# Patient Record
Sex: FEMALE | Race: WHITE | NOT HISPANIC OR LATINO | Employment: PART TIME | URBAN - METROPOLITAN AREA
[De-identification: names, ages, dates, MRNs, and addresses within clinical notes are randomized per-mention and may not be internally consistent; named-entity substitution may affect disease eponyms.]

---

## 2018-01-10 NOTE — RESULT NOTES
Verified Results  * XR SPINE LUMBAR MINIMUM 4 VIEWS NON INJURY [id number] 05:05PM Gia Navarro Order Number: [id number]    Order Number: [id number]     Test Name Result Flag Reference   XR SPINE LUMBAR MINIMUM 4 VIEWS (Report)     LUMBAR SPINE     INDICATION: Lower back pain  COMPARISON: None     VIEWS: AP, lateral, bilateral oblique and coned down projections; 5 images     FINDINGS:     Alignment is unremarkable  There is no radiographic evidence of acute fracture or destructive osseous lesion  Mild degenerative changes are noted the posterior elements of the mid and lower lumbar spine  Visualized soft tissues appear unremarkable  IMPRESSION:     Mild degenerative changes, mid and lower lumbar spine  Workstation performed: [id number]     Signed by:   Tracey Yan MD   9/9/16     * XR HIP/PELV 2-3 VWS LEFT W PELVIS IF PERFORMED [id number] 05:05PM Gia Navarro Order Number: [id number]    Order Number: [id number]    Order Number: [id number]     Test Name Result Flag Reference   * XR HIP/PELV 2-3 VWS LEFT (Report)     LEFT HIP     INDICATION: Left hip pain  COMPARISON: None     VIEWS: AP pelvis and 2 coned down views; 3 images     FINDINGS:     Posttraumatic or postsurgical changes are noted along the superolateral left iliac bone  Mild narrowing of the left hip joint space  No lytic or blastic lesions are seen  Tubal ligation appliances are seen in the true pelvis  IMPRESSION:     Mild degenerative changes, left hip  Workstation performed: [id number]     Signed by:   Tracey Yan MD   9/9/16

## 2018-01-15 NOTE — RESULT NOTES
Verified Results  (1) COMPREHENSIVE METABOLIC PANEL [id number] 06:68MA Patric Richardson     Test Name Result Flag Reference   GLUCOSE 106 mg/dL H 65-99   Fasting reference interval   UREA NITROGEN (BUN) 18 mg/dL  7-25   CREATININE 0 92 mg/dL  0 50-1 05   For patients >52years of age, the reference limit  for Creatinine is approximately 13% higher for people  identified as -American  eGFR NON-AFR   AMERICAN 70 mL/min/1 73m2  > OR = 60   eGFR AFRICAN AMERICAN 81 mL/min/1 73m2  > OR = 60   BUN/CREATININE RATIO   5-33   NOT APPLICABLE (calc)   SODIUM 137 mmol/L  135-146   POTASSIUM 4 5 mmol/L  3 5-5 3   CHLORIDE 105 mmol/L     CARBON DIOXIDE 25 mmol/L  20-31   CALCIUM 9 6 mg/dL  8 6-10 4   PROTEIN, TOTAL 6 8 g/dL  6 1-8 1   ALBUMIN 4 5 g/dL  3 6-5 1   GLOBULIN 2 3 g/dL (calc)  1 9-3 7   ALBUMIN/GLOBULIN RATIO 2 0 (calc)  1 0-2 5   BILIRUBIN, TOTAL 0 5 mg/dL  0 2-1 2   ALKALINE PHOSPHATASE 85 U/L     AST 14 U/L  10-35   ALT 13 U/L  6-29

## 2018-01-16 NOTE — RESULT NOTES
Verified Results  (1) ANTITHYROGLOBULIN ANTIBODY [id number] 11:06AM Araceli Morley Order Number: [id number]   Order Number: [id number]     Test Name Result Flag Reference   THYROGLOB AB <1 0 IU/mL  0 0 - 0 9   Thyroglobulin Antibody measured by [facility] Methodology    Performed at:  [address]  [id number]  : Erik Soto MD, Phone:  [phone] [address] [facility] THYROID [id number] 10:59AM Araceli Morley Order Number: [id number]    - Patient Instructions: To schedule this appointment, please contact Central Scheduling at [phone]   Order Number: [id number]    - Patient Instructions: To schedule this appointment, please contact Central Scheduling at [phone]   Order Number: [id number]    - Patient Instructions: To schedule this appointment, please contact Central Scheduling at [phone]  Test Name Result Flag Reference   US THYROID (Report)     THYROID ULTRASOUND     INDICATION: Palpable nodule     COMPARISON: None  TECHNIQUE:  Ultrasound of the thyroid was performed with a high frequency linear transducer in transverse and sagittal planes including volumetric imaging sweeps as well as traditional still imaging technique  FINDINGS:   Normal homogeneous smooth echotexture  Right gland: 5 0 x 1 5 x 1 8 cm  No dominant nodules  2 very small hypoechoic nodules, too small to characterize, 4 x 2 mm in the mid upper anterior gland 3 x 3 mm in the mid posterior gland, of doubtful clinical significance  Do not warrant biopsy at this time  No finding to account for clinical history  Left gland: 4 3 x 1 3 x 1 6 cm  No dominant nodules  Isthmus: 0 3 cm in AP dimension  No dominant nodules  IMPRESSION:      No finding to account for history  No nodules which meet criteria for biopsy         Workstation performed: [id number]     Signed by:   Tiana García MD   11/4/16

## 2018-01-31 ENCOUNTER — TELEPHONE (OUTPATIENT)
Dept: FAMILY MEDICINE CLINIC | Facility: CLINIC | Age: 58
End: 2018-01-31

## 2018-01-31 ENCOUNTER — OFFICE VISIT (OUTPATIENT)
Dept: FAMILY MEDICINE CLINIC | Facility: CLINIC | Age: 58
End: 2018-01-31
Payer: COMMERCIAL

## 2018-01-31 VITALS
SYSTOLIC BLOOD PRESSURE: 110 MMHG | DIASTOLIC BLOOD PRESSURE: 76 MMHG | HEIGHT: 68 IN | HEART RATE: 68 BPM | TEMPERATURE: 96.9 F | BODY MASS INDEX: 25.28 KG/M2 | WEIGHT: 166.8 LBS

## 2018-01-31 DIAGNOSIS — J01.01 ACUTE RECURRENT MAXILLARY SINUSITIS: Primary | ICD-10-CM

## 2018-01-31 PROCEDURE — 3008F BODY MASS INDEX DOCD: CPT | Performed by: NURSE PRACTITIONER

## 2018-01-31 PROCEDURE — 99213 OFFICE O/P EST LOW 20 MIN: CPT | Performed by: NURSE PRACTITIONER

## 2018-01-31 RX ORDER — DEXTROMETHORPHAN HYDROBROMIDE AND PROMETHAZINE HYDROCHLORIDE 15; 6.25 MG/5ML; MG/5ML
5 SYRUP ORAL EVERY 6 HOURS PRN
Qty: 118 ML | Refills: 0 | Status: SHIPPED | OUTPATIENT
Start: 2018-01-31 | End: 2019-05-13 | Stop reason: ALTCHOICE

## 2018-01-31 RX ORDER — CEPHALEXIN 500 MG/1
500 CAPSULE ORAL 3 TIMES DAILY
Qty: 30 CAPSULE | Refills: 0 | Status: SHIPPED | OUTPATIENT
Start: 2018-01-31 | End: 2018-02-10

## 2018-01-31 NOTE — PROGRESS NOTES
Patient ID: Domi De Los Santos is a 65 y o  female  HPI: 65 y  o female presenting with sinus for past 1 5 weeks   She reports feeling tired and achy with sinus pressure, andnasal congestion  Patient reports that her upper teeth hurt and a lot pressure across bridge of her nose  She as been using Afrin nasal spray to sleep at night and OTC Theraflu to treat decongest with only slight relief of symptoms  Patient denies fever or chills being associated with above mentioned symptoms  SUBJECTIVE    No family history on file  Social History     Social History    Marital status: /Civil Union     Spouse name: N/A    Number of children: N/A    Years of education: N/A     Occupational History    Not on file  Social History Main Topics    Smoking status: Not on file    Smokeless tobacco: Not on file    Alcohol use Not on file    Drug use: Unknown    Sexual activity: Not on file     Other Topics Concern    Not on file     Social History Narrative    No narrative on file     No past medical history on file  No past surgical history on file  Allergies no known allergies  No current outpatient prescriptions on file      Review of Systems  Constitutional:     Denies fever, chills ,weakness ,weight loss, weight gain   Positive fatigue  ENT: Denies earache ,loss of hearing ,nosebleed, sore throat ,hoarseness   Positive nasal discharge and nasal congestion ,  Pulmonary: Denies shortness of breath ,cough  ,dyspnea on exertion, orthopnea  ,PND   Cardiovascular:  Denies bradycardia , tachycardia  ,palpations, lower extremity edema leg, claudication  Abdomen:  Denies abdominal pain , anorexia , indigestion, nausea, vomiting, constipation, diarrhea  Musculoskeletal: Denies myalgias, arthralgias, joint swelling, joint stiffness , limb pain, limb swelling  Skin: Denies skin rash, skin lesion, skin wound, itching, dry skin  Neuro: Denies headache, numbness, tingling, confusion, loss of consciousness, dizziness, vertigo  Psychiatric: Denies feelings of depression, anxiety, sleep disturbances    OBJECTIVE    Constitutional:   NAD, well appearing and well nourished      ENT:   Conjunctiva and lids: no injection, edema, or discharge     Pupils and iris: BABAR bilaterally    External inspection of ears and nose: normal without deformities or discharge  Otoscopic exam: Right canal patent without erythema  Left canal erythematous with TM dull  Nasal mucosa, septum and turbinates: Positive bilateral turbinate erythema with thick nasal mucous  Oropharynx:  Moist mucosa, Positive posterior pharynx erythema with visible post nasal drip  Pulmonary:Respiratory effort normal rate and rhythm, no increased work of breathing   Auscultation of lungs:  Clear bilaterally with no adventitious breath sounds       Cardiovascular: regular rate and rhythm, S1 and S2, no murmur, no edema and/or varicosities of LE      Abdomen: Soft and non-distended     Lymphatic:  No anterior or posterior cervical lymphadenopathy    Musculoskeletal:  Gait and station: Normal gait      Digits and nails normal without clubbing or cyanosis       Skin: Normal skin turgor and no rashes      Neuro:    Normal reflexes    Psych:   alert and oriented to person, place and time     normal mood and affect       Assessment/Plan:  Maxillary sinusitis, recurrent  Reviewed with patient plan to treat with 10 day course of Cephalexin and Promethazine DM every 6 hours as needed  Patient instructed to call in 72 hours if not feeling better or if symptoms worsen

## 2018-07-17 ENCOUNTER — TELEPHONE (OUTPATIENT)
Dept: BEHAVIORAL/MENTAL HEALTH CLINIC | Facility: CLINIC | Age: 58
End: 2018-07-17

## 2018-07-17 NOTE — TELEPHONE ENCOUNTER
----- Message from Mi Naida sent at 7/17/2018 12:30 PM EDT -----  Contact: [phone]  Has shared custody of 15year old grandson who lost his father  Tiffany Padilla has anger and behavioral issues  Grandson's mother problematic  Luciana looking for direction on dealing with grandson and may need to bring her  in as well   Yomaira Centeno

## 2019-02-10 ENCOUNTER — HOSPITAL ENCOUNTER (EMERGENCY)
Facility: HOSPITAL | Age: 59
Discharge: HOME | End: 2019-02-10
Attending: EMERGENCY MEDICINE | Admitting: EMERGENCY MEDICINE
Payer: COMMERCIAL

## 2019-02-10 VITALS
OXYGEN SATURATION: 97 % | HEART RATE: 76 BPM | TEMPERATURE: 97.9 F | WEIGHT: 158.73 LBS | BODY MASS INDEX: 24.31 KG/M2 | RESPIRATION RATE: 18 BRPM | DIASTOLIC BLOOD PRESSURE: 62 MMHG | SYSTOLIC BLOOD PRESSURE: 108 MMHG

## 2019-02-10 DIAGNOSIS — J40 BRONCHITIS: ICD-10-CM

## 2019-02-10 DIAGNOSIS — J45.909 BRONCHIAL ASTHMA: Primary | ICD-10-CM

## 2019-02-10 PROCEDURE — 99283 EMERGENCY DEPT VISIT LOW MDM: CPT

## 2019-02-10 PROCEDURE — 94640 AIRWAY INHALATION TREATMENT: CPT

## 2019-02-10 RX ORDER — ALBUTEROL SULFATE 2.5 MG/3ML
5 SOLUTION RESPIRATORY (INHALATION) ONCE
Status: COMPLETED | OUTPATIENT
Start: 2019-02-10 | End: 2019-02-10

## 2019-02-10 RX ORDER — ALBUTEROL SULFATE 90 UG/1
1-2 AEROSOL, METERED RESPIRATORY (INHALATION) EVERY 6 HOURS PRN
Qty: 1 INHALER | Refills: 0 | Status: SHIPPED | OUTPATIENT
Start: 2019-02-10 | End: 2019-05-13 | Stop reason: ALTCHOICE

## 2019-02-10 RX ADMIN — ALBUTEROL SULFATE 5 MG: 2.5 SOLUTION RESPIRATORY (INHALATION) at 12:03

## 2019-02-10 NOTE — ED NOTES
Pt appeared to be in no acute distress upon discharge  Verbal understanding obtained from pt on d/c instructions as well as Rx and follow up care  Pt able to ambulate well without assistance upon exiting       Corinne Obey, RN  02/10/19 4103

## 2019-02-10 NOTE — ED PROVIDER NOTES
History  Chief Complaint   Patient presents with    Asthma     pt informed went to urgent care yesterday dx with bronchitis and asthma  given PO abx and inhaler with cough medication and steroid all started yesterday at 1pm      Patient presents to the emergency department for evaluation of breathing difficulty  She was seen at a local urgent care and diagnosed with asthmatic bronchitis yesterday and placed on antibiotics and steroids  This morning she was having breathing difficulty that has improved since  She denies chest pain fever chills  She denies back or belly pain  She denies jaw arm or neck pain  She denies nausea vomiting diarrhea  She does not have a history of asthma  She has been eating and drinking normally  Prior to Admission Medications   Prescriptions Last Dose Informant Patient Reported? Taking?   promethazine-dextromethorphan (PHENERGAN-DM) 6 25-15 mg/5 mL oral syrup   No Yes   Sig: Take 5 mL by mouth every 6 (six) hours as needed for cough      Facility-Administered Medications: None       Past Medical History:   Diagnosis Date    Congenital hip dysplasia     Seasonal allergies        Past Surgical History:   Procedure Laterality Date    HIP SURGERY      OVARIAN CYST REMOVAL      TUBAL LIGATION         Family History   Problem Relation Age of Onset    Cancer Father         Urinary Bladder     I have reviewed and agree with the history as documented  Social History     Tobacco Use    Smoking status: Former Smoker    Smokeless tobacco: Never Used   Substance Use Topics    Alcohol use: Yes     Comment: rare    Drug use: Never        Review of Systems   Constitutional: Negative  Negative for activity change, appetite change, chills, diaphoresis, fatigue and fever  HENT: Negative  Negative for congestion, drooling, ear discharge, ear pain, rhinorrhea, sinus pressure, sinus pain, sore throat and trouble swallowing  Eyes: Negative    Negative for photophobia and visual disturbance  Respiratory: Positive for chest tightness, shortness of breath and wheezing  Negative for cough, choking and stridor  Cardiovascular: Negative  Negative for chest pain, palpitations and leg swelling  Gastrointestinal: Negative for abdominal pain, diarrhea, nausea and vomiting  Endocrine: Negative  Genitourinary: Negative  Negative for dysuria, frequency, hematuria and urgency  Musculoskeletal: Negative  Negative for back pain, neck pain and neck stiffness  Skin: Negative  Negative for rash  Allergic/Immunologic: Negative  Neurological: Negative  Negative for dizziness, tremors, seizures, syncope, speech difficulty, weakness, numbness and headaches  Hematological: Negative  Does not bruise/bleed easily  Psychiatric/Behavioral: Negative  Negative for confusion  Physical Exam  Physical Exam   Constitutional: She is oriented to person, place, and time  She appears well-developed and well-nourished  No distress  Nontoxic appearance  No obvious respiratory distress  Patient can speak in full sentences  HENT:   Head: Normocephalic and atraumatic  Eyes: Pupils are equal, round, and reactive to light  Conjunctivae and EOM are normal    Neck: Normal range of motion  Neck supple  Cardiovascular: Normal rate, regular rhythm, normal heart sounds and intact distal pulses  Pulmonary/Chest: Effort normal and breath sounds normal  No stridor  No respiratory distress  She has no wheezes  She has no rales  She exhibits no tenderness  Abdominal: Soft  Bowel sounds are normal  She exhibits no distension and no mass  There is no tenderness  There is no rebound and no guarding  No hernia  Musculoskeletal: Normal range of motion  She exhibits no edema, tenderness or deformity  Neurological: She is alert and oriented to person, place, and time  She has normal reflexes  She displays normal reflexes  No cranial nerve deficit or sensory deficit   She exhibits normal muscle tone  Coordination normal    Skin: Skin is warm and dry  No rash noted  She is not diaphoretic  No erythema  No pallor  Psychiatric: She has a normal mood and affect  Her behavior is normal  Judgment and thought content normal    Nursing note and vitals reviewed  Vital Signs  ED Triage Vitals [02/10/19 1119]   Temperature Pulse Respirations Blood Pressure SpO2   97 9 °F (36 6 °C) 76 18 108/62 97 %      Temp Source Heart Rate Source Patient Position - Orthostatic VS BP Location FiO2 (%)   Oral Monitor Sitting Right arm --      Pain Score       No Pain           Vitals:    02/10/19 1119   BP: 108/62   Pulse: 76   Patient Position - Orthostatic VS: Sitting       Visual Acuity      ED Medications  Medications   albuterol inhalation solution 5 mg (5 mg Nebulization Given 2/10/19 1203)       Diagnostic Studies  Results Reviewed     None                 No orders to display              Procedures  Procedures       Phone Contacts  ED Phone Contact    ED Course  ED Course as of Feb 10 1228   Sun Feb 10, 2019   1225 Patient is stable for discharge  Feels significantly improved after an albuterol treatment  She will continue all of her medications and I will discharge her with under inhaler  I discussed signs and symptoms that would require return to the emergency department  MDM    Disposition  Final diagnoses:   Bronchial asthma   Bronchitis     Time reflects when diagnosis was documented in both MDM as applicable and the Disposition within this note     Time User Action Codes Description Comment    2/10/2019 12:26 PM Sary Silence Add [V69 531] Bronchial asthma     2/10/2019 12:26 PM Sary Silence Add [J40] Bronchitis       ED Disposition     ED Disposition Condition Date/Time Comment    Discharge Good Sun Feb 10, 2019 12:27 PM Aaron Marcelino discharge to home/self care              Follow-up Information     Follow up With Specialties Details Why Contact Info Ina Goldstein, DO Family Medicine  As needed [address]  [address]  [phone]            Patient's Medications   Discharge Prescriptions    ALBUTEROL (PROVENTIL HFA,VENTOLIN HFA) 90 MCG/ACT INHALER    Inhale 1-2 puffs every 6 (six) hours as needed for wheezing       Start Date: 2/10/2019 End Date: --       Order Dose: 1-2 puffs       Quantity: 1 Inhaler    Refills: 0     No discharge procedures on file      ED Provider  Electronically Signed by           Adam Goodwin MD  02/10/19 9582

## 2019-03-21 ENCOUNTER — TELEPHONE (OUTPATIENT)
Dept: FAMILY MEDICINE CLINIC | Facility: CLINIC | Age: 59
End: 2019-03-21

## 2019-05-13 ENCOUNTER — TELEPHONE (OUTPATIENT)
Dept: FAMILY MEDICINE CLINIC | Facility: CLINIC | Age: 59
End: 2019-05-13

## 2019-05-13 ENCOUNTER — OFFICE VISIT (OUTPATIENT)
Dept: FAMILY MEDICINE CLINIC | Facility: CLINIC | Age: 59
End: 2019-05-13
Payer: COMMERCIAL

## 2019-05-13 VITALS
DIASTOLIC BLOOD PRESSURE: 62 MMHG | TEMPERATURE: 98.7 F | WEIGHT: 161.8 LBS | SYSTOLIC BLOOD PRESSURE: 108 MMHG | HEART RATE: 76 BPM | BODY MASS INDEX: 25.39 KG/M2 | HEIGHT: 67 IN

## 2019-05-13 DIAGNOSIS — M54.50 LUMBAR BACK PAIN: Primary | ICD-10-CM

## 2019-05-13 PROCEDURE — 3008F BODY MASS INDEX DOCD: CPT | Performed by: FAMILY MEDICINE

## 2019-05-13 PROCEDURE — 1036F TOBACCO NON-USER: CPT | Performed by: FAMILY MEDICINE

## 2019-05-13 PROCEDURE — 99213 OFFICE O/P EST LOW 20 MIN: CPT | Performed by: FAMILY MEDICINE

## 2019-05-13 RX ORDER — HYDROCODONE BITARTRATE AND ACETAMINOPHEN 5; 325 MG/1; MG/1
1 TABLET ORAL 3 TIMES DAILY PRN
Qty: 30 TABLET | Refills: 0 | Status: SHIPPED | OUTPATIENT
Start: 2019-05-13 | End: 2019-05-23

## 2019-05-13 RX ORDER — METHOCARBAMOL 500 MG/1
500 TABLET, FILM COATED ORAL 3 TIMES DAILY
Qty: 30 TABLET | Refills: 0 | Status: SHIPPED | OUTPATIENT
Start: 2019-05-13 | End: 2022-02-21 | Stop reason: ALTCHOICE

## 2019-05-13 RX ORDER — PREDNISONE 10 MG/1
TABLET ORAL
Qty: 26 TABLET | Refills: 0 | Status: SHIPPED | OUTPATIENT
Start: 2019-05-13 | End: 2022-02-21 | Stop reason: ALTCHOICE

## 2019-08-02 ENCOUNTER — TELEPHONE (OUTPATIENT)
Dept: FAMILY MEDICINE CLINIC | Facility: CLINIC | Age: 59
End: 2019-08-02

## 2019-08-02 DIAGNOSIS — Z12.39 SCREENING FOR BREAST CANCER: Primary | ICD-10-CM

## 2019-08-02 DIAGNOSIS — E78.00 HYPERCHOLESTEROLEMIA: ICD-10-CM

## 2019-08-02 DIAGNOSIS — Z12.11 COLON CANCER SCREENING: Primary | ICD-10-CM

## 2019-08-02 DIAGNOSIS — R53.83 OTHER FATIGUE: ICD-10-CM

## 2019-08-02 NOTE — TELEPHONE ENCOUNTER
Patient called asking if a routine blood work slip could be written? Also she is asking if a cologuard slip could be written?

## 2020-11-24 ENCOUNTER — TELEPHONE (OUTPATIENT)
Dept: FAMILY MEDICINE CLINIC | Facility: CLINIC | Age: 60
End: 2020-11-24

## 2020-11-24 ENCOUNTER — CLINICAL SUPPORT (OUTPATIENT)
Dept: FAMILY MEDICINE CLINIC | Facility: CLINIC | Age: 60
End: 2020-11-24

## 2020-11-24 DIAGNOSIS — Z20.822 SUSPECTED COVID-19 VIRUS INFECTION: Primary | ICD-10-CM

## 2020-11-24 PROCEDURE — RECHECK: Performed by: FAMILY MEDICINE

## 2020-11-24 PROCEDURE — U0003 INFECTIOUS AGENT DETECTION BY NUCLEIC ACID (DNA OR RNA); SEVERE ACUTE RESPIRATORY SYNDROME CORONAVIRUS 2 (SARS-COV-2) (CORONAVIRUS DISEASE [COVID-19]), AMPLIFIED PROBE TECHNIQUE, MAKING USE OF HIGH THROUGHPUT TECHNOLOGIES AS DESCRIBED BY CMS-2020-01-R: HCPCS | Performed by: FAMILY MEDICINE

## 2020-11-25 LAB — SARS-COV-2 RNA SPEC QL NAA+PROBE: NOT DETECTED

## 2020-12-08 ENCOUNTER — TELEMEDICINE (OUTPATIENT)
Dept: FAMILY MEDICINE CLINIC | Facility: CLINIC | Age: 60
End: 2020-12-08
Payer: COMMERCIAL

## 2020-12-08 ENCOUNTER — TELEPHONE (OUTPATIENT)
Dept: FAMILY MEDICINE CLINIC | Facility: CLINIC | Age: 60
End: 2020-12-08

## 2020-12-08 DIAGNOSIS — J01.00 ACUTE NON-RECURRENT MAXILLARY SINUSITIS: Primary | ICD-10-CM

## 2020-12-08 PROCEDURE — 99213 OFFICE O/P EST LOW 20 MIN: CPT | Performed by: FAMILY MEDICINE

## 2020-12-08 RX ORDER — AMOXICILLIN AND CLAVULANATE POTASSIUM 875; 125 MG/1; MG/1
1 TABLET, FILM COATED ORAL EVERY 12 HOURS SCHEDULED
Qty: 14 TABLET | Refills: 0 | Status: SHIPPED | OUTPATIENT
Start: 2020-12-08 | End: 2020-12-15

## 2021-01-26 ENCOUNTER — VBI (OUTPATIENT)
Dept: ADMINISTRATIVE | Facility: OTHER | Age: 61
End: 2021-01-26

## 2021-02-09 ENCOUNTER — VBI (OUTPATIENT)
Dept: ADMINISTRATIVE | Facility: OTHER | Age: 61
End: 2021-02-09

## 2021-03-18 ENCOUNTER — APPOINTMENT (OUTPATIENT)
Dept: LAB | Facility: CLINIC | Age: 61
End: 2021-03-18
Payer: COMMERCIAL

## 2021-03-18 ENCOUNTER — OFFICE VISIT (OUTPATIENT)
Dept: FAMILY MEDICINE CLINIC | Facility: CLINIC | Age: 61
End: 2021-03-18
Payer: COMMERCIAL

## 2021-03-18 ENCOUNTER — TRANSCRIBE ORDERS (OUTPATIENT)
Dept: LAB | Facility: CLINIC | Age: 61
End: 2021-03-18

## 2021-03-18 VITALS
OXYGEN SATURATION: 98 % | DIASTOLIC BLOOD PRESSURE: 82 MMHG | BODY MASS INDEX: 26.83 KG/M2 | HEART RATE: 77 BPM | HEIGHT: 68 IN | SYSTOLIC BLOOD PRESSURE: 122 MMHG | WEIGHT: 177 LBS | TEMPERATURE: 98.1 F

## 2021-03-18 DIAGNOSIS — R53.83 OTHER FATIGUE: ICD-10-CM

## 2021-03-18 DIAGNOSIS — Z00.00 ANNUAL PHYSICAL EXAM: Primary | ICD-10-CM

## 2021-03-18 DIAGNOSIS — J01.90 ACUTE SINUSITIS, RECURRENCE NOT SPECIFIED, UNSPECIFIED LOCATION: ICD-10-CM

## 2021-03-18 DIAGNOSIS — E78.00 HYPERCHOLESTEROLEMIA: ICD-10-CM

## 2021-03-18 DIAGNOSIS — J30.9 ALLERGIC RHINITIS, UNSPECIFIED SEASONALITY, UNSPECIFIED TRIGGER: ICD-10-CM

## 2021-03-18 DIAGNOSIS — E04.9 GOITER: ICD-10-CM

## 2021-03-18 DIAGNOSIS — Z12.31 ENCOUNTER FOR SCREENING MAMMOGRAM FOR MALIGNANT NEOPLASM OF BREAST: ICD-10-CM

## 2021-03-18 LAB
ALBUMIN SERPL BCP-MCNC: 4 G/DL (ref 3.5–5)
ALP SERPL-CCNC: 111 U/L (ref 46–116)
ALT SERPL W P-5'-P-CCNC: 30 U/L (ref 12–78)
ANION GAP SERPL CALCULATED.3IONS-SCNC: 2 MMOL/L (ref 4–13)
AST SERPL W P-5'-P-CCNC: 19 U/L (ref 5–45)
BILIRUB SERPL-MCNC: 0.9 MG/DL (ref 0.2–1)
BUN SERPL-MCNC: 17 MG/DL (ref 5–25)
CALCIUM SERPL-MCNC: 9.6 MG/DL (ref 8.3–10.1)
CHLORIDE SERPL-SCNC: 106 MMOL/L (ref 100–108)
CHOLEST SERPL-MCNC: 288 MG/DL (ref 50–200)
CO2 SERPL-SCNC: 29 MMOL/L (ref 21–32)
CREAT SERPL-MCNC: 1 MG/DL (ref 0.6–1.3)
GFR SERPL CREATININE-BSD FRML MDRD: 62 ML/MIN/1.73SQ M
GLUCOSE P FAST SERPL-MCNC: 97 MG/DL (ref 65–99)
HDLC SERPL-MCNC: 44 MG/DL
LDLC SERPL CALC-MCNC: 190 MG/DL (ref 0–100)
POTASSIUM SERPL-SCNC: 4.3 MMOL/L (ref 3.5–5.3)
PROT SERPL-MCNC: 7.4 G/DL (ref 6.4–8.2)
SODIUM SERPL-SCNC: 137 MMOL/L (ref 136–145)
TRIGL SERPL-MCNC: 272 MG/DL
TSH SERPL DL<=0.05 MIU/L-ACNC: 1.76 UIU/ML (ref 0.36–3.74)

## 2021-03-18 PROCEDURE — 1036F TOBACCO NON-USER: CPT | Performed by: FAMILY MEDICINE

## 2021-03-18 PROCEDURE — 3725F SCREEN DEPRESSION PERFORMED: CPT | Performed by: FAMILY MEDICINE

## 2021-03-18 PROCEDURE — 80053 COMPREHEN METABOLIC PANEL: CPT

## 2021-03-18 PROCEDURE — 80061 LIPID PANEL: CPT

## 2021-03-18 PROCEDURE — 99214 OFFICE O/P EST MOD 30 MIN: CPT | Performed by: FAMILY MEDICINE

## 2021-03-18 PROCEDURE — 36415 COLL VENOUS BLD VENIPUNCTURE: CPT

## 2021-03-18 PROCEDURE — 84443 ASSAY THYROID STIM HORMONE: CPT

## 2021-03-18 PROCEDURE — 99396 PREV VISIT EST AGE 40-64: CPT | Performed by: FAMILY MEDICINE

## 2021-03-18 RX ORDER — MONTELUKAST SODIUM 10 MG/1
10 TABLET ORAL
Qty: 30 TABLET | Refills: 5 | Status: SHIPPED | OUTPATIENT
Start: 2021-03-18 | End: 2021-03-25 | Stop reason: SDUPTHER

## 2021-03-18 RX ORDER — AZELASTINE 1 MG/ML
2 SPRAY, METERED NASAL 2 TIMES DAILY
Qty: 1 BOTTLE | Refills: 1 | Status: SHIPPED | OUTPATIENT
Start: 2021-03-18 | End: 2021-04-09

## 2021-03-18 RX ORDER — AZITHROMYCIN 250 MG/1
TABLET, FILM COATED ORAL
Qty: 6 TABLET | Refills: 0 | Status: SHIPPED | OUTPATIENT
Start: 2021-03-18 | End: 2021-03-22

## 2021-03-18 NOTE — PROGRESS NOTES
ADULT ANNUAL [address]    NAME: Huong Hidalgo  AGE: 62 y o  SEX: female  : 1960     DATE: 3/18/2021     Assessment and Plan:     Problem List Items Addressed This Visit     None      Visit Diagnoses     Encounter for screening mammogram for malignant neoplasm of breast    -  Primary    Relevant Orders    Mammo screening bilateral w 3d & cad          Immunizations and preventive care screenings were discussed with patient today  Appropriate education was printed on patient's after visit summary  Counseling:  · Exercise: the importance of regular exercise/physical activity was discussed  Recommend exercise 3-5 times per week for at least 30 minutes  No follow-ups on file  Chief Complaint:     Chief Complaint   Patient presents with    Annual Exam      History of Present Illness:     Adult Annual Physical   Patient here for a comprehensive physical exam  The patient reports no problems  Diet and Physical Activity  · Diet/Nutrition: well balanced diet  · Exercise: no formal exercise  Depression Screening  PHQ-9 Depression Screening    PHQ-9:   Frequency of the following problems over the past two weeks:      Little interest or pleasure in doing things: 0 - not at all  Feeling down, depressed, or hopeless: 0 - not at all  PHQ-2 Score: 0       General Health  · Sleep: sleeps well  · Hearing: normal - bilateral   · Vision: no vision problems  · Dental: regular dental visits  /GYN Health  · Patient is: postmenopausal  · Last menstrual period:   · Contraceptive method:       Review of Systems:     Review of Systems   Constitutional: Negative for appetite change, chills and fever  HENT: Negative for ear pain, facial swelling, rhinorrhea, sinus pain, sore throat and trouble swallowing  Eyes: Negative for discharge and redness     Respiratory: Negative for chest tightness, shortness of breath and wheezing  Cardiovascular: Negative for chest pain and palpitations  Gastrointestinal: Negative for abdominal pain, diarrhea, nausea and vomiting  Endocrine: Negative for polyuria  Genitourinary: Negative for dysuria and urgency  Musculoskeletal: Negative for arthralgias and back pain  Skin: Negative for rash  Neurological: Negative for dizziness, weakness and headaches  Hematological: Negative for adenopathy  Psychiatric/Behavioral: Negative for behavioral problems, confusion and sleep disturbance  All other systems reviewed and are negative       Past Medical History:     Past Medical History:   Diagnosis Date    Congenital hip dysplasia     Seasonal allergies       Past Surgical History:     Past Surgical History:   Procedure Laterality Date    HIP SURGERY      OVARIAN CYST REMOVAL      TUBAL LIGATION        Social History:        Social History     Socioeconomic History    Marital status: /Civil Union     Spouse name: None    Number of children: None    Years of education: None    Highest education level: None   Occupational History    None   Social Needs    Financial resource strain: None    Food insecurity     Worry: None     Inability: None    Transportation needs     Medical: None     Non-medical: None   Tobacco Use    Smoking status: Former Smoker    Smokeless tobacco: Never Used   Substance and Sexual Activity    Alcohol use: Yes     Comment: rare    Drug use: Never    Sexual activity: None   Lifestyle    Physical activity     Days per week: None     Minutes per session: None    Stress: None   Relationships    Social connections     Talks on phone: None     Gets together: None     Attends Zoroastrian service: None     Active member of club or organization: None     Attends meetings of clubs or organizations: None     Relationship status: None    Intimate partner violence     Fear of current or ex partner: None     Emotionally abused: None     Physically abused: None     Forced sexual activity: None   Other Topics Concern    None   Social History Narrative    Coffee      Family History:     Family History   Problem Relation Age of Onset    Cancer Father         Urinary Bladder      Current Medications:     Current Outpatient Medications   Medication Sig Dispense Refill    methocarbamol (ROBAXIN) 500 mg tablet Take 1 tablet (500 mg total) by mouth 3 (three) times a day 30 tablet 0    predniSONE 10 mg tablet 3 tabs po bid x2 days, then 2 tabs po bid x2 days, then 1 tab bid x2 days, then 1 daily until done  26 tablet 0     No current facility-administered medications for this visit  Allergies:     No Known Allergies   Physical Exam:     /82   Pulse 77   Temp 98 1 °F (36 7 °C)   Ht 5' 7 5" (1 715 m)   Wt 80 3 kg (177 lb)   SpO2 98%   BMI 27 31 kg/m²     Physical Exam  Vitals signs and nursing note reviewed  Constitutional:       General: She is not in acute distress  Appearance: Normal appearance  She is not ill-appearing or diaphoretic  HENT:      Head: Normocephalic and atraumatic  Right Ear: Tympanic membrane, ear canal and external ear normal       Left Ear: Tympanic membrane, ear canal and external ear normal       Nose: Nose normal  No congestion or rhinorrhea  Mouth/Throat:      Mouth: Mucous membranes are moist       Pharynx: Oropharynx is clear  No posterior oropharyngeal erythema  Eyes:      General:         Right eye: No discharge  Left eye: No discharge  Extraocular Movements: Extraocular movements intact  Conjunctiva/sclera: Conjunctivae normal       Pupils: Pupils are equal, round, and reactive to light  Neck:      Musculoskeletal: Normal range of motion and neck supple  No neck rigidity  Vascular: No carotid bruit  Cardiovascular:      Rate and Rhythm: Normal rate and regular rhythm  Pulses: Normal pulses  Heart sounds: Normal heart sounds  No murmur     Pulmonary:      Effort: Pulmonary effort is normal  No respiratory distress  Breath sounds: Normal breath sounds  No wheezing or rhonchi  Abdominal:      General: Abdomen is flat  Bowel sounds are normal  There is no distension  Palpations: There is no mass  Tenderness: There is no abdominal tenderness  Musculoskeletal: Normal range of motion  General: No swelling or deformity  Right lower leg: No edema  Left lower leg: No edema  Lymphadenopathy:      Cervical: No cervical adenopathy  Skin:     General: Skin is warm and dry  Capillary Refill: Capillary refill takes less than 2 seconds  Coloration: Skin is not jaundiced  Findings: No bruising, erythema or rash  Neurological:      General: No focal deficit present  Mental Status: She is alert and oriented to person, place, and time  Cranial Nerves: No cranial nerve deficit  Sensory: No sensory deficit  Gait: Gait normal       Deep Tendon Reflexes: Reflexes normal    Psychiatric:         Mood and Affect: Mood normal          Behavior: Behavior normal          Thought Content: Thought content normal          Judgment: Judgment normal           BMI Counseling: Body mass index is 27 31 kg/m²  The BMI is above normal  Nutrition recommendations include reducing portion sizes       Allie Kiran, [address]

## 2021-03-18 NOTE — PATIENT INSTRUCTIONS

## 2021-03-19 DIAGNOSIS — E78.00 HYPERCHOLESTEROLEMIA: Primary | ICD-10-CM

## 2021-03-25 RX ORDER — MONTELUKAST SODIUM 10 MG/1
10 TABLET ORAL
Qty: 30 TABLET | Refills: 5
Start: 2021-03-25 | End: 2022-02-21 | Stop reason: ALTCHOICE

## 2021-03-25 NOTE — PROGRESS NOTES
Patient ID: Gary Gonzalez is a 62 y o  female  HPI: 62 y  o female presenting with symptoms of sinus pain,pressure, nasal congestion, pnd dry cough , ear and throat pain  She is also is looking for a med to help with her allergies  She takes an otc claritin daily but still has daily symtpoms of pnd, crackling in her ears, fatigue  and pnd  In addition,she is due for a cholesterol as well as thryoid level check      SUBJECTIVE    Family History   Problem Relation Age of Onset    Cancer Father         Urinary Bladder     Social History     Socioeconomic History    Marital status: /Civil Union     Spouse name: Not on file    Number of children: Not on file    Years of education: Not on file    Highest education level: Not on file   Occupational History    Not on file   Social Needs    Financial resource strain: Not on file    Food insecurity     Worry: Not on file     Inability: Not on file   [employer] needs     Medical: Not on file     Non-medical: Not on file   Tobacco Use    Smoking status: Former Smoker    Smokeless tobacco: Never Used   Substance and Sexual Activity    Alcohol use: Yes     Comment: rare    Drug use: Never    Sexual activity: Not on file   Lifestyle    Physical activity     Days per week: Not on file     Minutes per session: Not on file    Stress: Not on file   Relationships    Social connections     Talks on phone: Not on file     Gets together: Not on file     Attends Denominational service: Not on file     Active member of club or organization: Not on file     Attends meetings of clubs or organizations: Not on file     Relationship status: Not on file    Intimate partner violence     Fear of current or ex partner: Not on file     Emotionally abused: Not on file     Physically abused: Not on file     Forced sexual activity: Not on file   Other Topics Concern    Not on file   Social History Narrative    Coffee     Past Medical History:   Diagnosis Date   Fabian Bones Congenital hip dysplasia     Seasonal allergies      Past Surgical History:   Procedure Laterality Date    HIP SURGERY      OVARIAN CYST REMOVAL      TUBAL LIGATION       No Known Allergies    Current Outpatient Medications:     azelastine (ASTELIN) 0 1 % nasal spray, 2 sprays into each nostril 2 (two) times a day Use in each nostril as directed, Disp: 1 Bottle, Rfl: 1    methocarbamol (ROBAXIN) 500 mg tablet, Take 1 tablet (500 mg total) by mouth 3 (three) times a day, Disp: 30 tablet, Rfl: 0    montelukast (SINGULAIR) 10 mg tablet, Take 1 tablet (10 mg total) by mouth daily at bedtime, Disp: 30 tablet, Rfl: 5    predniSONE 10 mg tablet, 3 tabs po bid x2 days, then 2 tabs po bid x2 days, then 1 tab bid x2 days, then 1 daily until done , Disp: 26 tablet, Rfl: 0    Review of Systems  Constitutional:     Denies fever, chills, weakness ,weight loss, weight gain    +_fatigue  ENT: Denies earache, loss of hearing, nosebleed, nasal discharge,but complains of nasal congestion, sore throat,hoarseness and sinus pain and pressure+ chronic crackling of ears    Pulmonary: Denies shortness of breath ,cough , dyspnea on exertionon, orthopnea ,+ PND   Cardiovascular:  Denies bradycardia , tachycardia ,palpations, lower extremity, edema leg, claudication  Breast:  Denies new or changing breast lumps,  nipple discharge, nipple changes,  Abdomen:  Denies abdominal pain , anorexia ,indigestion, nausea ,vomiting, constipation , diarrhea  Musculoskeletal: Denies myalgias, arthralgias, joint swelling, joint stiffness ,limb pain, limb swelling  Lymph:+ swollen glands  Gu: no dysuria or urinary frequency  Skin: Denies skin rash, skin lesion, skin wound, itching,dry skin  Neuro: Denies headache, numbness, tingling, confusion, loss of consciousness, dizziness ,vertigo  Psychiatric: Denies feelings of depression, suicidal ideation, anxiety, sleep disturbances    OBJECTIVE  /82   Pulse 77   Temp 98 1 °F (36 7 °C)   Ht 5' 7 5" (1 715 m)   Wt 80 3 kg (177 lb)   SpO2 98%   BMI 27 31 kg/m²   Constitutional:   NAD, well appearing and well nourished      ENT:   Conjunctiva and lids: no injection, edema, or discharge    Pupils and iris: BABAR bilaterally   External inspection of ears and nose: normal without deformities or discharge  Otoscopic exam: Canals patent ; tm are dull, with with erythem and effusions  ENasal mucosa, septum and turbinates: Turbinae injection with discharge   Oropharynx:  Moist mucosa, normal tongue and tonsils without lesions  Erythema and injection  of post pharynx with pnd      Pulmonary:Respiratory effort normal rate and rhythm, no increased work of breathing  Auscultation of lungs:  Clear bilaterally with no adventitious breath sounds       Cardiovascular: regular rate and rhythm, S1 and S2, no murmur, no edema and/or varicosities of LE      Abdomen: Soft and non-distended    Positive bowel sounds    No heptomegaly or splenomegaly    Lymphatic: Anterior  cervical lymphadenopathy         Muscskeletal:  Gait and station: Normal gait     Digits and nails normal without clubbing or cyanosis     Inspection/palpation of joints, bones, and muscles:  No joint tenderness, swelling, full active and passive range of motion      Gu: no suprabubic tenderness, CVA tenderness or urethral discharge  Skin: Normal skin turgor and no rashes    Neuro:    Normal reflexes   Psych:   alert and oriented to person, place and time  normal mood and affec      Assessment/Plan:Diagnoses and all orders for this visit:    Annual physical exam  -     Discontinue: montelukast (SINGULAIR) 10 mg tablet; Take 1 tablet (10 mg total) by mouth daily at bedtime    Acute sinusitis, recurrence not specified, unspecified location  -     azithromycin (ZITHROMAX) 250 mg tablet;  Take 2 tablets today then 1 tablet daily x 4 days    Allergic rhinitis, unspecified seasonality, unspecified trigger  -     azelastine (ASTELIN) 0 1 % nasal spray; 2 sprays into each nostril 2 (two) times a day Use in each nostril as directed  -     montelukast (SINGULAIR) 10 mg tablet; Take 1 tablet (10 mg total) by mouth daily at bedtime    Hypercholesterolemia  -     Lipid Panel with Direct LDL reflex; Future    Goiter  -     TSH, 3rd generation; Future    Other fatigue  -     Comprehensive metabolic panel; Future    Encounter for screening mammogram for malignant neoplasm of breast  -     Mammo screening bilateral w 3d & cad; Future        Reviewed with patient plan to treat with above plan      Patient instructed to call in 72 hours if not feeling better or if symptoms worsen

## 2021-04-09 DIAGNOSIS — Z23 ENCOUNTER FOR IMMUNIZATION: ICD-10-CM

## 2021-04-09 DIAGNOSIS — J30.9 ALLERGIC RHINITIS, UNSPECIFIED SEASONALITY, UNSPECIFIED TRIGGER: ICD-10-CM

## 2021-04-09 RX ORDER — AZELASTINE 1 MG/ML
SPRAY, METERED NASAL
Qty: 1 BOTTLE | Refills: 1 | Status: SHIPPED | OUTPATIENT
Start: 2021-04-09 | End: 2021-05-06

## 2021-05-06 DIAGNOSIS — J30.9 ALLERGIC RHINITIS, UNSPECIFIED SEASONALITY, UNSPECIFIED TRIGGER: ICD-10-CM

## 2021-05-06 RX ORDER — AZELASTINE 1 MG/ML
SPRAY, METERED NASAL
Qty: 1 BOTTLE | Refills: 1 | Status: SHIPPED | OUTPATIENT
Start: 2021-05-06 | End: 2021-05-20

## 2021-05-20 DIAGNOSIS — J30.9 ALLERGIC RHINITIS, UNSPECIFIED SEASONALITY, UNSPECIFIED TRIGGER: ICD-10-CM

## 2021-05-20 RX ORDER — AZELASTINE 1 MG/ML
SPRAY, METERED NASAL
Qty: 90 ML | Refills: 1 | Status: SHIPPED | OUTPATIENT
Start: 2021-05-20 | End: 2022-02-21 | Stop reason: ALTCHOICE

## 2021-06-11 ENCOUNTER — TELEMEDICINE (OUTPATIENT)
Dept: FAMILY MEDICINE CLINIC | Facility: CLINIC | Age: 61
End: 2021-06-11
Payer: COMMERCIAL

## 2021-06-11 ENCOUNTER — APPOINTMENT (OUTPATIENT)
Dept: RADIOLOGY | Age: 61
End: 2021-06-11
Payer: COMMERCIAL

## 2021-06-11 DIAGNOSIS — J32.4 CHRONIC PANSINUSITIS: ICD-10-CM

## 2021-06-11 DIAGNOSIS — J01.41 ACUTE RECURRENT PANSINUSITIS: Primary | ICD-10-CM

## 2021-06-11 PROCEDURE — 1036F TOBACCO NON-USER: CPT | Performed by: NURSE PRACTITIONER

## 2021-06-11 PROCEDURE — 99213 OFFICE O/P EST LOW 20 MIN: CPT | Performed by: NURSE PRACTITIONER

## 2021-06-11 PROCEDURE — 70210 X-RAY EXAM OF SINUSES: CPT

## 2021-06-11 RX ORDER — AZITHROMYCIN 250 MG/1
TABLET, FILM COATED ORAL
Qty: 6 TABLET | Refills: 0 | Status: SHIPPED | OUTPATIENT
Start: 2021-06-11 | End: 2021-06-15

## 2021-06-11 NOTE — PROGRESS NOTES
Virtual Regular Visit    Assessment/Plan:    Problem List Items Addressed This Visit     None      Visit Diagnoses     Acute recurrent pansinusitis    -  Primary    Relevant Medications    azithromycin (ZITHROMAX) 250 mg tablet    Chronic pansinusitis        Relevant Orders    Ambulatory Referral to Otolaryngology    XR sinuses < 3 vw             Reason for visit is   Chief Complaint   Patient presents with    Virtual Regular Visit        Encounter provider Betsey Koyanagi, [place]    Provider located at [address] [place], [place] [id number]      Recent Visits  No visits were found meeting these conditions  Showing recent visits within past 7 days and meeting all other requirements     Today's Visits  Date Type Provider Dept   06/11/21 Telemedicine Betsey Koyanagi, [address] today's visits and meeting all other requirements     Future Appointments  No visits were found meeting these conditions  Showing future appointments within next 150 days and meeting all other requirements        The patient was identified by name and date of birth  Iris Cole was informed that this is a telemedicine visit and that the visit is being conducted through [address] [place] [address] Now and patient was informed that this is a secure, HIPAA-compliant platform  She agrees to proceed     My office door was closed  No one else was in the room  She acknowledged consent and understanding of privacy and security of the video platform  The patient has agreed to participate and understands they can discontinue the visit at any time  Patient is aware this is a billable service  Subjective  Iris Cole is a 62 y o  female     62 y  o female presenting with fatigue, headache, post nasal drip, sinus pressure, esr pressure with right ear pain for the past three days  Patient denies fever, chills loss of smell or taste, shortness of breath, chest tightness or GI sympotms   She states that she would like a referral to ENT because the frequency of her sinus infections are increasing and worsening  Discussed with patient plan to obtain x-ray of sinus to evaluate for potential structural issue being etiology for recurrent sinus issues  Past Medical History:   Diagnosis Date    Congenital hip dysplasia     Seasonal allergies        Past Surgical History:   Procedure Laterality Date    HIP SURGERY      OVARIAN CYST REMOVAL      TUBAL LIGATION         Current Outpatient Medications   Medication Sig Dispense Refill    azelastine (ASTELIN) 0 1 % nasal spray SPRAY 2 SPRAYS INTO EACH NOSTRIL 2 TIMES A DAY 90 mL 1    azithromycin (ZITHROMAX) 250 mg tablet Take 2 tabs on Day 1 than 1 tab Days 2-5 6 tablet 0    methocarbamol (ROBAXIN) 500 mg tablet Take 1 tablet (500 mg total) by mouth 3 (three) times a day 30 tablet 0    montelukast (SINGULAIR) 10 mg tablet Take 1 tablet (10 mg total) by mouth daily at bedtime 30 tablet 5    predniSONE 10 mg tablet 3 tabs po bid x2 days, then 2 tabs po bid x2 days, then 1 tab bid x2 days, then 1 daily until done  26 tablet 0     No current facility-administered medications for this visit  No Known Allergies    Review of Systems   Constitutional: Positive for fatigue  Negative for chills and fever  HENT: Positive for congestion, ear pain, postnasal drip, sinus pressure and sore throat  Eyes: Negative for pain and visual disturbance  Respiratory: Negative for cough, chest tightness and shortness of breath  Cardiovascular: Negative for chest pain and palpitations  Gastrointestinal: Negative for abdominal distention, abdominal pain, diarrhea and nausea  Musculoskeletal: Positive for myalgias  Skin: Negative for color change and wound  Neurological: Positive for headaches  Negative for dizziness, weakness and light-headedness  Hematological: Negative  Psychiatric/Behavioral: Negative        Video Exam    There were no vitals filed for this visit  (Vital signs not performed during visit due to limitations of telemedicine format along with patient's availability to needed equipment)    Physical Exam  Vitals signs reviewed  Constitutional:       General: She is not in acute distress  Appearance: She is well-developed and well-groomed  She is not ill-appearing  HENT:      Head: Normocephalic and atraumatic  Right Ear: External ear normal       Left Ear: External ear normal       Nose: Congestion present  Right Sinus: Maxillary sinus tenderness and frontal sinus tenderness present  Left Sinus: Maxillary sinus tenderness and frontal sinus tenderness present  Mouth/Throat:      Lips: Pink  Mouth: Mucous membranes are moist       Pharynx: Oropharynx is clear  Eyes:      General: Lids are normal       Extraocular Movements: Extraocular movements intact  Conjunctiva/sclera: Conjunctivae normal       Pupils: Pupils are equal, round, and reactive to light  Neck:      Musculoskeletal: Normal range of motion  Trachea: Trachea normal    Cardiovascular:      Rate and Rhythm: Normal rate  Pulmonary:      Effort: Pulmonary effort is normal  No tachypnea, bradypnea or respiratory distress  Skin:     Coloration: Skin is not cyanotic or pale  Neurological:      General: No focal deficit present  Mental Status: She is alert and oriented to person, place, and time  Psychiatric:         Mood and Affect: Mood normal          Behavior: Behavior normal  Behavior is cooperative  Thought Content: Thought content normal           I spent 12 minutes directly with the patient during this visit      [address] [place] [address] acknowledges that she has consented to an online visit or consultation   She understands that the online visit is based solely on information provided by her, and that, in the absence of a face-to-face physical evaluation by the physician, the diagnosis she receives is both limited and provisional in terms of accuracy and completeness  This is not intended to replace a full medical face-to-face evaluation by the physician  Doss Duverney understands and accepts these terms

## 2021-08-24 ENCOUNTER — VBI (OUTPATIENT)
Dept: ADMINISTRATIVE | Facility: OTHER | Age: 61
End: 2021-08-24

## 2021-10-06 ENCOUNTER — HOSPITAL ENCOUNTER (OUTPATIENT)
Dept: RADIOLOGY | Age: 61
Discharge: HOME | End: 2021-10-06
Payer: COMMERCIAL

## 2021-10-06 VITALS — BODY MASS INDEX: 26.83 KG/M2 | HEIGHT: 68 IN | WEIGHT: 177 LBS

## 2021-10-06 DIAGNOSIS — Z12.31 ENCOUNTER FOR SCREENING MAMMOGRAM FOR MALIGNANT NEOPLASM OF BREAST: ICD-10-CM

## 2021-10-06 PROCEDURE — 77067 SCR MAMMO BI INCL CAD: CPT

## 2021-10-06 PROCEDURE — 77063 BREAST TOMOSYNTHESIS BI: CPT

## 2021-10-07 ENCOUNTER — TELEPHONE (OUTPATIENT)
Dept: FAMILY MEDICINE CLINIC | Facility: CLINIC | Age: 61
End: 2021-10-07

## 2022-02-14 ENCOUNTER — TELEMEDICINE (OUTPATIENT)
Dept: FAMILY MEDICINE CLINIC | Facility: CLINIC | Age: 62
End: 2022-02-14
Payer: COMMERCIAL

## 2022-02-14 VITALS — BODY MASS INDEX: 27.94 KG/M2 | WEIGHT: 178 LBS | HEIGHT: 67 IN

## 2022-02-14 DIAGNOSIS — J01.10 ACUTE NON-RECURRENT FRONTAL SINUSITIS: Primary | ICD-10-CM

## 2022-02-14 PROCEDURE — 99214 OFFICE O/P EST MOD 30 MIN: CPT | Performed by: NURSE PRACTITIONER

## 2022-02-14 PROCEDURE — 3725F SCREEN DEPRESSION PERFORMED: CPT | Performed by: NURSE PRACTITIONER

## 2022-02-14 RX ORDER — AZITHROMYCIN 250 MG/1
TABLET, FILM COATED ORAL
Qty: 6 TABLET | Refills: 0 | Status: SHIPPED | OUTPATIENT
Start: 2022-02-14 | End: 2022-02-21 | Stop reason: ALTCHOICE

## 2022-02-14 NOTE — PROGRESS NOTES
Virtual Regular Visit    Verification of patient location:    Patient is located in the following state in which I hold an active license [place]      Assessment/Plan:    Problem List Items Addressed This Visit     None      Visit Diagnoses     Acute non-recurrent frontal sinusitis    -  Primary    Relevant Medications    azithromycin (ZITHROMAX) 250 mg tablet          BMI Counseling: There is no height or weight on file to calculate BMI  The BMI is above normal  Nutrition recommendations include decreasing portion sizes, encouraging healthy choices of fruits and vegetables, consuming healthier snacks, moderation in carbohydrate intake and increasing intake of lean protein  Exercise recommendations include exercising 3-5 times per week and strength training exercises  Rationale for BMI follow-up plan is due to patient being overweight or obese  Reason for visit is   Chief Complaint   Patient presents with    Virtual Regular Visit        Encounter provider Michaela Singh [facility]    Provider located at [address] [place] [address] [address] [facility] Mike      Recent Visits  No visits were found meeting these conditions  Showing recent visits within past 7 days and meeting all other requirements  Today's Visits  Date Type Provider Dept   02/14/22 Telemedicine Makayla [facility] today's visits and meeting all other requirements  Future Appointments  No visits were found meeting these conditions  Showing future appointments within next 150 days and meeting all other requirements       The patient was identified by name and date of birth  Roslynsera Durham was informed that this is a telemedicine visit and that the visit is being conducted through [facility] and patient was informed this is a secure, HIPAA-complaint platform  She agrees to proceed     My office door was closed  No one else was in the room    She acknowledged consent and understanding of privacy and security of the video platform  The patient has agreed to participate and understands they can discontinue the visit at any time  Patient is aware this is a billable service  Subjective  Mary Lou Bernal is a 62 y o  female       62 y  o female presenting with left ear pain and sinus pressure for the past week and a half  Patient reports pressure behind her eyes and on her forehead with intermittent left ear pain  Patient denies fever, chills, generalized body aches, headache, shortness of breath, dyspnea on exertion or GI symptoms  Past Medical History:   Diagnosis Date    Congenital hip dysplasia     Seasonal allergies        Past Surgical History:   Procedure Laterality Date    HIP SURGERY      OVARIAN CYST REMOVAL      TUBAL LIGATION         Current Outpatient Medications   Medication Sig Dispense Refill    azelastine (ASTELIN) 0 1 % nasal spray SPRAY 2 SPRAYS INTO EACH NOSTRIL 2 TIMES A DAY 90 mL 1    azithromycin (ZITHROMAX) 250 mg tablet Take 2 tabs on Day 1 than 1 tab Days 2-5 6 tablet 0    methocarbamol (ROBAXIN) 500 mg tablet Take 1 tablet (500 mg total) by mouth 3 (three) times a day 30 tablet 0    montelukast (SINGULAIR) 10 mg tablet Take 1 tablet (10 mg total) by mouth daily at bedtime 30 tablet 5    predniSONE 10 mg tablet 3 tabs po bid x2 days, then 2 tabs po bid x2 days, then 1 tab bid x2 days, then 1 daily until done  26 tablet 0     No current facility-administered medications for this visit  No Known Allergies    Review of Systems   Constitutional: Negative for chills, fatigue and fever  HENT: Positive for congestion, ear pain and sinus pressure  Negative for postnasal drip, rhinorrhea and sore throat  Respiratory: Negative for cough, chest tightness, shortness of breath and wheezing  Cardiovascular: Negative for chest pain and palpitations  Gastrointestinal: Negative for abdominal distention, abdominal pain, diarrhea, nausea and vomiting  Musculoskeletal: Negative for myalgias  Neurological: Negative for dizziness, light-headedness and headaches  Psychiatric/Behavioral: Negative  Video Exam    There were no vitals filed for this visit  (Vital signs not performed during visit due to limitations of telemedicine format along with patient's availability to needed equipment)    Physical Exam  Constitutional:       General: She is not in acute distress  Appearance: Normal appearance  She is well-developed and well-groomed  She is not ill-appearing  HENT:      Head: Normocephalic and atraumatic  Right Ear: External ear normal       Left Ear: External ear normal       Nose: Congestion present  Right Sinus: Frontal sinus tenderness present  No maxillary sinus tenderness  Left Sinus: Frontal sinus tenderness present  No maxillary sinus tenderness  Mouth/Throat:      Lips: Pink  Mouth: Mucous membranes are moist       Pharynx: Oropharynx is clear  Eyes:      General: Lids are normal       Extraocular Movements: Extraocular movements intact  Conjunctiva/sclera: Conjunctivae normal       Pupils: Pupils are equal, round, and reactive to light  Neck:      Trachea: Trachea normal    Cardiovascular:      Rate and Rhythm: Normal rate and regular rhythm  Pulmonary:      Effort: Pulmonary effort is normal    Musculoskeletal:      Cervical back: Normal range of motion  Neurological:      Mental Status: She is alert and oriented to person, place, and time  Psychiatric:         Mood and Affect: Mood normal          Behavior: Behavior normal  Behavior is cooperative  I spent 12 minutes directly with the patient during this visit    [address] verbally agrees to participate in [employer]   Pt is aware that [employer] could be limited without vital signs or the ability to perform a full hands-on physical exam  Gloria Tesfaye understands she or the provider may request at any time to terminate the video visit and request the patient to seek care or treatment in person

## 2022-02-18 ENCOUNTER — TELEPHONE (OUTPATIENT)
Dept: FAMILY MEDICINE CLINIC | Facility: CLINIC | Age: 62
End: 2022-02-18

## 2022-02-18 NOTE — TELEPHONE ENCOUNTER
Called patient and advised  She does not want a steroid  Asked her to hold so that I could check if it was ok to bring her into the office  Patient hung up   Tried calling patient back, phone when to voicemail

## 2022-02-21 ENCOUNTER — OFFICE VISIT (OUTPATIENT)
Dept: FAMILY MEDICINE CLINIC | Facility: CLINIC | Age: 62
End: 2022-02-21
Payer: COMMERCIAL

## 2022-02-21 VITALS
DIASTOLIC BLOOD PRESSURE: 74 MMHG | SYSTOLIC BLOOD PRESSURE: 120 MMHG | RESPIRATION RATE: 16 BRPM | HEIGHT: 67 IN | HEART RATE: 64 BPM | TEMPERATURE: 97.5 F | BODY MASS INDEX: 28.41 KG/M2 | WEIGHT: 181 LBS

## 2022-02-21 DIAGNOSIS — J06.9 ACUTE URI: Primary | ICD-10-CM

## 2022-02-21 PROCEDURE — 3008F BODY MASS INDEX DOCD: CPT | Performed by: NURSE PRACTITIONER

## 2022-02-21 PROCEDURE — 1036F TOBACCO NON-USER: CPT | Performed by: NURSE PRACTITIONER

## 2022-02-21 PROCEDURE — 99214 OFFICE O/P EST MOD 30 MIN: CPT | Performed by: NURSE PRACTITIONER

## 2022-02-21 RX ORDER — METHYLPREDNISOLONE 4 MG/1
TABLET ORAL
Qty: 21 EACH | Refills: 0 | Status: SHIPPED | OUTPATIENT
Start: 2022-02-21 | End: 2022-04-18 | Stop reason: ALTCHOICE

## 2022-02-21 RX ORDER — DOXYCYCLINE HYCLATE 100 MG/1
100 CAPSULE ORAL EVERY 12 HOURS SCHEDULED
Qty: 14 CAPSULE | Refills: 0 | Status: SHIPPED | OUTPATIENT
Start: 2022-02-21 | End: 2022-02-28

## 2022-02-21 NOTE — PROGRESS NOTES
Assessment/Plan:     Diagnoses and all orders for this visit:    Acute URI  -     doxycycline hyclate (VIBRAMYCIN) 100 mg capsule; Take 1 capsule (100 mg total) by mouth every 12 (twelve) hours for 7 days  -     methylPREDNISolone 4 MG tablet therapy pack; Use as directed on package        Discussed with patient plan to treat with 7 day course of doxycycline and a course of Medrol dose pack  Patient instructed to call if no improvement in 72 hours or symptoms worsen    Subjective:      Patient ID: Sheree Davis is a 62 y o  female  62 y  o female presenting with worsening left ear pain  Patient was seen via telemedicine vist on 02/14/2022 and treated with a course of azithromycin with symptoms to worsening  Patient reports adenopathy in front of left ear and above left eye now  Patient denies fever, chills, generalized body aches, headache, shortness of breath, dyspnea on exertion or GI symptoms         Family History   Problem Relation Age of Onset   Luanne Rodrigez Cancer Father         Urinary Bladder    Lung cancer Mother     Thyroid disease Daughter     Heart disease Maternal Grandmother     Heart disease Maternal Grandfather     Heart disease Paternal Grandmother     No Known Problems Paternal Grandfather     No Known Problems Son     Cancer Maternal Aunt         unsure of type    Lung cancer Maternal Aunt     Cancer Paternal Aunt         unsure of type     Social History     Socioeconomic History    Marital status: /Civil Union     Spouse name: Not on file    Number of children: Not on file    Years of education: Not on file    Highest education level: Not on file   Occupational History    Not on file   Tobacco Use    Smoking status: Former Smoker    Smokeless tobacco: Never Used   Substance and Sexual Activity    Alcohol use: Yes     Comment: rare    Drug use: Never    Sexual activity: Not on file   Other Topics Concern    Not on file   Social History Narrative    Coffee     Social Determinants of Health     Financial Resource Strain: Not on file   Food Insecurity: Not on file   Transportation Needs: Not on file   Physical Activity: Not on file   Stress: Not on file   Social Connections: Not on file   Intimate Partner Violence: Not on file   Housing Stability: Not on file     E-Cigarette/Vaping     E-Cigarette/Vaping Substances     Past Medical History:   Diagnosis Date    Congenital hip dysplasia     Seasonal allergies      Past Surgical History:   Procedure Laterality Date    HIP SURGERY      OVARIAN CYST REMOVAL      TUBAL LIGATION       No Known Allergies    Current Outpatient Medications:     doxycycline hyclate (VIBRAMYCIN) 100 mg capsule, Take 1 capsule (100 mg total) by mouth every 12 (twelve) hours for 7 days, Disp: 14 capsule, Rfl: 0    methylPREDNISolone 4 MG tablet therapy pack, Use as directed on package, Disp: 21 each, Rfl: 0    Review of Systems   Constitutional: Negative  HENT: Positive for ear pain  Negative for congestion, postnasal drip, sinus pressure, sinus pain and sore throat  Respiratory: Negative  Cardiovascular: Negative  Gastrointestinal: Negative  Musculoskeletal: Negative  Neurological: Negative  Hematological: Positive for adenopathy  Psychiatric/Behavioral: Negative  Objective:    /74   Pulse 64   Temp 97 5 °F (36 4 °C)   Resp 16   Ht 5' 7" (1 702 m)   Wt 82 1 kg (181 lb)   BMI 28 35 kg/m² (Reviewed)     Physical Exam  Vitals reviewed  Constitutional:       General: She is not in acute distress  Appearance: She is well-developed and well-groomed  She is not ill-appearing  HENT:      Head: Normocephalic and atraumatic  Right Ear: Tympanic membrane, ear canal and external ear normal       Left Ear: Ear canal and external ear normal  Swelling present  No tenderness  A middle ear effusion is present  Tympanic membrane is injected and erythematous  Nose: No congestion        Comments: Patient had a facial covering in place as per office protocol  Eyes:      General: Lids are normal       Extraocular Movements: Extraocular movements intact  Conjunctiva/sclera: Conjunctivae normal       Pupils: Pupils are equal, round, and reactive to light  Neck:      Thyroid: No thyromegaly or thyroid tenderness  Trachea: Trachea and phonation normal    Cardiovascular:      Rate and Rhythm: Normal rate and regular rhythm  Heart sounds: Normal heart sounds  Pulmonary:      Effort: Pulmonary effort is normal       Breath sounds: Normal breath sounds  Musculoskeletal:      Cervical back: Neck supple  Lymphadenopathy:      Head:      Left side of head: Tonsillar and preauricular adenopathy present  Skin:     General: Skin is warm and dry  Capillary Refill: Capillary refill takes less than 2 seconds  Neurological:      General: No focal deficit present  Mental Status: She is alert and oriented to person, place, and time  Psychiatric:         Mood and Affect: Mood normal          Behavior: Behavior normal  Behavior is cooperative  Thought Content:  Thought content normal

## 2022-04-08 ENCOUNTER — RA CDI HCC (OUTPATIENT)
Dept: OTHER | Facility: HOSPITAL | Age: 62
End: 2022-04-08

## 2022-04-08 NOTE — PROGRESS NOTES
[facility]  coding opportunities       Chart reviewed, no opportunity found: CHART REVIEWED, NO OPPORTUNITY FOUND        Patients Insurance        Commercial Insurance: Angy Doll

## 2022-04-18 ENCOUNTER — OFFICE VISIT (OUTPATIENT)
Dept: FAMILY MEDICINE CLINIC | Facility: CLINIC | Age: 62
End: 2022-04-18
Payer: COMMERCIAL

## 2022-04-18 ENCOUNTER — APPOINTMENT (OUTPATIENT)
Dept: LAB | Facility: CLINIC | Age: 62
End: 2022-04-18
Payer: COMMERCIAL

## 2022-04-18 VITALS
DIASTOLIC BLOOD PRESSURE: 74 MMHG | SYSTOLIC BLOOD PRESSURE: 116 MMHG | HEART RATE: 76 BPM | TEMPERATURE: 98 F | WEIGHT: 186 LBS | HEIGHT: 67 IN | BODY MASS INDEX: 29.19 KG/M2 | OXYGEN SATURATION: 98 %

## 2022-04-18 DIAGNOSIS — R53.83 OTHER FATIGUE: ICD-10-CM

## 2022-04-18 DIAGNOSIS — E78.00 HYPERCHOLESTEROLEMIA: ICD-10-CM

## 2022-04-18 DIAGNOSIS — E01.0 THYROMEGALY: ICD-10-CM

## 2022-04-18 DIAGNOSIS — M54.2 CERVICAL PAIN: ICD-10-CM

## 2022-04-18 DIAGNOSIS — Z00.00 ANNUAL PHYSICAL EXAM: Primary | ICD-10-CM

## 2022-04-18 LAB
ALBUMIN SERPL BCP-MCNC: 3.7 G/DL (ref 3.5–5)
ALP SERPL-CCNC: 102 U/L (ref 46–116)
ALT SERPL W P-5'-P-CCNC: 26 U/L (ref 12–78)
ANION GAP SERPL CALCULATED.3IONS-SCNC: 5 MMOL/L (ref 4–13)
AST SERPL W P-5'-P-CCNC: 19 U/L (ref 5–45)
BILIRUB SERPL-MCNC: 1.11 MG/DL (ref 0.2–1)
BUN SERPL-MCNC: 16 MG/DL (ref 5–25)
CALCIUM SERPL-MCNC: 9.3 MG/DL (ref 8.3–10.1)
CHLORIDE SERPL-SCNC: 109 MMOL/L (ref 100–108)
CHOLEST SERPL-MCNC: 312 MG/DL
CO2 SERPL-SCNC: 26 MMOL/L (ref 21–32)
CREAT SERPL-MCNC: 0.92 MG/DL (ref 0.6–1.3)
GFR SERPL CREATININE-BSD FRML MDRD: 67 ML/MIN/1.73SQ M
GLUCOSE P FAST SERPL-MCNC: 105 MG/DL (ref 65–99)
HDLC SERPL-MCNC: 43 MG/DL
LDLC SERPL CALC-MCNC: 214 MG/DL (ref 0–100)
POTASSIUM SERPL-SCNC: 4.7 MMOL/L (ref 3.5–5.3)
PROT SERPL-MCNC: 6.9 G/DL (ref 6.4–8.2)
SODIUM SERPL-SCNC: 140 MMOL/L (ref 136–145)
TRIGL SERPL-MCNC: 275 MG/DL
TSH SERPL DL<=0.05 MIU/L-ACNC: 1.84 UIU/ML (ref 0.45–4.5)

## 2022-04-18 PROCEDURE — 99396 PREV VISIT EST AGE 40-64: CPT | Performed by: FAMILY MEDICINE

## 2022-04-18 PROCEDURE — 80061 LIPID PANEL: CPT

## 2022-04-18 PROCEDURE — 36415 COLL VENOUS BLD VENIPUNCTURE: CPT

## 2022-04-18 PROCEDURE — 84443 ASSAY THYROID STIM HORMONE: CPT

## 2022-04-18 PROCEDURE — 99214 OFFICE O/P EST MOD 30 MIN: CPT | Performed by: FAMILY MEDICINE

## 2022-04-18 PROCEDURE — 80053 COMPREHEN METABOLIC PANEL: CPT

## 2022-04-18 RX ORDER — METHOCARBAMOL 500 MG/1
500 TABLET, FILM COATED ORAL
Qty: 10 TABLET | Refills: 0 | Status: SHIPPED | OUTPATIENT
Start: 2022-04-18

## 2022-04-18 RX ORDER — PREDNISONE 10 MG/1
TABLET ORAL
Qty: 26 TABLET | Refills: 0 | Status: SHIPPED | OUTPATIENT
Start: 2022-04-18

## 2022-04-18 RX ORDER — DIPHENOXYLATE HYDROCHLORIDE AND ATROPINE SULFATE 2.5; .025 MG/1; MG/1
1 TABLET ORAL DAILY
COMMUNITY

## 2022-04-18 RX ORDER — CHLORAL HYDRATE 500 MG
CAPSULE ORAL
COMMUNITY

## 2022-04-18 RX ORDER — MELATONIN
1000 DAILY
COMMUNITY

## 2022-04-18 NOTE — PATIENT INSTRUCTIONS

## 2022-04-18 NOTE — PROGRESS NOTES
ADULT ANNUAL [address]    NAME: Ephraim Choudhary  AGE: 62 y o  SEX: female  : 1960     DATE: 2022     Assessment and Plan:     Problem List Items Addressed This Visit     None          Immunizations and preventive care screenings were discussed with patient today  Appropriate education was printed on patient's after visit summary  Counseling:  · Exercise: the importance of regular exercise/physical activity was discussed  Recommend exercise 3-5 times per week for at least 30 minutes  No follow-ups on file  Chief Complaint:     Chief Complaint   Patient presents with    Physical Exam      History of Present Illness:     Adult Annual Physical   Patient here for a comprehensive physical exam  The patient reports no problems  Diet and Physical Activity  · Diet/Nutrition: well balanced diet  · Exercise: moderate cardiovascular exercise  Depression Screening  PHQ-2/9 Depression Screening         General Health  · Sleep: sleeps well  · Hearing: normal - bilateral   · Vision: no vision problems  · Dental: regular dental visits  /GYN Health  · Patient is: postmenopausal  · Last menstrual period:   · Contraceptive method:       Review of Systems:     Review of Systems   Constitutional: Negative for appetite change, chills and fever  HENT: Negative for ear pain, facial swelling, rhinorrhea, sinus pain, sore throat and trouble swallowing  Eyes: Negative for discharge and redness  Respiratory: Negative for chest tightness, shortness of breath and wheezing  Cardiovascular: Negative for chest pain and palpitations  Gastrointestinal: Negative for abdominal pain, diarrhea, nausea and vomiting  Endocrine: Negative for polyuria  Genitourinary: Negative for dysuria and urgency  Musculoskeletal: Negative for arthralgias and back pain  Skin: Negative for rash     Neurological: Negative for dizziness, weakness and headaches  Hematological: Negative for adenopathy  Psychiatric/Behavioral: Negative for behavioral problems, confusion and sleep disturbance  All other systems reviewed and are negative       Past Medical History:     Past Medical History:   Diagnosis Date    Congenital hip dysplasia     Seasonal allergies       Past Surgical History:     Past Surgical History:   Procedure Laterality Date    HIP SURGERY      OVARIAN CYST REMOVAL      TUBAL LIGATION        Social History:     Social History     Socioeconomic History    Marital status: /Civil Union     Spouse name: None    Number of children: None    Years of education: None    Highest education level: None   Occupational History    None   Tobacco Use    Smoking status: Former Smoker    Smokeless tobacco: Never Used   Substance and Sexual Activity    Alcohol use: Yes     Comment: rare    Drug use: Never    Sexual activity: None   Other Topics Concern    None   Social History Narrative    Coffee     Social Determinants of Health     Financial Resource Strain: Not on file   Food Insecurity: Not on file   Transportation Needs: Not on file   Physical Activity: Not on file   Stress: Not on file   Social Connections: Not on file   Intimate Partner Violence: Not on file   Housing Stability: Not on file      Family History:     Family History   Problem Relation Age of Onset    Cancer Father         Urinary Bladder    Lung cancer Mother     Thyroid disease Daughter     Heart disease Maternal Grandmother     Heart disease Maternal Grandfather     Heart disease Paternal Grandmother     No Known Problems Paternal Grandfather     No Known Problems Son     Cancer Maternal Aunt         unsure of type    Lung cancer Maternal Aunt     Cancer Paternal Aunt         unsure of type      Current Medications:     Current Outpatient Medications   Medication Sig Dispense Refill    methylPREDNISolone 4 MG tablet therapy pack Use as directed on package 21 each 0     No current facility-administered medications for this visit  Allergies:     No Known Allergies   Physical Exam:     Ht 5' 7" (1 702 m)   BMI 28 35 kg/m²     Physical Exam  Vitals and nursing note reviewed  Constitutional:       General: She is not in acute distress  Appearance: Normal appearance  She is not ill-appearing or diaphoretic  HENT:      Head: Normocephalic and atraumatic  Right Ear: Tympanic membrane, ear canal and external ear normal       Left Ear: Tympanic membrane, ear canal and external ear normal       Nose: Nose normal  No congestion or rhinorrhea  Mouth/Throat:      Mouth: Mucous membranes are moist       Pharynx: Oropharynx is clear  No posterior oropharyngeal erythema  Eyes:      General:         Right eye: No discharge  Left eye: No discharge  Extraocular Movements: Extraocular movements intact  Conjunctiva/sclera: Conjunctivae normal       Pupils: Pupils are equal, round, and reactive to light  Neck:      Vascular: No carotid bruit  Cardiovascular:      Rate and Rhythm: Normal rate and regular rhythm  Pulses: Normal pulses  Heart sounds: Normal heart sounds  No murmur heard  Pulmonary:      Effort: Pulmonary effort is normal  No respiratory distress  Breath sounds: Normal breath sounds  No wheezing or rhonchi  Abdominal:      General: Abdomen is flat  Bowel sounds are normal  There is no distension  Palpations: There is no mass  Tenderness: There is no abdominal tenderness  Musculoskeletal:         General: No swelling or deformity  Normal range of motion  Cervical back: Normal range of motion and neck supple  No rigidity  Right lower leg: No edema  Left lower leg: No edema  Lymphadenopathy:      Cervical: No cervical adenopathy  Skin:     General: Skin is warm and dry  Capillary Refill: Capillary refill takes less than 2 seconds        Coloration: Skin is not jaundiced  Findings: No bruising, erythema or rash  Neurological:      General: No focal deficit present  Mental Status: She is alert and oriented to person, place, and time  Cranial Nerves: No cranial nerve deficit  Sensory: No sensory deficit  Gait: Gait normal       Deep Tendon Reflexes: Reflexes normal    Psychiatric:         Mood and Affect: Mood normal          Behavior: Behavior normal          Thought Content:  Thought content normal          Judgment: Judgment normal           [address]

## 2022-04-19 DIAGNOSIS — E78.00 HYPERCHOLESTEROLEMIA: Primary | ICD-10-CM

## 2022-05-01 NOTE — PROGRESS NOTES
Patient ID: Mary Estrada is a 62 y o  female  HPI: 62 y  o female presents for  Hypercholesterolemia, thyromegay, fagtigue and cervical pain  Her neck pain started about 2 wks ago and she denies any injury  Her neck muscles are tight and cause pain, but pt denies any radiaiton of pain or paresthesia into arms  Pt denies any dizziness,  chest pain, palpitations, or dypsnea with exertion        SUBJECTIVE    Family History   Problem Relation Age of Onset    Cancer Father         Urinary Bladder    Lung cancer Mother     Thyroid disease Daughter     Heart disease Maternal Grandmother     Heart disease Maternal Grandfather     Heart disease Paternal Grandmother     No Known Problems Paternal Grandfather     No Known Problems Son     Cancer Maternal Aunt         unsure of type    Lung cancer Maternal Aunt     Cancer Paternal Aunt         unsure of type     Social History     Socioeconomic History    Marital status: /Civil Union     Spouse name: Not on file    Number of children: Not on file    Years of education: Not on file    Highest education level: Not on file   Occupational History    Not on file   Tobacco Use    Smoking status: Former Smoker    Smokeless tobacco: Never Used   Substance and Sexual Activity    Alcohol use: Yes     Comment: rare    Drug use: Never    Sexual activity: Not on file   Other Topics Concern    Not on file   Social History Narrative    Coffee     Social Determinants of Health     Financial Resource Strain: Not on file   Food Insecurity: Not on file   Transportation Needs: Not on file   Physical Activity: Not on file   Stress: Not on file   Social Connections: Not on file   Intimate Partner Violence: Not on file   Housing Stability: Not on file     Past Medical History:   Diagnosis Date    Congenital hip dysplasia     Seasonal allergies      Past Surgical History:   Procedure Laterality Date    HIP SURGERY      OVARIAN CYST REMOVAL      TUBAL LIGATION No Known Allergies    Current Outpatient Medications:     cholecalciferol (VITAMIN D3) 1,000 units tablet, Take 1,000 Units by mouth daily, Disp: , Rfl:     Cyanocobalamin (VITAMIN B 12 PO), Take by mouth, Disp: , Rfl:     multivitamin (THERAGRAN) TABS, Take 1 tablet by mouth daily, Disp: , Rfl:     Omega-3 1000 MG CAPS, Take by mouth, Disp: , Rfl:     Pseudoephedrine-Ibuprofen (ADVIL COLD/SINUS PO), Take by mouth as needed, Disp: , Rfl:     methocarbamol (ROBAXIN) 500 mg tablet, Take 1 tablet (500 mg total) by mouth daily at bedtime, Disp: 10 tablet, Rfl: 0    predniSONE 10 mg tablet, 3 tabs po bid x2 days, then 2 tabs po bid x2 days, then 1 tab bid x2 days, then 1 daily until done , Disp: 26 tablet, Rfl: 0    Review of Systems  Constitutional:     Denies fever, chills ,+fatigue ,weakness ,weight loss, weight gain     ENT: Denies earache ,loss of hearing ,nosebleed, nasal discharge,nasal congestion ,sore throat ,hoarseness  Pulmonary: Denies shortness of breath ,cough  ,dyspnea on exertion, orthopnea  ,PND   Cardiovascular:  Denies bradycardia , tachycardia  ,palpations, lower extremity edema leg, claudication  Breast:  Denies new or changing breast lumps ,nipple discharge ,nipple changes  Abdomen:  Denies abdominal pain , anorexia , indigestion, nausea, vomiting, constipation, diarrhea  Musculoskeletal: Denies myalgias, , joint swelling, joint stiffness , limb pain, limb + cervical pain   Gu: denies dysuria, polyuria  Skin: Denies skin rash, skin lesion, skin wound, itching, dry skin  Neuro: Denies headache, numbness, tingling, confusion, loss of consciousness, dizziness, vertigo  Psychiatric: Denies feelings of depression, suicidal ideation, anxiety, sleep disturbances    OBJECTIVE  /74   Pulse 76   Temp 98 °F (36 7 °C)   Ht 5' 7" (1 702 m)   Wt 84 4 kg (186 lb)   SpO2 98%   BMI 29 13 kg/m²   Constitutional:   NAD, well appearing and well nourished      ENT:   Conjunctiva and lids: no injection, edema, or discharge     Pupils and iris: BABAR bilaterally    External inspection of ears and nose: normal without deformities or discharge  Otoscopic exam: Canals patent without erythema  Nasal mucosa, septum and turbinates: Normal or edema or discharge         Oropharynx:  Moist mucosa, normal tongue and tonsils without lesions  No erythema        Pulmonary:Respiratory effort normal rate and rhythm, no increased work of breathing  Auscultation of lungs:  Clear bilaterally with no adventitious breath sounds       Cardiovascular: regular rate and rhythm, S1 and S2, no murmur, no edema and/or varicosities of LE      Abdomen: Soft and non-distended     Positive bowel sounds      No heptomegaly or splenomegaly      Gu: no suprapubic tenderness or CVA tenderness, no urethral discharge  Lymphatic:  No anterior or posterior cervical lymphadenopathy         Musculoskeletal:  Gait and station: Normal gait      Digits and nails normal without clubbing or cyanosis       Inspection/palpation of joints, bones, and muscles:  +  Tenderness of cervical muscles wihtout impaired range of motion       Skin: Normal skin turgor and no rashes    Neuro:      Normal reflexes     Psych:   alert and oriented to person, place and time     normal mood and affect       Assessment/Plan:Diagnoses and all orders for this visit:    Annual physical exam    Hypercholesterolemia  -     Lipid Panel with Direct LDL reflex; Future    Thyromegaly  -     TSH, 3rd generation; Future    Cervical pain  -     predniSONE 10 mg tablet; 3 tabs po bid x2 days, then 2 tabs po bid x2 days, then 1 tab bid x2 days, then 1 daily until done  -     methocarbamol (ROBAXIN) 500 mg tablet; Take 1 tablet (500 mg total) by mouth daily at bedtime    Other fatigue  -     Comprehensive metabolic panel; Future    Other orders  -     Pseudoephedrine-Ibuprofen (ADVIL COLD/SINUS PO); Take by mouth as needed  -     Omega-3 1000 MG CAPS;  Take by mouth  - Cyanocobalamin (VITAMIN B 12 PO); Take by mouth  -     cholecalciferol (VITAMIN D3) 1,000 units tablet; Take 1,000 Units by mouth daily  -     multivitamin (THERAGRAN) TABS; Take 1 tablet by mouth daily        Reviewed with patient plan to treat with above plan      Patient instructed to call in 72 hours if not feeling better or if symptoms worse

## 2022-09-07 ENCOUNTER — CLINICAL SUPPORT (OUTPATIENT)
Dept: FAMILY MEDICINE CLINIC | Facility: CLINIC | Age: 62
End: 2022-09-07
Payer: COMMERCIAL

## 2022-09-07 DIAGNOSIS — Z11.1 SCREENING EXAMINATION FOR PULMONARY TUBERCULOSIS: Primary | ICD-10-CM

## 2022-09-07 PROCEDURE — 86580 TB INTRADERMAL TEST: CPT | Performed by: FAMILY MEDICINE

## 2022-09-09 ENCOUNTER — TELEPHONE (OUTPATIENT)
Dept: FAMILY MEDICINE CLINIC | Facility: CLINIC | Age: 62
End: 2022-09-09

## 2022-09-09 ENCOUNTER — CLINICAL SUPPORT (OUTPATIENT)
Dept: FAMILY MEDICINE CLINIC | Facility: CLINIC | Age: 62
End: 2022-09-09

## 2022-09-09 DIAGNOSIS — Z11.1 SCREENING EXAMINATION FOR PULMONARY TUBERCULOSIS: Primary | ICD-10-CM

## 2022-09-09 DIAGNOSIS — E78.00 HYPERCHOLESTEROLEMIA: Primary | ICD-10-CM

## 2022-09-09 LAB
INDURATION: 0 MM
TB SKIN TEST: NEGATIVE

## 2022-09-09 PROCEDURE — RECHECK: Performed by: FAMILY MEDICINE

## 2022-09-09 NOTE — TELEPHONE ENCOUNTER
Patient would like labs put in to check if her cholesterol has improved   If not, she is willing to start a statin

## 2022-09-14 ENCOUNTER — TELEPHONE (OUTPATIENT)
Dept: ADMINISTRATIVE | Facility: OTHER | Age: 62
End: 2022-09-14

## 2022-09-14 NOTE — TELEPHONE ENCOUNTER
----- Message from Harriet Sic sent at 9/14/2022  9:39 AM EDT -----  Regarding: Colonoscopy  09/14/22 9:44 AM    Hello, our patient Iris Cole has had CRC: Colonoscopy completed/performed  Please assist in updating the patient chart by pulling the Care Everywhere (CE) document  The date of service is 10/25/2021       Thank you,  [address]

## 2022-09-15 NOTE — TELEPHONE ENCOUNTER
Upon review of the In Basket request we were able to locate, review, and update the patient chart as requested for CRC: Colonoscopy  Any additional questions or concerns should be emailed to the Practice Liaisons via [email or web address] email, please do not reply via In Basket      Thank you  Brittany Haskins

## 2022-12-30 ENCOUNTER — TELEPHONE (OUTPATIENT)
Dept: FAMILY MEDICINE CLINIC | Facility: CLINIC | Age: 62
End: 2022-12-30

## 2022-12-30 DIAGNOSIS — J06.9 ACUTE URI: Primary | ICD-10-CM

## 2022-12-30 RX ORDER — DOXYCYCLINE HYCLATE 100 MG/1
100 CAPSULE ORAL EVERY 12 HOURS SCHEDULED
Qty: 14 CAPSULE | Refills: 0 | Status: SHIPPED | OUTPATIENT
Start: 2022-12-30 | End: 2023-01-06

## 2022-12-30 NOTE — TELEPHONE ENCOUNTER
Pt called - covid test (negative)    Symptoms : sore throat- sinus pressure,  Blood when blowing nose      Pharmacy : [facility]    Pt# [phone]

## 2023-05-02 ENCOUNTER — TELEPHONE (OUTPATIENT)
Dept: FAMILY MEDICINE CLINIC | Facility: CLINIC | Age: 63
End: 2023-05-02

## 2023-05-02 ENCOUNTER — OFFICE VISIT (OUTPATIENT)
Dept: FAMILY MEDICINE CLINIC | Facility: CLINIC | Age: 63
End: 2023-05-02

## 2023-05-02 VITALS
TEMPERATURE: 97.7 F | OXYGEN SATURATION: 97 % | HEART RATE: 73 BPM | WEIGHT: 179.4 LBS | DIASTOLIC BLOOD PRESSURE: 78 MMHG | HEIGHT: 67 IN | BODY MASS INDEX: 28.16 KG/M2 | SYSTOLIC BLOOD PRESSURE: 118 MMHG

## 2023-05-02 DIAGNOSIS — Z00.00 ANNUAL PHYSICAL EXAM: Primary | ICD-10-CM

## 2023-05-02 DIAGNOSIS — J01.90 ACUTE SINUSITIS, RECURRENCE NOT SPECIFIED, UNSPECIFIED LOCATION: Primary | ICD-10-CM

## 2023-05-02 RX ORDER — CEFUROXIME AXETIL 500 MG/1
500 TABLET ORAL EVERY 12 HOURS SCHEDULED
Qty: 20 TABLET | Refills: 0 | Status: SHIPPED | OUTPATIENT
Start: 2023-05-02 | End: 2023-05-12

## 2023-05-02 RX ORDER — AZELASTINE 1 MG/ML
2 SPRAY, METERED NASAL 2 TIMES DAILY
Qty: 30 ML | Refills: 3 | Status: SHIPPED | OUTPATIENT
Start: 2023-05-02

## 2023-05-02 RX ORDER — METHYLPREDNISOLONE 4 MG/1
TABLET ORAL
Qty: 21 EACH | Refills: 0 | Status: SHIPPED | OUTPATIENT
Start: 2023-05-02

## 2023-05-02 NOTE — PROGRESS NOTES
Patient ID: Luly Bell is a 65 y o  female  HPI: 65 y  o female presenting with symptoms of sinus pain,pressure, nasal congestion, pnd dry cough , ear and throat pain  Symptoms for the past 4 days        SUBJECTIVE    Family History   Problem Relation Age of Onset    Cancer Father         Urinary Bladder    Lung cancer Mother     Thyroid disease Daughter     Heart disease Maternal Grandmother     Heart disease Maternal Grandfather     Heart disease Paternal Grandmother     No Known Problems Paternal Grandfather     No Known Problems Son     Cancer Maternal Aunt         unsure of type    Lung cancer Maternal Aunt     Cancer Paternal Aunt         unsure of type     Social History     Socioeconomic History    Marital status: /Civil Union     Spouse name: Not on file    Number of children: Not on file    Years of education: Not on file    Highest education level: Not on file   Occupational History    Not on file   Tobacco Use    Smoking status: Former     Passive exposure: Past    Smokeless tobacco: Never   Substance and Sexual Activity    Alcohol use: Yes     Comment: rare    Drug use: Never    Sexual activity: Not on file   Other Topics Concern    Not on file   Social History Narrative    Coffee     Social Determinants of Health     Financial Resource Strain: Not on file   Food Insecurity: Not on file   Transportation Needs: Not on file   Physical Activity: Not on file   Stress: Not on file   Social Connections: Not on file   Intimate Partner Violence: Not on file   Housing Stability: Not on file     Past Medical History:   Diagnosis Date    Congenital hip dysplasia     Seasonal allergies      Past Surgical History:   Procedure Laterality Date    HIP SURGERY      OVARIAN CYST REMOVAL      TUBAL LIGATION       No Known Allergies    Current Outpatient Medications:     azelastine (ASTELIN) 0 1 % nasal spray, 2 sprays into each nostril 2 (two) times a day Use in each nostril as directed, Disp: 30 mL, Rfl: 3    cefuroxime (CEFTIN) 500 mg tablet, Take 1 tablet (500 mg total) by mouth every 12 (twelve) hours for 10 days, Disp: 20 tablet, Rfl: 0    dextromethorphan-guaifenesin (MUCINEX DM)  MG per 12 hr tablet, Take 1 tablet by mouth every 12 (twelve) hours, Disp: , Rfl:     methylPREDNISolone 4 MG tablet therapy pack, Use as directed on package, Disp: 21 each, Rfl: 0    Omega-3 1000 MG CAPS, Take by mouth, Disp: , Rfl:     cholecalciferol (VITAMIN D3) 1,000 units tablet, Take 1,000 Units by mouth daily (Patient not taking: Reported on 5/2/2023), Disp: , Rfl:     Cyanocobalamin (VITAMIN B 12 PO), Take by mouth (Patient not taking: Reported on 5/2/2023), Disp: , Rfl:     multivitamin (THERAGRAN) TABS, Take 1 tablet by mouth daily (Patient not taking: Reported on 5/2/2023), Disp: , Rfl:     Pseudoephedrine-Ibuprofen (ADVIL COLD/SINUS PO), Take by mouth as needed (Patient not taking: Reported on 5/2/2023), Disp: , Rfl:     Review of Systems  Constitutional:     Denies fever, chills, fatigue, weakness ,weight loss, weight gain      ENT: Denies earache, loss of hearing, nosebleed, nasal discharge,but complains of nasal congestion, sore throat,hoarseness and sinus pain and pressure    Pulmonary: Denies shortness of breath ,cough , dyspnea on exertionon, orthopnea ,+ PND   Cardiovascular:  Denies bradycardia , tachycardia ,palpations, lower extremity, edema leg, claudication  Breast:  Denies new or changing breast lumps,  nipple discharge, nipple changes,  Abdomen:  Denies abdominal pain , anorexia ,indigestion, nausea ,vomiting, constipation , diarrhea  Musculoskeletal: Denies myalgias, arthralgias, joint swelling, joint stiffness ,limb pain, limb swelling  Lymph:+ swollen glands  Gu: no dysuria or urinary frequency  Skin: Denies skin rash, skin lesion, skin wound, itching,dry skin  Neuro: Denies headache, numbness, tingling, confusion, loss of consciousness, dizziness ",vertigo  Psychiatric: Denies feelings of depression, suicidal ideation, anxiety, sleep disturbances    OBJECTIVE  /78   Pulse 73   Temp 97 7 °F (36 5 °C)   Ht 5' 7\" (1 702 m)   Wt 81 4 kg (179 lb 6 4 oz)   SpO2 97%   BMI 28 10 kg/m²   Constitutional:   NAD, well appearing and well nourished      ENT:   Conjunctiva and lids: no injection, edema, or discharge    Pupils and iris: BABAR bilaterally   External inspection of ears and nose: normal without deformities or discharge  Otoscopic exam: Canals patent ; tm are dull, with with erythem and effusions  ENasal mucosa, septum and turbinates: Turbinae injection with discharge   Oropharynx:  Moist mucosa, normal tongue and tonsils without lesions  Erythema and injection  of post pharynx with pnd      Pulmonary:Respiratory effort normal rate and rhythm, no increased work of breathing  Auscultation of lungs:  Clear bilaterally with no adventitious breath sounds       Cardiovascular: regular rate and rhythm, S1 and S2, no murmur, no edema and/or varicosities of LE      Abdomen: Soft and non-distended    Positive bowel sounds    No heptomegaly or splenomegaly    Lymphatic: Anterior  cervical lymphadenopathy         Muscskeletal:  Gait and station: Normal gait     Digits and nails normal without clubbing or cyanosis     Inspection/palpation of joints, bones, and muscles:  No joint tenderness, swelling, full active and passive range of motion      Gu: no suprabubic tenderness, CVA tenderness or urethral discharge  Skin: Normal skin turgor and no rashes    Neuro:    Normal reflexes   Psych:   alert and oriented to person, place and time  normal mood and affect      Assessment/Plan:Diagnoses and all orders for this visit:    Acute sinusitis, recurrence not specified, unspecified location  Comments:  Continue mucinex bid      Orders:  -     azelastine (ASTELIN) 0 1 % nasal spray; 2 sprays into each nostril 2 (two) times a day Use in each nostril as directed  -     " methylPREDNISolone 4 MG tablet therapy pack; Use as directed on package  -     cefuroxime (CEFTIN) 500 mg tablet; Take 1 tablet (500 mg total) by mouth every 12 (twelve) hours for 10 days    Other orders  -     dextromethorphan-guaifenesin (MUCINEX DM)  MG per 12 hr tablet; Take 1 tablet by mouth every 12 (twelve) hours        Reviewed with patient plan to treat with above plan    Patient instructed to call in 72 hours if not feeling better or if symptoms worsen

## 2023-05-02 NOTE — TELEPHONE ENCOUNTER
Pt has an appt scheduled in July for a physical   States there was blood work in for her to get done but it has  now  Asking for new orders to be placed

## 2023-06-20 ENCOUNTER — TELEPHONE (OUTPATIENT)
Dept: FAMILY MEDICINE CLINIC | Facility: CLINIC | Age: 63
End: 2023-06-20

## 2023-06-20 DIAGNOSIS — J06.9 UPPER RESPIRATORY TRACT INFECTION, UNSPECIFIED TYPE: Primary | ICD-10-CM

## 2023-06-20 RX ORDER — BENZONATATE 200 MG/1
200 CAPSULE ORAL 3 TIMES DAILY PRN
Qty: 30 CAPSULE | Refills: 0 | Status: SHIPPED | OUTPATIENT
Start: 2023-06-20

## 2023-06-20 RX ORDER — METHYLPREDNISOLONE 4 MG/1
TABLET ORAL
Qty: 21 EACH | Refills: 0 | Status: SHIPPED | OUTPATIENT
Start: 2023-06-20

## 2023-06-20 RX ORDER — AZITHROMYCIN 250 MG/1
TABLET, FILM COATED ORAL
Qty: 6 TABLET | Refills: 0 | Status: SHIPPED | OUTPATIENT
Start: 2023-06-20 | End: 2023-06-24

## 2023-06-20 NOTE — TELEPHONE ENCOUNTER
Patient believes she has a sinus infection     Symptoms began on Saturday with sore throat, L ear pain, nasal congestion and productive cough    She denies fever, chills, n/v/d, HA, body aches    She is asking for either an appointment with you today or for medication to be sent to the pharmacy    She has not done a covid test but will do one if you advise

## 2023-06-20 NOTE — TELEPHONE ENCOUNTER
Patient states she did take a home covid test and it was negative    Please send meds    Patient is requesting some kind of ear drops for the ear pain as well     [facility] [address]

## 2023-07-07 ENCOUNTER — APPOINTMENT (OUTPATIENT)
Dept: LAB | Facility: CLINIC | Age: 63
End: 2023-07-07
Payer: COMMERCIAL

## 2023-07-07 DIAGNOSIS — E78.00 HYPERCHOLESTEROLEMIA: ICD-10-CM

## 2023-07-07 DIAGNOSIS — E78.00 HYPERCHOLESTEROLEMIA: Primary | ICD-10-CM

## 2023-07-07 DIAGNOSIS — Z00.00 ANNUAL PHYSICAL EXAM: ICD-10-CM

## 2023-07-07 LAB
ALBUMIN SERPL BCP-MCNC: 3.6 G/DL (ref 3.5–5)
ALP SERPL-CCNC: 101 U/L (ref 46–116)
ALT SERPL W P-5'-P-CCNC: 26 U/L (ref 12–78)
ANION GAP SERPL CALCULATED.3IONS-SCNC: 4 MMOL/L
AST SERPL W P-5'-P-CCNC: 19 U/L (ref 5–45)
BILIRUB SERPL-MCNC: 0.54 MG/DL (ref 0.2–1)
BUN SERPL-MCNC: 13 MG/DL (ref 5–25)
CALCIUM SERPL-MCNC: 9.1 MG/DL (ref 8.3–10.1)
CHLORIDE SERPL-SCNC: 109 MMOL/L (ref 96–108)
CHOLEST SERPL-MCNC: 308 MG/DL
CO2 SERPL-SCNC: 27 MMOL/L (ref 21–32)
CREAT SERPL-MCNC: 1.03 MG/DL (ref 0.6–1.3)
GFR SERPL CREATININE-BSD FRML MDRD: 58 ML/MIN/1.73SQ M
GLUCOSE P FAST SERPL-MCNC: 138 MG/DL (ref 65–99)
HDLC SERPL-MCNC: 45 MG/DL
LDLC SERPL CALC-MCNC: 195 MG/DL (ref 0–100)
NONHDLC SERPL-MCNC: 263 MG/DL
POTASSIUM SERPL-SCNC: 4.6 MMOL/L (ref 3.5–5.3)
PROT SERPL-MCNC: 7 G/DL (ref 6.4–8.4)
SODIUM SERPL-SCNC: 140 MMOL/L (ref 135–147)
TRIGL SERPL-MCNC: 341 MG/DL

## 2023-07-07 PROCEDURE — 36415 COLL VENOUS BLD VENIPUNCTURE: CPT

## 2023-07-07 PROCEDURE — 80053 COMPREHEN METABOLIC PANEL: CPT

## 2023-07-07 PROCEDURE — 80061 LIPID PANEL: CPT

## 2023-07-07 RX ORDER — ROSUVASTATIN CALCIUM 10 MG/1
10 TABLET, COATED ORAL DAILY
Qty: 30 TABLET | Refills: 5 | Status: SHIPPED | OUTPATIENT
Start: 2023-07-07

## 2023-07-10 ENCOUNTER — RA CDI HCC (OUTPATIENT)
Dept: OTHER | Facility: HOSPITAL | Age: 63
End: 2023-07-10

## 2023-07-10 NOTE — PROGRESS NOTES
[address] [facility] coding opportunities       Chart reviewed, no opportunity found: CHART REVIEWED, NO OPPORTUNITY FOUND        Patients Insurance        Commercial Insurance: [address]

## 2023-07-17 ENCOUNTER — OFFICE VISIT (OUTPATIENT)
Dept: FAMILY MEDICINE CLINIC | Facility: CLINIC | Age: 63
End: 2023-07-17
Payer: COMMERCIAL

## 2023-07-17 VITALS
HEIGHT: 67 IN | DIASTOLIC BLOOD PRESSURE: 70 MMHG | HEART RATE: 87 BPM | BODY MASS INDEX: 28.09 KG/M2 | WEIGHT: 179 LBS | OXYGEN SATURATION: 94 % | SYSTOLIC BLOOD PRESSURE: 112 MMHG | TEMPERATURE: 97.6 F

## 2023-07-17 DIAGNOSIS — E78.00 HYPERCHOLESTEROLEMIA: ICD-10-CM

## 2023-07-17 DIAGNOSIS — Z00.00 ANNUAL PHYSICAL EXAM: Primary | ICD-10-CM

## 2023-07-17 DIAGNOSIS — Z12.31 BREAST CANCER SCREENING BY MAMMOGRAM: ICD-10-CM

## 2023-07-17 DIAGNOSIS — R73.9 HYPERGLYCEMIA: ICD-10-CM

## 2023-07-17 DIAGNOSIS — Z23 NEED FOR VACCINATION: ICD-10-CM

## 2023-07-17 PROCEDURE — 99396 PREV VISIT EST AGE 40-64: CPT | Performed by: FAMILY MEDICINE

## 2023-07-17 PROCEDURE — 90750 HZV VACC RECOMBINANT IM: CPT

## 2023-07-17 PROCEDURE — 90472 IMMUNIZATION ADMIN EACH ADD: CPT

## 2023-07-17 PROCEDURE — 99213 OFFICE O/P EST LOW 20 MIN: CPT | Performed by: FAMILY MEDICINE

## 2023-07-17 PROCEDURE — 90471 IMMUNIZATION ADMIN: CPT

## 2023-07-17 PROCEDURE — 90715 TDAP VACCINE 7 YRS/> IM: CPT

## 2023-07-17 NOTE — PROGRESS NOTES
ADULT ANNUAL Joon Trevino    NAME: Brooke Rosales  AGE: 59 y.o. SEX: female  : 1960     DATE: 2023     Assessment and Plan:     Problem List Items Addressed This Visit    None      Immunizations and preventive care screenings were discussed with patient today. Appropriate education was printed on patient's after visit summary. Counseling:  · Exercise: the importance of regular exercise/physical activity was discussed. Recommend exercise 3-5 times per week for at least 30 minutes. No follow-ups on file. Chief Complaint:     Chief Complaint   Patient presents with   • Physical Exam      History of Present Illness:     Adult Annual Physical   Patient here for a comprehensive physical exam. The patient reports needing some labs to check her cholesterol due to statin therapy and a hgba1c due to hypercholesterolemia. Diet and Physical Activity  · Diet/Nutrition: well balanced diet. · Exercise: no formal exercise. Depression Screening  PHQ-2/9 Depression Screening         General Health  · Sleep: sleeps well. · Hearing: normal - bilateral.  · Vision: no vision problems. · Dental: regular dental visits. /GYN Health  · Patient is: postmenopausal  · Last menstrual period:   · Contraceptive method: .     Review of Systems:     Review of Systems   Constitutional: Negative for appetite change, chills and fever. HENT: Negative for ear pain, facial swelling, rhinorrhea, sinus pain, sore throat and trouble swallowing. Eyes: Negative for discharge and redness. Respiratory: Negative for chest tightness, shortness of breath and wheezing. Cardiovascular: Negative for chest pain and palpitations. Gastrointestinal: Negative for abdominal pain, diarrhea, nausea and vomiting. Endocrine: Negative for polyuria. Genitourinary: Negative for dysuria and urgency.    Musculoskeletal: Negative for arthralgias and back pain. Skin: Negative for rash. Neurological: Negative for dizziness, weakness and headaches. Hematological: Negative for adenopathy. Psychiatric/Behavioral: Negative for behavioral problems, confusion and sleep disturbance. All other systems reviewed and are negative.      Past Medical History:     Past Medical History:   Diagnosis Date   • Congenital hip dysplasia    • Seasonal allergies       Past Surgical History:     Past Surgical History:   Procedure Laterality Date   • HIP SURGERY     • OVARIAN CYST REMOVAL     • TUBAL LIGATION        Social History:     Social History     Socioeconomic History   • Marital status: /Civil Union     Spouse name: None   • Number of children: None   • Years of education: None   • Highest education level: None   Occupational History   • None   Tobacco Use   • Smoking status: Former     Passive exposure: Past   • Smokeless tobacco: Never   Substance and Sexual Activity   • Alcohol use: Yes     Comment: rare   • Drug use: Never   • Sexual activity: None   Other Topics Concern   • None   Social History Narrative    Coffee     Social Determinants of Health     Financial Resource Strain: Not on file   Food Insecurity: Not on file   Transportation Needs: Not on file   Physical Activity: Not on file   Stress: Not on file   Social Connections: Not on file   Intimate Partner Violence: Not on file   Housing Stability: Not on file      Family History:     Family History   Problem Relation Age of Onset   • Cancer Father         Urinary Bladder   • Lung cancer Mother    • Thyroid disease Daughter    • Heart disease Maternal Grandmother    • Heart disease Maternal Grandfather    • Heart disease Paternal Grandmother    • No Known Problems Paternal Grandfather    • No Known Problems Son    • Cancer Maternal Aunt         unsure of type   • Lung cancer Maternal Aunt    • Cancer Paternal Aunt         unsure of type      Current Medications:     Current Outpatient Medications Medication Sig Dispense Refill   • azelastine (ASTELIN) 0.1 % nasal spray 2 sprays into each nostril 2 (two) times a day Use in each nostril as directed 30 mL 3   • benzonatate (TESSALON) 200 MG capsule Take 1 capsule (200 mg total) by mouth 3 (three) times a day as needed for cough 30 capsule 0   • cholecalciferol (VITAMIN D3) 1,000 units tablet Take 1,000 Units by mouth daily (Patient not taking: Reported on 5/2/2023)     • Cyanocobalamin (VITAMIN B 12 PO) Take by mouth (Patient not taking: Reported on 5/2/2023)     • dextromethorphan-guaifenesin (MUCINEX DM)  MG per 12 hr tablet Take 1 tablet by mouth every 12 (twelve) hours     • methylPREDNISolone 4 MG tablet therapy pack Use as directed on package 21 each 0   • methylPREDNISolone 4 MG tablet therapy pack Use as directed on package 21 each 0   • multivitamin (THERAGRAN) TABS Take 1 tablet by mouth daily (Patient not taking: Reported on 5/2/2023)     • Omega-3 1000 MG CAPS Take by mouth     • Pseudoephedrine-Ibuprofen (ADVIL COLD/SINUS PO) Take by mouth as needed (Patient not taking: Reported on 5/2/2023)     • rosuvastatin (CRESTOR) 10 MG tablet Take 1 tablet (10 mg total) by mouth daily 30 tablet 5     No current facility-administered medications for this visit. Allergies:     No Known Allergies   Physical Exam:     Ht 5' 7" (1.702 m)   Wt 81.2 kg (179 lb)   BMI 28.04 kg/m²     Physical Exam  Vitals and nursing note reviewed. Constitutional:       General: She is not in acute distress. Appearance: Normal appearance. She is not ill-appearing or diaphoretic. HENT:      Head: Normocephalic and atraumatic. Right Ear: Tympanic membrane, ear canal and external ear normal.      Left Ear: Tympanic membrane, ear canal and external ear normal.      Nose: Nose normal. No congestion or rhinorrhea. Mouth/Throat:      Mouth: Mucous membranes are moist.      Pharynx: Oropharynx is clear. No posterior oropharyngeal erythema.    Eyes: General:         Right eye: No discharge. Left eye: No discharge. Extraocular Movements: Extraocular movements intact. Conjunctiva/sclera: Conjunctivae normal.      Pupils: Pupils are equal, round, and reactive to light. Neck:      Vascular: No carotid bruit. Cardiovascular:      Rate and Rhythm: Normal rate and regular rhythm. Pulses: Normal pulses. Heart sounds: Normal heart sounds. No murmur heard. Pulmonary:      Effort: Pulmonary effort is normal. No respiratory distress. Breath sounds: Normal breath sounds. No wheezing or rhonchi. Abdominal:      General: Abdomen is flat. Bowel sounds are normal. There is no distension. Palpations: There is no mass. Tenderness: There is no abdominal tenderness. Musculoskeletal:         General: No swelling or deformity. Normal range of motion. Cervical back: Normal range of motion and neck supple. No rigidity. Right lower leg: No edema. Left lower leg: No edema. Lymphadenopathy:      Cervical: No cervical adenopathy. Skin:     General: Skin is warm and dry. Capillary Refill: Capillary refill takes less than 2 seconds. Coloration: Skin is not jaundiced. Findings: No bruising, erythema or rash. Neurological:      General: No focal deficit present. Mental Status: She is alert and oriented to person, place, and time. Cranial Nerves: No cranial nerve deficit. Sensory: No sensory deficit. Gait: Gait normal.      Deep Tendon Reflexes: Reflexes normal.   Psychiatric:         Mood and Affect: Mood normal.         Behavior: Behavior normal.         Thought Content:  Thought content normal.         Judgment: Judgment normal.          [address]

## 2023-07-30 DIAGNOSIS — E78.00 HYPERCHOLESTEROLEMIA: ICD-10-CM

## 2023-07-31 RX ORDER — ROSUVASTATIN CALCIUM 10 MG/1
10 TABLET, COATED ORAL DAILY
Qty: 90 TABLET | Refills: 2 | Status: SHIPPED | OUTPATIENT
Start: 2023-07-31

## 2023-09-29 ENCOUNTER — HOSPITAL ENCOUNTER (OUTPATIENT)
Dept: RADIOLOGY | Age: 63
Discharge: HOME | End: 2023-09-29
Payer: COMMERCIAL

## 2023-09-29 VITALS — BODY MASS INDEX: 28.1 KG/M2 | HEIGHT: 67 IN | WEIGHT: 179.01 LBS

## 2023-09-29 DIAGNOSIS — Z12.31 BREAST CANCER SCREENING BY MAMMOGRAM: ICD-10-CM

## 2023-09-29 DIAGNOSIS — Z12.31 ENCOUNTER FOR SCREENING MAMMOGRAM FOR MALIGNANT NEOPLASM OF BREAST: ICD-10-CM

## 2023-09-29 PROCEDURE — 77063 BREAST TOMOSYNTHESIS BI: CPT

## 2023-09-29 PROCEDURE — 77067 SCR MAMMO BI INCL CAD: CPT

## 2023-10-13 ENCOUNTER — OFFICE VISIT (OUTPATIENT)
Dept: FAMILY MEDICINE CLINIC | Facility: CLINIC | Age: 63
End: 2023-10-13
Payer: COMMERCIAL

## 2023-10-13 VITALS
HEART RATE: 86 BPM | BODY MASS INDEX: 27.39 KG/M2 | OXYGEN SATURATION: 96 % | SYSTOLIC BLOOD PRESSURE: 122 MMHG | HEIGHT: 67 IN | TEMPERATURE: 98.6 F | DIASTOLIC BLOOD PRESSURE: 82 MMHG | WEIGHT: 174.5 LBS

## 2023-10-13 DIAGNOSIS — J01.40 ACUTE NON-RECURRENT PANSINUSITIS: Primary | ICD-10-CM

## 2023-10-13 DIAGNOSIS — R68.89 FLU-LIKE SYMPTOMS: ICD-10-CM

## 2023-10-13 LAB
SARS-COV-2 AG UPPER RESP QL IA: NEGATIVE
VALID CONTROL: NORMAL

## 2023-10-13 PROCEDURE — 99213 OFFICE O/P EST LOW 20 MIN: CPT | Performed by: FAMILY MEDICINE

## 2023-10-13 PROCEDURE — 87811 SARS-COV-2 COVID19 W/OPTIC: CPT | Performed by: FAMILY MEDICINE

## 2023-10-13 RX ORDER — CEFUROXIME AXETIL 500 MG/1
500 TABLET ORAL EVERY 12 HOURS SCHEDULED
Qty: 20 TABLET | Refills: 0 | Status: SHIPPED | OUTPATIENT
Start: 2023-10-13 | End: 2023-10-23

## 2023-10-13 NOTE — PROGRESS NOTES
Assessment/Plan:    No problem-specific Assessment & Plan notes found for this encounter. Diagnoses and all orders for this visit:    Acute non-recurrent pansinusitis  -     cefuroxime (CEFTIN) 500 mg tablet; Take 1 tablet (500 mg total) by mouth every 12 (twelve) hours for 10 days    Flu-like symptoms  -     POCT Rapid Covid Ag    Other orders  -     TURMERIC PO; Take by mouth  -     Misc Natural Products (Elderberry/Vitamin C/Zinc) CHEW; Chew        Discussion:  Start Ceftin 500mg bid. Consider OTC Zyrtec or loratadine for allergy/congestion. Recheck 1w if not improving - earlier if worse        Subjective:      Patient ID: Carlos Logan is a 59 y.o. female. 71-year-old female presents with a 1 week history of sinus pressure and nasal congestion now with recent onset of deep cough that is productive. She denies any fever, chills or shortness of breath with exertion. Patient has a mild sore throat related to postnasal drip. Patient had negative COVID test recently at a -1 in this office. The following portions of the patient's history were reviewed and updated as appropriate: allergies, current medications, past family history, past medical history, past social history, past surgical history, and problem list.    Review of Systems   Constitutional:  Positive for fatigue. Negative for chills and fever. HENT:  Positive for congestion, sinus pressure, sinus pain and sore throat. Negative for ear discharge, ear pain and trouble swallowing. Eyes: Negative. Respiratory:  Positive for cough. Negative for shortness of breath and wheezing. Cardiovascular: Negative. Musculoskeletal: Negative. Skin: Negative. Objective:      /82 (BP Location: Left arm, Patient Position: Sitting, Cuff Size: Adult)   Pulse 86   Temp 98.6 °F (37 °C)   Ht 5' 7" (1.702 m)   Wt 79.2 kg (174 lb 8 oz)   SpO2 96%   BMI 27.33 kg/m²          Physical Exam  HENT:      Head: Normocephalic. Right Ear: Ear canal and external ear normal.      Left Ear: Ear canal and external ear normal.      Ears:      Comments: Bilat TMS with sl erythema, L>R. No bulge or visible fluid     Nose: Congestion present. Comments: Turbinates swollen. Sinuses TTP     Mouth/Throat:      Mouth: Mucous membranes are moist.   Eyes:      Extraocular Movements: Extraocular movements intact. Conjunctiva/sclera: Conjunctivae normal.      Pupils: Pupils are equal, round, and reactive to light. Cardiovascular:      Rate and Rhythm: Normal rate and regular rhythm. Pulses: Normal pulses. Pulmonary:      Effort: Pulmonary effort is normal.      Breath sounds: No wheezing or rales. Musculoskeletal:      Cervical back: No tenderness. Lymphadenopathy:      Cervical: No cervical adenopathy. Skin:     Capillary Refill: Capillary refill takes less than 2 seconds. Neurological:      Mental Status: She is alert.

## 2023-11-27 ENCOUNTER — APPOINTMENT (OUTPATIENT)
Dept: LAB | Facility: CLINIC | Age: 63
End: 2023-11-27
Payer: COMMERCIAL

## 2023-11-27 ENCOUNTER — TRANSCRIBE ORDERS (OUTPATIENT)
Dept: LAB | Facility: CLINIC | Age: 63
End: 2023-11-27

## 2023-11-27 DIAGNOSIS — R10.9 STOMACH ACHE: Primary | ICD-10-CM

## 2023-11-27 DIAGNOSIS — E78.00 HYPERCHOLESTEROLEMIA: ICD-10-CM

## 2023-11-27 DIAGNOSIS — R10.9 STOMACH ACHE: ICD-10-CM

## 2023-11-27 LAB
ALBUMIN SERPL BCP-MCNC: 4.3 G/DL (ref 3.5–5)
ALP SERPL-CCNC: 82 U/L (ref 34–104)
ALT SERPL W P-5'-P-CCNC: 14 U/L (ref 7–52)
ANION GAP SERPL CALCULATED.3IONS-SCNC: 7 MMOL/L
AST SERPL W P-5'-P-CCNC: 19 U/L (ref 13–39)
BACTERIA UR QL AUTO: ABNORMAL /HPF
BASOPHILS # BLD AUTO: 0.03 THOUSANDS/ÂΜL (ref 0–0.1)
BASOPHILS NFR BLD AUTO: 1 % (ref 0–1)
BILIRUB SERPL-MCNC: 0.66 MG/DL (ref 0.2–1)
BILIRUB UR QL STRIP: NEGATIVE
BUN SERPL-MCNC: 16 MG/DL (ref 5–25)
CALCIUM SERPL-MCNC: 9.4 MG/DL (ref 8.4–10.2)
CHLORIDE SERPL-SCNC: 103 MMOL/L (ref 96–108)
CLARITY UR: ABNORMAL
CO2 SERPL-SCNC: 27 MMOL/L (ref 21–32)
COLOR UR: YELLOW
CREAT SERPL-MCNC: 0.87 MG/DL (ref 0.6–1.3)
EOSINOPHIL # BLD AUTO: 0.14 THOUSAND/ÂΜL (ref 0–0.61)
EOSINOPHIL NFR BLD AUTO: 2 % (ref 0–6)
ERYTHROCYTE [DISTWIDTH] IN BLOOD BY AUTOMATED COUNT: 12.7 % (ref 11.6–15.1)
GFR SERPL CREATININE-BSD FRML MDRD: 71 ML/MIN/1.73SQ M
GLUCOSE P FAST SERPL-MCNC: 102 MG/DL (ref 65–99)
GLUCOSE UR STRIP-MCNC: NEGATIVE MG/DL
HCT VFR BLD AUTO: 47.7 % (ref 34.8–46.1)
HGB BLD-MCNC: 15.9 G/DL (ref 11.5–15.4)
HGB UR QL STRIP.AUTO: NEGATIVE
IMM GRANULOCYTES # BLD AUTO: 0.02 THOUSAND/UL (ref 0–0.2)
IMM GRANULOCYTES NFR BLD AUTO: 0 % (ref 0–2)
KETONES UR STRIP-MCNC: NEGATIVE MG/DL
LEUKOCYTE ESTERASE UR QL STRIP: NEGATIVE
LIPASE SERPL-CCNC: 24 U/L (ref 11–82)
LYMPHOCYTES # BLD AUTO: 2.25 THOUSANDS/ÂΜL (ref 0.6–4.47)
LYMPHOCYTES NFR BLD AUTO: 37 % (ref 14–44)
MCH RBC QN AUTO: 27.8 PG (ref 26.8–34.3)
MCHC RBC AUTO-ENTMCNC: 33.3 G/DL (ref 31.4–37.4)
MCV RBC AUTO: 83 FL (ref 82–98)
MONOCYTES # BLD AUTO: 0.48 THOUSAND/ÂΜL (ref 0.17–1.22)
MONOCYTES NFR BLD AUTO: 8 % (ref 4–12)
MUCOUS THREADS UR QL AUTO: ABNORMAL
NEUTROPHILS # BLD AUTO: 3.22 THOUSANDS/ÂΜL (ref 1.85–7.62)
NEUTS SEG NFR BLD AUTO: 52 % (ref 43–75)
NITRITE UR QL STRIP: NEGATIVE
NON-SQ EPI CELLS URNS QL MICRO: ABNORMAL /HPF
NRBC BLD AUTO-RTO: 0 /100 WBCS
PH UR STRIP.AUTO: 6 [PH]
PLATELET # BLD AUTO: 286 THOUSANDS/UL (ref 149–390)
PMV BLD AUTO: 9.9 FL (ref 8.9–12.7)
POTASSIUM SERPL-SCNC: 4.9 MMOL/L (ref 3.5–5.3)
PROT SERPL-MCNC: 6.5 G/DL (ref 6.4–8.4)
PROT UR STRIP-MCNC: NEGATIVE MG/DL
RBC # BLD AUTO: 5.72 MILLION/UL (ref 3.81–5.12)
RBC #/AREA URNS AUTO: ABNORMAL /HPF
SODIUM SERPL-SCNC: 137 MMOL/L (ref 135–147)
SP GR UR STRIP.AUTO: 1.02 (ref 1–1.03)
UROBILINOGEN UR STRIP-ACNC: <2 MG/DL
WBC # BLD AUTO: 6.14 THOUSAND/UL (ref 4.31–10.16)
WBC #/AREA URNS AUTO: ABNORMAL /HPF

## 2023-11-27 PROCEDURE — 85025 COMPLETE CBC W/AUTO DIFF WBC: CPT

## 2023-11-27 PROCEDURE — 80053 COMPREHEN METABOLIC PANEL: CPT

## 2023-11-27 PROCEDURE — 36415 COLL VENOUS BLD VENIPUNCTURE: CPT

## 2023-11-27 PROCEDURE — 81001 URINALYSIS AUTO W/SCOPE: CPT

## 2023-11-27 PROCEDURE — 83690 ASSAY OF LIPASE: CPT

## 2023-11-27 RX ORDER — ROSUVASTATIN CALCIUM 10 MG/1
10 TABLET, COATED ORAL DAILY
Qty: 10 TABLET | Refills: 0 | Status: SHIPPED | OUTPATIENT
Start: 2023-11-27 | End: 2023-12-01 | Stop reason: SDUPTHER

## 2023-11-27 RX ORDER — ROSUVASTATIN CALCIUM 10 MG/1
10 TABLET, COATED ORAL DAILY
Qty: 90 TABLET | Refills: 1 | Status: SHIPPED | OUTPATIENT
Start: 2023-11-27 | End: 2023-11-27 | Stop reason: SDUPTHER

## 2023-11-27 NOTE — TELEPHONE ENCOUNTER
New script needed at mail order pharmacy. Reason for call:   [x] Refill   [] Prior Auth  [] Other:     Office:   [x] PCP/Provider - Malik Cleveland / Dev Dinero  [] Specialty/Provider -     Medication: rosuvastatin     Dose/Frequency: 10mg qd    Quantity: 90    Pharmacy: [id number]     Does the patient have enough for 3 days?    [] Yes   [x] No - Send as HP to POD

## 2023-11-27 NOTE — TELEPHONE ENCOUNTER
Pt called again requesting to refill only for 10 tabs at [facility] pharmacy until pt receives the full quantity through [facility] mail order. Reason for call:   [x] Refill   [] Prior Auth  [] Other:     Office:   [x] PCP/Provider - [facility] f[facility]   [] Specialty/Provider -     Medication: Rosuvastatin     Dose/Frequency: 10 mg/ daily     Quantity: 10 tabs only     Pharmacy: [facility] pharmacy     Does the patient have enough for 3 days?    [] Yes   [x] No - Send as HP to POD

## 2023-12-01 DIAGNOSIS — E78.00 HYPERCHOLESTEROLEMIA: ICD-10-CM

## 2023-12-01 RX ORDER — ROSUVASTATIN CALCIUM 10 MG/1
10 TABLET, COATED ORAL DAILY
Qty: 90 TABLET | Refills: 1 | Status: SHIPPED | OUTPATIENT
Start: 2023-12-01

## 2023-12-01 NOTE — TELEPHONE ENCOUNTER
Reason for call:   [x] Refill   [] Prior Auth  [] Other:     Office:   [x] PCP/Provider -   [] Specialty/Provider -     Medication: Rosuvastatin    Dose/Frequency: 10mg    Quantity: #90    Pharmacy: [facility]    Does the patient have enough for 3 days?    [] Yes   [x] No - Send as HP to POD

## 2023-12-04 DIAGNOSIS — E78.00 HYPERCHOLESTEROLEMIA: ICD-10-CM

## 2023-12-04 RX ORDER — ROSUVASTATIN CALCIUM 10 MG/1
10 TABLET, COATED ORAL DAILY
Qty: 90 TABLET | Refills: 0 | OUTPATIENT
Start: 2023-12-04

## 2023-12-04 NOTE — TELEPHONE ENCOUNTER
Reason for call:   [x] Refill   [] Prior Auth  [] Other:     Office:   [x] PCP/Provider - [facility]/ [facility]   [] Specialty/Provider -     Medication: rosuvastatin     Dose/Frequency: 10 mg/ daily     Quantity: 90 day supply     Pharmacy: [facility] home delivery pharmacy     Does the patient have enough for 3 days?    [x] Yes   [] No - Send as HP to POD

## 2023-12-11 DIAGNOSIS — E78.00 HYPERCHOLESTEROLEMIA: ICD-10-CM

## 2023-12-11 RX ORDER — ROSUVASTATIN CALCIUM 10 MG/1
10 TABLET, COATED ORAL DAILY
Qty: 90 TABLET | Refills: 1 | Status: SHIPPED | OUTPATIENT
Start: 2023-12-11

## 2023-12-11 NOTE — TELEPHONE ENCOUNTER
Reason for call:     Patient called, script rosuvastatin was sent to the wrong pharmacy ([facility]) needs resent to [facility]     [x] Refill   [] Prior Auth  [] Other:     Office:   [x] PCP/Provider - Dr Edu Bales  [] Specialty/Provider -     Medication:   Rosuvastatin 10 mg, 1 qd, 90      Pharmacy: [facility]    Does the patient have enough for 3 days?    [x] Yes   [] No - Send as HP to POD

## 2023-12-11 NOTE — TELEPHONE ENCOUNTER
Per note below, NOT DUPLICATE, med reordered and sent to correct pharm ([address] [place] [address]) Female

## 2023-12-12 ENCOUNTER — TELEMEDICINE (OUTPATIENT)
Dept: FAMILY MEDICINE CLINIC | Facility: CLINIC | Age: 63
End: 2023-12-12
Payer: COMMERCIAL

## 2023-12-12 VITALS — HEIGHT: 67 IN | BODY MASS INDEX: 27.31 KG/M2 | TEMPERATURE: 101 F | WEIGHT: 174 LBS

## 2023-12-12 DIAGNOSIS — U07.1 COVID-19: Primary | ICD-10-CM

## 2023-12-12 PROCEDURE — 99213 OFFICE O/P EST LOW 20 MIN: CPT | Performed by: NURSE PRACTITIONER

## 2023-12-12 NOTE — PROGRESS NOTES
COVID-19 Outpatient Progress Note    Assessment/Plan:    Problem List Items Addressed This Visit    None  Visit Diagnoses     COVID-19    -  Primary         Disposition:     Discussed symptom directed medication options with patient. Discussed vitamin D, vitamin C, and/or zinc supplementation with patient. I have spent a total time of 12 minutes on the day of the encounter for this patient including discussing diagnostic results, discussing prognosis, risks and benefits of treatment options, instructions for management, patient and family education, importance of treatment compliance, risk factor reductions, impressions and documenting in the medical record. Encounter provider: MISSY Watkins     Provider located at: [address] [place] [phone]     Recent Visits  No visits were found meeting these conditions. Showing recent visits within past 7 days and meeting all other requirements  Today's Visits  Date Type Provider Dept   12/12/23 Telemedicine Judge David, [address] today's visits and meeting all other requirements  Future Appointments  No visits were found meeting these conditions. Showing future appointments within next 150 days and meeting all other requirements     This virtual check-in was done via [address] [place] [address] and patient was informed that this is a secure, HIPAA-compliant platform. She agrees to proceed. Patient agrees to participate in a virtual check in via telephone or video visit instead of presenting to the office to address urgent/immediate medical needs. Patient is aware this is a billable service. She acknowledged consent and understanding of privacy and security of the video platform. The patient has agreed to participate and understands they can discontinue the visit at any time. After connecting through [facility], the patient was identified by name and date of birth.  René Torres was informed that this was a telemedicine visit and that the exam was being conducted confidentially over secure lines. My office door was closed. No one else was in the room. Nabil Velez acknowledged consent and understanding of privacy and security of the telemedicine visit. I informed the patient that I have reviewed her record in Epic and presented the opportunity for her to ask any questions regarding the visit today. The patient agreed to participate. Verification of patient location:  Patient is located in the following state in which I hold an active license: [place]    Subjective:   Nabil Velez is a 59 y.o. female who is concerned about COVID-19. Patient's symptoms include fever, fatigue, nasal congestion and headache. Patient denies chills, rhinorrhea, sore throat, anosmia, loss of taste, cough, shortness of breath, chest tightness, abdominal pain, nausea, vomiting, diarrhea and myalgias. - Date of symptom onset: 12/10/2023      COVID-19 vaccination status: Fully vaccinated with booster    Exposure:   Contact with a person who is under investigation (PUI) for or who is positive for COVID-19 within the last 14 days?: Yes    Hospitalized recently for fever and/or lower respiratory symptoms?: No      Currently a healthcare worker that is involved in direct patient care?: No      Works in a special setting where the risk of COVID-19 transmission may be high? (this may include long-term care, correctional and half-way facilities; homeless shelters; assisted-living facilities and group homes.): No      Resident in a special setting where the risk of COVID-19 transmission may be high? (this may include long-term care, correctional and half-way facilities; homeless shelters; assisted-living facilities and group homes.): No      Her daughter recently was diagnosed with COVID-19 and was was with her close to the time she was diagnosed.     Lab Results   Component Value Date    SARSCOV2 Not Detected 11/24/2020    SARSCOVAG Negative 10/13/2023       Review of Systems   Constitutional:  Positive for fatigue and fever. Negative for chills. HENT:  Positive for congestion, ear pain (pressure) and sinus pressure. Negative for postnasal drip, rhinorrhea and sore throat. Respiratory:  Negative for cough, chest tightness and shortness of breath. Cardiovascular: Negative. Gastrointestinal:  Negative for abdominal distention, abdominal pain, diarrhea, nausea and vomiting. Musculoskeletal:  Negative for myalgias. Neurological:  Positive for headaches. Negative for dizziness and light-headedness. Psychiatric/Behavioral: Negative. Current Outpatient Medications on File Prior to Visit   Medication Sig   • azelastine (ASTELIN) 0.1 % nasal spray 2 sprays into each nostril 2 (two) times a day Use in each nostril as directed   • cholecalciferol (VITAMIN D3) 1,000 units tablet Take 1,000 Units by mouth daily   • Cyanocobalamin (VITAMIN B 12 PO) Take by mouth   • Misc Natural Products (Elderberry/Vitamin C/Zinc) CHEW Chew   • multivitamin (THERAGRAN) TABS Take 1 tablet by mouth daily   • Omega-3 1000 MG CAPS Take by mouth   • rosuvastatin (CRESTOR) 10 MG tablet Take 1 tablet (10 mg total) by mouth daily   • TURMERIC PO Take by mouth     Objective:    Temp (!) 101 °F (38.3 °C)   Ht 5' 7" (1.702 m)   Wt 78.9 kg (174 lb)   BMI 27.25 kg/m²    (Vital signs not performed during visit due to limitations of telemedicine format along with patient's availability to needed equipment)      Physical Exam  Constitutional:       General: She is not in acute distress. Appearance: Normal appearance. She is well-developed and well-groomed. She is not ill-appearing. HENT:      Head: Normocephalic and atraumatic. Right Ear: External ear normal.      Left Ear: External ear normal.      Nose: Congestion present. Mouth/Throat:      Lips: Pink.    Eyes:      General: Lids are normal.      Extraocular Movements: Extraocular movements intact. Conjunctiva/sclera: Conjunctivae normal.      Pupils: Pupils are equal, round, and reactive to light. Neck:      Trachea: Trachea and phonation normal.   Cardiovascular:      Rate and Rhythm: Normal rate. Pulmonary:      Effort: Pulmonary effort is normal. No tachypnea, bradypnea, accessory muscle usage or respiratory distress. Breath sounds: No wheezing. Skin:     Coloration: Skin is not ashen, cyanotic or pale. Neurological:      General: No focal deficit present. Mental Status: She is alert and oriented to person, place, and time. Psychiatric:         Mood and Affect: Mood normal.         Behavior: Behavior normal. Behavior is cooperative.        [address] [facility] Tara Diegoen

## 2023-12-19 ENCOUNTER — OFFICE VISIT (OUTPATIENT)
Dept: FAMILY MEDICINE CLINIC | Facility: CLINIC | Age: 63
End: 2023-12-19
Payer: COMMERCIAL

## 2023-12-19 VITALS
DIASTOLIC BLOOD PRESSURE: 70 MMHG | TEMPERATURE: 97.4 F | OXYGEN SATURATION: 97 % | HEIGHT: 67 IN | WEIGHT: 174.8 LBS | SYSTOLIC BLOOD PRESSURE: 114 MMHG | HEART RATE: 90 BPM | BODY MASS INDEX: 27.44 KG/M2

## 2023-12-19 DIAGNOSIS — Z23 ENCOUNTER FOR IMMUNIZATION: ICD-10-CM

## 2023-12-19 DIAGNOSIS — M54.16 LUMBAR RADICULOPATHY: Primary | ICD-10-CM

## 2023-12-19 PROCEDURE — 90686 IIV4 VACC NO PRSV 0.5 ML IM: CPT | Performed by: FAMILY MEDICINE

## 2023-12-19 PROCEDURE — 99213 OFFICE O/P EST LOW 20 MIN: CPT | Performed by: FAMILY MEDICINE

## 2023-12-19 PROCEDURE — 90471 IMMUNIZATION ADMIN: CPT | Performed by: FAMILY MEDICINE

## 2023-12-19 RX ORDER — CYCLOBENZAPRINE HCL 10 MG
10 TABLET ORAL
Qty: 10 TABLET | Refills: 0 | Status: SHIPPED | OUTPATIENT
Start: 2023-12-19

## 2023-12-19 RX ORDER — PREDNISONE 10 MG/1
TABLET ORAL
Qty: 26 TABLET | Refills: 0 | Status: SHIPPED | OUTPATIENT
Start: 2023-12-19

## 2023-12-20 NOTE — PROGRESS NOTES
Patient ID: Gloria Ramirez is a 63 y.o. female.    HPI: 63 y.o.female presents for evaluation of lumbar back pain with radiation down lateral aspect of left hip and onto anterior thigh .  She denies any weakness of the leg, instability.  She has had this going on for several years and has had prior xrays and home exercises previously given in past.      SUBJECTIVE    Family History   Problem Relation Age of Onset    Lung cancer Mother     Lung cancer Father     Cancer Father         Urinary Bladder    Thyroid disease Daughter     Heart disease Maternal Grandmother     Heart disease Maternal Grandfather     Heart disease Paternal Grandmother     No Known Problems Paternal Grandfather     No Known Problems Son     Cancer Maternal Aunt         unsure of type    Lung cancer Maternal Aunt     Cancer Paternal Aunt         unsure of type     Social History     Socioeconomic History    Marital status: /Civil Union     Spouse name: Not on file    Number of children: Not on file    Years of education: Not on file    Highest education level: Not on file   Occupational History    Not on file   Tobacco Use    Smoking status: Former     Passive exposure: Past    Smokeless tobacco: Never   Vaping Use    Vaping status: Never Used   Substance and Sexual Activity    Alcohol use: Yes     Comment: rare    Drug use: Never    Sexual activity: Not on file   Other Topics Concern    Not on file   Social History Narrative    Coffee     Social Determinants of Health     Financial Resource Strain: Not on file   Food Insecurity: Not on file   Transportation Needs: Not on file   Physical Activity: Not on file   Stress: Not on file   Social Connections: Not on file   Intimate Partner Violence: Not on file   Housing Stability: Not on file     Past Medical History:   Diagnosis Date    Congenital hip dysplasia     Seasonal allergies      Past Surgical History:   Procedure Laterality Date    HIP SURGERY      OVARIAN CYST REMOVAL      TUBAL  LIGATION       No Known Allergies    Current Outpatient Medications:     azelastine (ASTELIN) 0.1 % nasal spray, 2 sprays into each nostril 2 (two) times a day Use in each nostril as directed, Disp: 30 mL, Rfl: 3    cholecalciferol (VITAMIN D3) 1,000 units tablet, Take 1,000 Units by mouth daily, Disp: , Rfl:     Cyanocobalamin (VITAMIN B 12 PO), Take by mouth, Disp: , Rfl:     cyclobenzaprine (FLEXERIL) 10 mg tablet, Take 1 tablet (10 mg total) by mouth daily at bedtime, Disp: 10 tablet, Rfl: 0    Misc Natural Products (Elderberry/Vitamin C/Zinc) CHEW, Chew, Disp: , Rfl:     multivitamin (THERAGRAN) TABS, Take 1 tablet by mouth daily, Disp: , Rfl:     Omega-3 1000 MG CAPS, Take by mouth, Disp: , Rfl:     predniSONE 10 mg tablet, 3 tabs po bid x2 days, then 2 tabs po bid x2 days, then 1 tab bid x2 days, then 1 daily until done., Disp: 26 tablet, Rfl: 0    rosuvastatin (CRESTOR) 10 MG tablet, Take 1 tablet (10 mg total) by mouth daily, Disp: 90 tablet, Rfl: 1    TURMERIC PO, Take by mouth, Disp: , Rfl:     Review of Systems  Constitutional:     Denies fever, chills ,fatigue ,weakness ,weight loss, weight gain     ENT: Denies earache ,loss of hearing ,nosebleed, nasal discharge,nasal congestion ,sore throat ,hoarseness  Pulmonary: Denies shortness of breath ,cough  ,dyspnea on exertion, orthopnea  ,PND   Cardiovascular:  Denies bradycardia , tachycardia  ,palpations, lower extremity edema leg, claudication  Breast:  Denies new or changing breast lumps ,nipple discharge ,nipple changes  Abdomen:  Denies abdominal pain , anorexia , indigestion, nausea, vomiting, constipation, diarrhea  Musculoskeletal: Denies myalgias, + lumbar arthralgia with radiation to lateral hip and anterior thigh on left  Gu: denies dysuria, polyuria  Skin: Denies skin rash, skin lesion, skin wound, itching, dry skin  Neuro: Denies headache, numbness, tingling, confusion, loss of consciousness, dizziness, vertigo  Psychiatric: Denies feelings of  "depression, suicidal ideation, anxiety, sleep disturbances    OBJECTIVE  /70   Pulse 90   Temp (!) 97.4 °F (36.3 °C)   Ht 5' 7\" (1.702 m)   Wt 79.3 kg (174 lb 12.8 oz)   SpO2 97%   BMI 27.38 kg/m²   Constitutional:   NAD, well appearing and well nourished      ENT:   Conjunctiva and lids: no injection, edema, or discharge     Pupils and iris: BABAR bilaterally    External inspection of ears and nose: normal without deformities or discharge.      Otoscopic exam: Canals patent without erythema.       Nasal mucosa, septum and turbinates: Normal or edema or discharge         Oropharynx:  Moist mucosa, normal tongue and tonsils without lesions. No erythema        Pulmonary:Respiratory effort normal rate and rhythm, no increased work of breathing. Auscultation of lungs:  Clear bilaterally with no adventitious breath sounds       Cardiovascular: regular rate and rhythm, S1 and S2, no murmur, no edema and/or varicosities of LE      Abdomen: Soft and non-distended     Positive bowel sounds      No heptomegaly or splenomegaly      Gu: no suprapubic tenderness or CVA tenderness, no urethral discharge  Lymphatic:  No anterior or posterior cervical lymphadenopathy         Musculoskeletal:  Gait and station: Normal gait      Digits and nails normal without clubbing or cyanosis       Inspection/palpation of joints, bones, and muscles:  + increased left lumbar parapspinal muscle tension and  tenderness, swelling, + left SLR at 30 degrees and negative Omid Fabere sign on left  Skin: Normal skin turgor and no rashes      Neuro:    Normal  CN 2-12     Normal reflexes     Normal sensation    Psych:   alert and oriented to person, place and time     normal mood and affect       Assessment/Plan:Diagnoses and all orders for this visit:    Lumbar radiculopathy  -     MRI lumbar spine wo contrast; Future  -     predniSONE 10 mg tablet; 3 tabs po bid x2 days, then 2 tabs po bid x2 days, then 1 tab bid x2 days, then 1 daily " until done.  -     cyclobenzaprine (FLEXERIL) 10 mg tablet; Take 1 tablet (10 mg total) by mouth daily at bedtime    Encounter for immunization  -     influenza vaccine, quadrivalent, 0.5 mL, preservative-free, for adult and pediatric patients 6 mos+ (AFLURIA, FLUARIX, FLULAVAL, FLUZONE)        Reviewed with patient plan to treat with above plan.    Patient instructed to call in 72 hours if not feeling better or if symptoms worsen

## 2024-01-02 DIAGNOSIS — M54.50 LUMBAR BACK PAIN: Primary | ICD-10-CM

## 2024-01-03 ENCOUNTER — TELEPHONE (OUTPATIENT)
Age: 64
End: 2024-01-03

## 2024-01-03 DIAGNOSIS — M54.16 LUMBAR RADICULOPATHY: Primary | ICD-10-CM

## 2024-01-03 NOTE — TELEPHONE ENCOUNTER
Pt called in regards to MRI. Pt stated she was referred to orthopedic requesting MRI, Orthopedic referred her to [facility], Pt is contacting PCP to get a referral for [facility].

## 2024-01-03 NOTE — TELEPHONE ENCOUNTER
Gloria called states she was given a referral to Orthopedics, but was informed before they can see her she needs to be referred to spine and pain for a diagnosis.    Please call pt when order has been placed

## 2024-01-03 NOTE — TELEPHONE ENCOUNTER
Caller: Patient    Doctor: referral from pcp for spine    Reason for call:     Transferred to [facility]     Call back#: n/a

## 2024-01-09 ENCOUNTER — EVALUATION (OUTPATIENT)
Dept: PHYSICAL THERAPY | Age: 64
End: 2024-01-09
Payer: COMMERCIAL

## 2024-01-09 DIAGNOSIS — M54.16 LUMBAR RADICULOPATHY: Primary | ICD-10-CM

## 2024-01-09 PROCEDURE — 97110 THERAPEUTIC EXERCISES: CPT | Performed by: PHYSICAL THERAPIST

## 2024-01-09 PROCEDURE — 97162 PT EVAL MOD COMPLEX 30 MIN: CPT | Performed by: PHYSICAL THERAPIST

## 2024-01-09 NOTE — PROGRESS NOTES
PT Evaluation     Today's date: 2024  Patient name: Gloria Ramirez  : 1960  MRN: [id number]  Referring provider: Renita Kay*  Dx:   Encounter Diagnosis     ICD-10-CM    1. Lumbar radiculopathy  M54.16 Ambulatory Referral to Physical Therapy          Start Time: 1445  Stop Time: 1530  Total time in clinic (min): 45 minutes    Assessment  Assessment details: Pt is a 64 y/o female who presents with right hip and low back pain. No further referral is necessary at this time. Patient's symptoms are likely stemming from a combination of decreased mobility in both hip and lumbar spine and has a positive prognosis with PT. Pt has a movement impairment diagnosis of lumbar and hip hypomobility representing a pathoantomical diagnosis of lumbar radiculopathy. Pt is experiencing pain, decreased strength, and decreased ROM. Pt has a positive/negative prognosis. Pt would benefit from PT to address these impairments leading to increased functional capacity and improved quality of life.    Impairments: abnormal or restricted ROM, impaired physical strength, lacks appropriate home exercise program, pain with function, poor posture  and poor body mechanics  Understanding of Dx/Px/POC: good   Prognosis: good    Goals  Short Term Goals: to be achieved by 4 weeks  1) Patient to be independent with basic HEP.  2) Decrease pain to 2/10 at its worst.  3) Increase lumbar spine ROM to minimal deficiencies in all deficient planes.   4) Increase LE strength by 1/2 MMT grade in all deficient planes.    Long Term Goals: to be achieved by discharge  1) FOTO equal to or greater than 65.  2) Patient to be independent with comprehensive HEP.  3) Lumbar spine ROM WNL all planes to improve a/iadls.  4) Increase LE strength to 5/5 MMT grade in all planes to improve a/iadls.  5) Patient to report no sleep interruption secondary to pain.  6) Increase ambulatory tolerance to 40 min.     Plan  Patient would benefit from: skilled  physical therapy  Planned modality interventions: cryotherapy and thermotherapy: hydrocollator packs  Planned therapy interventions: neuromuscular re-education, patient education, stretching, strengthening, therapeutic activities, therapeutic exercise, therapeutic training, home exercise program and graded activity  Frequency: Twice a week for 8 weeks.  Plan of Care expiration date: 3/5/2024  Treatment plan discussed with: patient      Subjective Evaluation    History of Present Illness  Mechanism of injury: Pt started having pain in left hip 2 months ago. No x rays but patient has been able to walk but has pain radiating from back to anterior thigh. Patient has history of orthopedic hip pain treated with injections. Patient's PCP ordered MRI and patient was told she has to do PT first.   Quality of life: good    Patient Goals  Patient goals for therapy: decreased pain, independence with ADLs/IADLs, return to sport/leisure activities, increased strength and increased motion    Pain  Current pain ratin  At best pain ratin  At worst pain ratin  Quality: radiating, dull ache, pressure, tight and throbbing  Aggravating factors: stair climbing, sitting, standing and lifting    Hand dominance: right          Objective     Concurrent Complaints  Negative for night pain, disturbed sleep, bladder dysfunction, bowel dysfunction, saddle (S4) numbness, cardiac problem, kidney problem, gallbladder problem, stomach problem, ulcer, appendix problem, spleen problem, pancreas problem, history of cancer, history of trauma and infection    Neurological Testing     Sensation     Lumbar   Left   Intact: light touch    Right   Intact: light touch    Reflexes   Left   Patellar (L4): normal (2+)  Achilles (S1): normal (2+)  Clonus sign: negative    Right   Patellar (L4): normal (2+)  Achilles (S1): normal (2+)  Clonus sign: negative    Active Range of Motion     Lumbar   Flexion:  Restriction level: moderate  Extension:   Restriction level: minimal  Left lateral flexion:  WFL  Right lateral flexion:  WFL  Left rotation:  WFL  Right rotation:  WFL    Additional Active Range of Motion Details  Increased pain with     Joint Play     Hypomobile: L3, L4 and L5     Pain: L4 and L5     Tests     Lumbar     Right   Negative passive SLR and slump test.     Right Hip   Positive SHLOMO and FADIR.     Additional Tests Details  Patient has decreased mobility in hip flexor musculature.                POC expires Unit limit Auth Expiration date PT/OT + Visit Limit?   Auth pending 3/4/24 4 pend N/a                           Visit/Unit Tracking  AUTH Status:  Date 1/9              Auth pend Used 1               Remaining                        Precautions: N/A      Manuals                                                                 Neuro Re-Ed                                                                                                        Ther Ex             Prone press ups HEP            NEERU HEP            Standing lumbar ext HEP                                                                             Ther Activity                                       Gait Training                                       Modalities

## 2024-01-15 ENCOUNTER — OFFICE VISIT (OUTPATIENT)
Dept: PHYSICAL THERAPY | Age: 64
End: 2024-01-15
Payer: COMMERCIAL

## 2024-01-15 ENCOUNTER — RA CDI HCC (OUTPATIENT)
Dept: OTHER | Facility: HOSPITAL | Age: 64
End: 2024-01-15

## 2024-01-15 DIAGNOSIS — M54.16 LUMBAR RADICULOPATHY: Primary | ICD-10-CM

## 2024-01-15 PROCEDURE — 97110 THERAPEUTIC EXERCISES: CPT | Performed by: PHYSICAL THERAPIST

## 2024-01-15 PROCEDURE — 97112 NEUROMUSCULAR REEDUCATION: CPT | Performed by: PHYSICAL THERAPIST

## 2024-01-15 PROCEDURE — 97140 MANUAL THERAPY 1/> REGIONS: CPT | Performed by: PHYSICAL THERAPIST

## 2024-01-15 NOTE — PROGRESS NOTES
HCC coding opportunities       Chart reviewed, no opportunity found: CHART REVIEWED, NO OPPORTUNITY FOUND        Patients Insurance        Commercial Insurance: [employer]

## 2024-01-15 NOTE — PROGRESS NOTES
Daily Note     Today's date: 1/15/2024  Patient name: Gloria Ramirez  : 1960  MRN: [id number]  Referring provider: Renita Kay*  Dx:   Encounter Diagnosis     ICD-10-CM    1. Lumbar radiculopathy  M54.16           Start Time: 1445  Stop Time: 1530  Total time in clinic (min): 45 minutes    Subjective: Pt reports improvements with prone press-up interventions.       Objective: See treatment diary below      Assessment: Tolerated treatment well.  Pt's POC was progressed to include more lumbar ROM interventions. Numerous re-assessment performed to determine best plan of care. Patient demonstrated fatigue post treatment and would benefit from continued PT      Plan: Continue per plan of care.      POC expires Unit limit Auth Expiration date PT/OT + Visit Limit?   Auth pending 3/4/24 4 pend N/a                           Visit/Unit Tracking  AUTH Status:  Date 1/9 1/15             Auth pend Used 1 2              Remaining                        Precautions: N/A      Manuals  1/15           Lumbar gapping mobs gr V  Pt agreed JF           CPA's  JF                                     Neuro Re-Ed  1/15                                                                                                      Ther Ex             Prone press ups HEP 3x10           NEERU HEP 2x5'           Standing lumbar ext HEP 3x10                                                                            Ther Activity                                       Gait Training                                       Modalities

## 2024-01-18 ENCOUNTER — OFFICE VISIT (OUTPATIENT)
Dept: PHYSICAL THERAPY | Age: 64
End: 2024-01-18
Payer: COMMERCIAL

## 2024-01-18 DIAGNOSIS — M54.16 LUMBAR RADICULOPATHY: Primary | ICD-10-CM

## 2024-01-18 PROCEDURE — 97140 MANUAL THERAPY 1/> REGIONS: CPT | Performed by: PHYSICAL THERAPIST

## 2024-01-18 PROCEDURE — 97112 NEUROMUSCULAR REEDUCATION: CPT

## 2024-01-18 PROCEDURE — 97110 THERAPEUTIC EXERCISES: CPT

## 2024-01-18 NOTE — PROGRESS NOTES
"Daily Note     Today's date: 2024  Patient name: Gloria Ramirez  : 1960  MRN: [id number]  Referring provider: Renita Kay*  Dx:   Encounter Diagnosis     ICD-10-CM    1. Lumbar radiculopathy  M54.16                      Subjective: Pt reports having less pain in since last session.       Objective: See treatment diary below      Assessment: Tolerated treatment well. Patient came in with symptoms going down the leg. Patients symptoms centralized when laying prone on elbows. Patient was able to progress to performing full press ups without any pain in her back. Cued patient to not bend forward and educated patient on how to correctly get off of table. Patient experienced increase in symptoms down the leg with standing back extension. Trialed hip sags at wall and this increased symptoms down patients left leg. After having patient go back to laying prone symptoms resolved after 7 minutes. Educated patient not to bend forward throughout her day. feeling better prone. Patient demonstrated fatigue post treatment and would benefit from continued PT      Plan: Continue per plan of care.      POC expires Unit limit Auth Expiration date PT/OT + Visit Limit?   Auth pending 3/4/24 4 pend N/a                           Visit/Unit Tracking  AUTH Status:  Date 1/9 1/15 1/18            Auth pend Used 1 2 3             Remaining                        Precautions: N/A      Manuals  1/15 1/18          Lumbar gapping mobs gr V  Pt agreed RAYMUNDO FONSECA          CPA's  RAYMUNDO FONSECA                                    Neuro Re-Ed  1/15 1/18          90/90 nerve glides    2 x10                                                                                         Ther Ex             Prone press ups HEP 3x10 3 x 10           NEERU HEP 2x5' 2 x 5'    7'          Standing lumbar ext HEP 3x10 P!          Open books on left    10\" x 10           Paloff press    RTB 2 x 10           Wall sag    10x p!                                  "   Ther Activity                                       Gait Training                                       Modalities

## 2024-01-19 ENCOUNTER — APPOINTMENT (OUTPATIENT)
Dept: PHYSICAL THERAPY | Age: 64
End: 2024-01-19
Payer: COMMERCIAL

## 2024-01-22 ENCOUNTER — PROCEDURE VISIT (OUTPATIENT)
Dept: FAMILY MEDICINE CLINIC | Facility: CLINIC | Age: 64
End: 2024-01-22
Payer: COMMERCIAL

## 2024-01-22 VITALS
OXYGEN SATURATION: 94 % | HEIGHT: 67 IN | TEMPERATURE: 97.4 F | SYSTOLIC BLOOD PRESSURE: 122 MMHG | BODY MASS INDEX: 27.15 KG/M2 | DIASTOLIC BLOOD PRESSURE: 74 MMHG | WEIGHT: 173 LBS | HEART RATE: 89 BPM

## 2024-01-22 DIAGNOSIS — Z23 ENCOUNTER FOR IMMUNIZATION: ICD-10-CM

## 2024-01-22 DIAGNOSIS — Z11.51 SCREENING FOR HPV (HUMAN PAPILLOMAVIRUS): ICD-10-CM

## 2024-01-22 DIAGNOSIS — Z01.419 ROUTINE GYNECOLOGICAL EXAMINATION: Primary | ICD-10-CM

## 2024-01-22 DIAGNOSIS — M54.16 LUMBAR RADICULOPATHY: ICD-10-CM

## 2024-01-22 PROCEDURE — 99396 PREV VISIT EST AGE 40-64: CPT | Performed by: FAMILY MEDICINE

## 2024-01-22 PROCEDURE — 90750 HZV VACC RECOMBINANT IM: CPT | Performed by: FAMILY MEDICINE

## 2024-01-22 PROCEDURE — G0476 HPV COMBO ASSAY CA SCREEN: HCPCS | Performed by: FAMILY MEDICINE

## 2024-01-22 PROCEDURE — 90471 IMMUNIZATION ADMIN: CPT | Performed by: FAMILY MEDICINE

## 2024-01-22 PROCEDURE — G0145 SCR C/V CYTO,THINLAYER,RESCR: HCPCS | Performed by: FAMILY MEDICINE

## 2024-01-22 RX ORDER — CYCLOBENZAPRINE HCL 10 MG
10 TABLET ORAL
Qty: 30 TABLET | Refills: 0 | Status: SHIPPED | OUTPATIENT
Start: 2024-01-22

## 2024-01-23 ENCOUNTER — OFFICE VISIT (OUTPATIENT)
Dept: PHYSICAL THERAPY | Age: 64
End: 2024-01-23
Payer: COMMERCIAL

## 2024-01-23 DIAGNOSIS — M54.16 LUMBAR RADICULOPATHY: Primary | ICD-10-CM

## 2024-01-23 LAB
HPV HR 12 DNA CVX QL NAA+PROBE: NEGATIVE
HPV16 DNA CVX QL NAA+PROBE: NEGATIVE
HPV18 DNA CVX QL NAA+PROBE: NEGATIVE

## 2024-01-23 PROCEDURE — 97112 NEUROMUSCULAR REEDUCATION: CPT | Performed by: PHYSICAL THERAPIST

## 2024-01-23 PROCEDURE — 97140 MANUAL THERAPY 1/> REGIONS: CPT | Performed by: PHYSICAL THERAPIST

## 2024-01-23 PROCEDURE — 97110 THERAPEUTIC EXERCISES: CPT | Performed by: PHYSICAL THERAPIST

## 2024-01-23 NOTE — PROGRESS NOTES
"Daily Note     Today's date: 2024  Patient name: Gloria Ramirez  : 1960  MRN: [id number]  Referring provider: Renita Kay*  Dx:   Encounter Diagnosis     ICD-10-CM    1. Lumbar radiculopathy  M54.16           Start Time: 1615  Stop Time: 1700  Total time in clinic (min): 45 minutes    Subjective: Pt reports difficulty sleeping but slight improvements during the day.       Objective: See treatment diary below      Assessment: Tolerated treatment well. Pt's POC was progressed to include more hip flexor stretching.  Patient demonstrated fatigue post treatment and would benefit from continued PT      Plan: Continue per plan of care.      POC expires Unit limit Auth Expiration date PT/OT + Visit Limit?   Auth pending 3/4/24 4 pend N/a                           Visit/Unit Tracking  AUTH Status:  Date 1/9 1/15 1/18 1/23           Auth pend - good till 24 Used 1 2 3 4            Remaining                        Precautions: N/A      Manuals  1/15 1/24          Lumbar gapping mobs gr III  Pt agreed JF JF          CPA's  JF JF          SL hip flexor stretch   JF                       Neuro Re-Ed  1/15 1/24          90/90 nerve glides    2 x10                                                                                         Ther Ex             Prone press ups HEP 3x10 3 x 10           NEERU HEP 2x5' 2 x 10'    7'          Standing lumbar ext HEP 3x10 P!          Open books on left    10\" x 10           Paloff press    RTB 2 x 10           Kneeling hip flexor stretch   15x10\"                                    Ther Activity                                       Gait Training                                       Modalities                                              "

## 2024-01-24 NOTE — PROGRESS NOTES
Patient ID: Gloria Ramirez is a 63 y.o. female.    HPI: 63 y.o.female presents for a gyn exam.  She denies any problems with bowel or bladder.  She complains of lumbar back pain chronic in nature which occasionally radiates down her left leg and would like a refill of muscle relaxants for this.  The patient would like to get Zostavax vaccine today.    SUBJECTIVE    Family History   Problem Relation Age of Onset    Lung cancer Mother     Lung cancer Father     Cancer Father         Urinary Bladder    Thyroid disease Daughter     Heart disease Maternal Grandmother     Heart disease Maternal Grandfather     Heart disease Paternal Grandmother     No Known Problems Paternal Grandfather     No Known Problems Son     Cancer Maternal Aunt         unsure of type    Lung cancer Maternal Aunt     Cancer Paternal Aunt         unsure of type     Social History     Socioeconomic History    Marital status: /Civil Union     Spouse name: Not on file    Number of children: Not on file    Years of education: Not on file    Highest education level: Not on file   Occupational History    Not on file   Tobacco Use    Smoking status: Former     Passive exposure: Past    Smokeless tobacco: Never   Vaping Use    Vaping status: Never Used   Substance and Sexual Activity    Alcohol use: Yes     Comment: rare    Drug use: Never    Sexual activity: Not on file   Other Topics Concern    Not on file   Social History Narrative    Coffee     Social Determinants of Health     Financial Resource Strain: Not on file   Food Insecurity: Not on file   Transportation Needs: Not on file   Physical Activity: Not on file   Stress: Not on file   Social Connections: Not on file   Intimate Partner Violence: Not on file   Housing Stability: Not on file     Past Medical History:   Diagnosis Date    Congenital hip dysplasia     Seasonal allergies      Past Surgical History:   Procedure Laterality Date    HIP SURGERY      OVARIAN CYST REMOVAL      TUBAL  LIGATION       No Known Allergies    Current Outpatient Medications:     azelastine (ASTELIN) 0.1 % nasal spray, 2 sprays into each nostril 2 (two) times a day Use in each nostril as directed, Disp: 30 mL, Rfl: 3    cholecalciferol (VITAMIN D3) 1,000 units tablet, Take 1,000 Units by mouth daily, Disp: , Rfl:     Cyanocobalamin (VITAMIN B 12 PO), Take by mouth, Disp: , Rfl:     cyclobenzaprine (FLEXERIL) 10 mg tablet, Take 1 tablet (10 mg total) by mouth daily at bedtime, Disp: 30 tablet, Rfl: 0    Misc Natural Products (Elderberry/Vitamin C/Zinc) CHEW, Chew, Disp: , Rfl:     multivitamin (THERAGRAN) TABS, Take 1 tablet by mouth daily, Disp: , Rfl:     Omega-3 1000 MG CAPS, Take by mouth, Disp: , Rfl:     rosuvastatin (CRESTOR) 10 MG tablet, Take 1 tablet (10 mg total) by mouth daily, Disp: 90 tablet, Rfl: 1    TURMERIC PO, Take by mouth, Disp: , Rfl:     Review of Systems  Constitutional:     Denies fever, chills, fatigue, weakness, weight loss, weight gain     Pulmonary: Denies shortness of breath,cough, dyspnea on exertion, orthopnea,  PND   Cardiovascular:  Denies bradycardia, tachycardia, palpations, lower extremity edema leg, claudication  Abdomen:  Denies abdominal pain, anorexia, indigestion, nausea, vomiting, constipation, diarrhea  ;Denies dysuria, urinary incontinence or polyuria  GYN: no breast pain or new or enlarging lumps on self exam, no vaginal bleeding  Skin: Denies skin rash, skin lesion, skin wound, itching, dry skin  Musculature: Positive lumbar back pain with intermittent radiation down left leg  Psychiatric: Denies feelings of depression, anxiety, sleep disturbances    OBJECTIVE    Constitutional:   NAD, well appearing and well nourished      Pulmonary:Respiratory effort normal rate and rhythm, no increased work of breathing. Auscultation of lungs:  Clear bilaterally with no adventitious breath sounds       Cardiovascular: regular rate and rhythm, S1 and S2, no murmur, no edema and/or  varicosities of LE      Abdomen: Soft and non-distended     Positive bowel sounds      No heptomegaly or splenomegaly      Gu: No suprapubic tenderness, CVA tenderenss or urethral discharge noted  Musculature: Positive increased paravertebral muscle tension of lumbar musculature left greater than right  Psych:   alert and oriented to person, place and time     normal mood and affect   Gynecologic:       Breast:            Breasts: breasts appear normal, no suspicious masses, no skin or nipple changes or axillary nodes.       Pelvic:             exam chaperoned by nurse, EGBUS within normal limits, normal vagina and vulva       Rectal:             negative without mass, lesions or tenderness, sphincter tone normal, stool guaiac negative    Assessment/Plan:Diagnoses and all orders for this visit:    Routine gynecological examination    Lumbar radiculopathy  Comments:  Refill given on flexeril and pt to ice low back bid for 20 min intervals.  Orders:  -     cyclobenzaprine (FLEXERIL) 10 mg tablet; Take 1 tablet (10 mg total) by mouth daily at bedtime    Screening for HPV (human papillomavirus)  -     Liquid-based pap, screening; Future  -     Liquid-based pap, screening  -     HPV High Risk    Encounter for immunization  -     Zoster Vaccine Recombinant IM        Reviewed with patient plan to treat with above plan.    Patient instructed to call for problems or concerns in the interim

## 2024-01-26 ENCOUNTER — OFFICE VISIT (OUTPATIENT)
Dept: PHYSICAL THERAPY | Age: 64
End: 2024-01-26
Payer: COMMERCIAL

## 2024-01-26 DIAGNOSIS — M54.16 LUMBAR RADICULOPATHY: Primary | ICD-10-CM

## 2024-01-26 PROCEDURE — 97112 NEUROMUSCULAR REEDUCATION: CPT

## 2024-01-26 PROCEDURE — 97110 THERAPEUTIC EXERCISES: CPT

## 2024-01-26 NOTE — PROGRESS NOTES
"Daily Note     Today's date: 2024  Patient name: Gloria Ramirez  : 1960  MRN: [id number]  Referring provider: Renita Kay*  Dx:   Encounter Diagnosis     ICD-10-CM    1. Lumbar radiculopathy  M54.16                      Subjective: LBP pain is a little better. LE pain is still bothersome. She has been performing HEP before bed, with good results.       Objective: See treatment diary below      Assessment: Tolerated treatment well. Positive response to lumbar extension tasks, reducing LBP. Continued tightness in L hip flexor. Continued PT would be beneficial to improve function.          Plan: Continue per plan of care.       POC expires Unit limit Auth Expiration date PT/OT + Visit Limit?   Auth pending 3/4/24 4 pend N/a                           Visit/Unit Tracking  AUTH Status:  Date 1/9 1/15 1/18 1/23 1/26          Auth pend - good till 24 Used 1 2 3 4 5           Remaining                        Precautions: N/A      Manuals  1/15 1/24 1/26         Lumbar gapping mobs gr III  Pt agreed JF JF          CPA's  JF JF          SL hip flexor stretch   JF MB                      Neuro Re-Ed  1/15 1/24 1/26         90/90 nerve glides    2 x10  2x10                                                                                       Ther Ex             Prone press ups HEP 3x10 3 x 10  3x10         NEERU HEP 2x5' 2 x 10'    7' 2x10'         Standing lumbar ext HEP 3x10 P!          Open books on left    10\" x 10  10x10\"         Paloff press    RTB 2 x 10  RTB 2 x 10          Kneeling hip flexor stretch   15x10\" 15x10\"                                   Ther Activity                                       Gait Training                                       Modalities                                                "

## 2024-01-29 LAB
LAB AP GYN PRIMARY INTERPRETATION: NORMAL
Lab: NORMAL

## 2024-01-30 ENCOUNTER — OFFICE VISIT (OUTPATIENT)
Dept: PHYSICAL THERAPY | Age: 64
End: 2024-01-30
Payer: COMMERCIAL

## 2024-01-30 DIAGNOSIS — M54.16 LUMBAR RADICULOPATHY: Primary | ICD-10-CM

## 2024-01-30 PROCEDURE — 97140 MANUAL THERAPY 1/> REGIONS: CPT | Performed by: PHYSICAL THERAPIST

## 2024-01-30 PROCEDURE — 97112 NEUROMUSCULAR REEDUCATION: CPT | Performed by: PHYSICAL THERAPIST

## 2024-01-30 PROCEDURE — 97110 THERAPEUTIC EXERCISES: CPT | Performed by: PHYSICAL THERAPIST

## 2024-01-31 NOTE — PROGRESS NOTES
"Daily Note     Today's date: 2024  Patient name: Gloria Ramirez  : 1960  MRN: [id number]  Referring provider: Renita Kay*  Dx:   Encounter Diagnosis     ICD-10-CM    1. Lumbar radiculopathy  M54.16           Start Time: 1530  Stop Time: 1615  Total time in clinic (min): 45 minutes    Subjective: Pt reports decreased low back pain but does have increased hip pain.       Objective: See treatment diary below      Assessment: Tolerated treatment well. POC progressed to include more hip strengthening. Patient demonstrated fatigue post treatment and would benefit from continued PT      Plan: Continue per plan of care.      Visit/Unit Tracking  AUTH Status:  Date 1/9 1/15 1/18 1/23 1/26 1/30         Auth pend - good till 24 Used 1 2 3 4 5 6          Remaining                        Precautions: N/A      Manuals  1/15 1/24 1/26 1/30        Lumbar gapping mobs gr III  Pt agreed JF JF          CPA's  JF JF  JF + PPU with OP        SL hip flexor stretch   JF MB         LAD     JF p!        Neuro Re-Ed  1/15 1/24 1/26         90/90 nerve glides    2 x10  2x10         Mini squats     3x10        SLR     3x10 LLE        Bridges     3x10                                               Ther Ex             Prone press ups HEP 3x10 3 x 10  3x10 3x10        NEERU HEP 2x5' 2 x 10'    7' 2x10' 5'        Standing lumbar ext HEP 3x10 P!  2x10        Open books on left    10\" x 10  10x10\"         Paloff press    RTB 2 x 10  RTB 2 x 10          Kneeling hip flexor stretch   15x10\" 15x10\"                                   Ther Activity                                       Gait Training                                       Modalities                                                  "

## 2024-02-02 ENCOUNTER — OFFICE VISIT (OUTPATIENT)
Dept: PHYSICAL THERAPY | Age: 64
End: 2024-02-02
Payer: COMMERCIAL

## 2024-02-02 DIAGNOSIS — M54.16 LUMBAR RADICULOPATHY: Primary | ICD-10-CM

## 2024-02-02 PROCEDURE — 97110 THERAPEUTIC EXERCISES: CPT | Performed by: PHYSICAL THERAPIST

## 2024-02-02 PROCEDURE — 97112 NEUROMUSCULAR REEDUCATION: CPT | Performed by: PHYSICAL THERAPIST

## 2024-02-02 PROCEDURE — 97140 MANUAL THERAPY 1/> REGIONS: CPT | Performed by: PHYSICAL THERAPIST

## 2024-02-02 NOTE — PROGRESS NOTES
PT Re-Evaluation     Today's date: 2024  Patient name: Gloria Ramirez  : 1960  MRN: [id number]  Referring provider: Renita Kay*  Dx:   Encounter Diagnosis     ICD-10-CM    1. Lumbar radiculopathy  M54.16 Ambulatory Referral to Physical Therapy          Start Time: 1415  Stop Time: 1500  Total time in clinic (min): 45 minutes    Assessment  Assessment details: Pt is a 64 y/o female who presents with right hip and low back pain. No further referral is necessary at this time. Patient's symptoms are likely stemming from a combination of decreased mobility in both hip and lumbar spine and has a positive prognosis with PT. Pt has a movement impairment diagnosis of lumbar and hip hypomobility representing a pathoantomical diagnosis of lumbar radiculopathy. Pt has seen improvements and has nearly no LBP at this time. Symptoms appear to be stemming from GTB bursitis radiating down into thigh similar to piriformis syndrome. Patient also started reporting locking in knee that is being address. Pt is experiencing pain, decreased strength, and decreased ROM. Pt has a positive prognosis. Pt would benefit from PT to address these impairments leading to increased functional capacity and improved quality of life.    Impairments: abnormal or restricted ROM, impaired physical strength, lacks appropriate home exercise program, pain with function, poor posture  and poor body mechanics  Understanding of Dx/Px/POC: good   Prognosis: good    Goals  Short Term Goals: to be achieved by 4 weeks  1) Patient to be independent with basic HEP.  2) Decrease pain to 2/10 at its worst.  3) Increase lumbar spine ROM to minimal deficiencies in all deficient planes.   4) Increase LE strength by 1/2 MMT grade in all deficient planes.    Long Term Goals: to be achieved by discharge  1) FOTO equal to or greater than 65.  2) Patient to be independent with comprehensive HEP.  3) Lumbar spine ROM WNL all planes to improve  a/iadls.  4) Increase LE strength to 5/5 MMT grade in all planes to improve a/iadls.  5) Patient to report no sleep interruption secondary to pain.  6) Increase ambulatory tolerance to 40 min.     Plan  Patient would benefit from: skilled physical therapy  Planned modality interventions: cryotherapy and thermotherapy: hydrocollator packs  Planned therapy interventions: neuromuscular re-education, patient education, stretching, strengthening, therapeutic activities, therapeutic exercise, therapeutic training, home exercise program and graded activity  Frequency: Twice a week for 8 weeks.  Plan of Care expiration date: 3/5/2024  Treatment plan discussed with: patient      Subjective Evaluation    History of Present Illness  Mechanism of injury: Pt started having pain in left hip 2 months ago. No x rays but patient has been able to walk but has pain radiating from back to anterior thigh. Patient has history of orthopedic hip pain treated with injections. Patient's PCP ordered MRI and patient was told she has to do PT first.   Quality of life: good    Patient Goals  Patient goals for therapy: decreased pain, independence with ADLs/IADLs, return to sport/leisure activities, increased strength and increased motion    Pain  Current pain ratin  At best pain ratin  At worst pain ratin  Quality: radiating, dull ache, pressure, tight and throbbing  Aggravating factors: stair climbing, sitting, standing and lifting    Hand dominance: right          Objective     Concurrent Complaints  Negative for night pain, disturbed sleep, bladder dysfunction, bowel dysfunction, saddle (S4) numbness, cardiac problem, kidney problem, gallbladder problem, stomach problem, ulcer, appendix problem, spleen problem, pancreas problem, history of cancer, history of trauma and infection    Neurological Testing     Sensation     Lumbar   Left   Intact: light touch    Right   Intact: light touch    Reflexes   Left   Patellar (L4): normal  "(2+)  Achilles (S1): normal (2+)  Clonus sign: negative    Right   Patellar (L4): normal (2+)  Achilles (S1): normal (2+)  Clonus sign: negative    Active Range of Motion     Lumbar   Flexion:  Restriction level: minimal  Extension:  Restriction level: minimal  Left lateral flexion:  WFL  Right lateral flexion:  WFL  Left rotation:  WFL  Right rotation:  WFL    Additional Active Range of Motion Details  Increased pain with     Joint Play     Hypomobile: L3, L4 and L5 IMPROVED ON RE-ASSESSMENT      Tests     Lumbar     Right   Negative passive SLR and slump test.     Right Hip   Negative SHLOMO and FADIR. TTP with to GTB and piriformis.     Additional Tests Details  Patient has decreased mobility in hip flexor musculature.     MMT R HIP:  4/5 in all planes of motion               Visit/Unit Tracking  AUTH Status:  Date 2/2              Auth pend Used 7               Remaining                        Precautions: N/A      Manuals  1/15 1/24 1/26 2/2        Lumbar gapping mobs gr III  Pt agreed JF JF          CPA's  JF JF  JF + PPU with OP        SL hip flexor stretch   JF MB         LAD     JF p!        Neuro Re-Ed  1/15 1/24 1/26         90/90 nerve glides    2 x10  2x10         Mini squats     3x10        SLR     3x10 LLE        Bridges     3x10                                               Ther Ex             Prone press ups HEP 3x10 3 x 10  3x10 3x10        NEERU HEP 2x5' 2 x 10'    7' 2x10' 5'        Standing lumbar ext HEP 3x10 P!  2x10        Open books on left    10\" x 10  10x10\"         Paloff press    RTB 2 x 10  RTB 2 x 10          Kneeling hip flexor stretch   15x10\" 15x10\"                                   Ther Activity                                       Gait Training                                       Modalities                                                      "

## 2024-02-05 ENCOUNTER — TELEPHONE (OUTPATIENT)
Age: 64
End: 2024-02-05

## 2024-02-05 NOTE — TELEPHONE ENCOUNTER
Spoke with patient, patient had stated she will need a work note for 12/12 12/13,12/14 and 12/15. Please advise.

## 2024-02-05 NOTE — TELEPHONE ENCOUNTER
Reviewed chart to clarify reason for work excuse request. Patient saw Jana Lemus at our office on 12/12 and was diagnosed with covid. Note is to cover absence due to covid. Entered in patient's chart.

## 2024-02-06 ENCOUNTER — OFFICE VISIT (OUTPATIENT)
Dept: PHYSICAL THERAPY | Age: 64
End: 2024-02-06
Payer: COMMERCIAL

## 2024-02-06 ENCOUNTER — TELEPHONE (OUTPATIENT)
Age: 64
End: 2024-02-06

## 2024-02-06 DIAGNOSIS — M25.562 CHRONIC PAIN OF LEFT KNEE: Primary | ICD-10-CM

## 2024-02-06 DIAGNOSIS — G89.29 CHRONIC PAIN OF LEFT KNEE: Primary | ICD-10-CM

## 2024-02-06 DIAGNOSIS — M54.16 LUMBAR RADICULOPATHY: Primary | ICD-10-CM

## 2024-02-06 PROCEDURE — 97112 NEUROMUSCULAR REEDUCATION: CPT | Performed by: PHYSICAL THERAPIST

## 2024-02-06 PROCEDURE — 97140 MANUAL THERAPY 1/> REGIONS: CPT | Performed by: PHYSICAL THERAPIST

## 2024-02-06 PROCEDURE — 97110 THERAPEUTIC EXERCISES: CPT | Performed by: PHYSICAL THERAPIST

## 2024-02-06 NOTE — TELEPHONE ENCOUNTER
Patient's physical therapist called stating he has been treating this patient for lower back pain for the past month. He states the back pain has now completely resolved. However, the pain is now more localized in the hip area. So, he is suggesting the patient would greatly benefit for a referral for ortho. Please have Dr Monk place an order for this patient.

## 2024-02-06 NOTE — TELEPHONE ENCOUNTER
Patient states pain is in left hip. She has been having pain in this area. As back is getting better pain is getting worse in hip and knee.

## 2024-02-07 NOTE — PROGRESS NOTES
"Daily Note     Today's date: 2024  Patient name: Gloria Ramirez  : 1960  MRN: [id number]  Referring provider: Renita Kay*  Dx:   Encounter Diagnosis     ICD-10-CM    1. Lumbar radiculopathy  M54.16           Start Time: 1745  Stop Time: 1830  Total time in clinic (min): 45 minutes    Subjective: Pt reports improvements in symptoms and will be getting x ray of hip and knee tomorrow.       Objective: See treatment diary below      Assessment: Tolerated treatment well. Pt's POC progressed to include more quad strengthening interventions. Patient demonstrated fatigue post treatment and would benefit from continued PT      Plan: Continue per plan of care.      Visit/Unit Tracking  AUTH Status:  Date 1/9 1/15 1/18 1/23 1/26 1/30 2/6        Auth pend - 12 visits 24 - 24 Used 1 2 3 4 5 6 7         Remaining        5                Precautions: N/A      Manuals  1/15 1/24 1/26 2/6        Lumbar gapping mobs gr III  Pt agreed JF JF          Hip PROM     JF        SL hip flexor stretch   JF MB         Caudal hip glides     JF        Neuro Re-Ed  1/15 1/24 1/26         90/90 nerve glides    2 x10  2x10         Mini squats             SLR             Bridges             Wall sits     3x30\"        Eccentric heel tap step     3x10                     Ther Ex             Prone press ups HEP 3x10 3 x 10  3x10         NEERU HEP 2x5' 2 x 10'    7' 2x10'         Standing lumbar ext HEP 3x10 P!  2x10        Open books on left    10\" x 10  10x10\"         Paloff press    RTB 2 x 10  RTB 2 x 10          Kneeling hip flexor stretch   15x10\" 15x10\" Off bed 10x10\"        Lunge on step knee flexion     20x5\"                     Ther Activity                                       Gait Training                                       Modalities                                                    "

## 2024-02-08 ENCOUNTER — APPOINTMENT (OUTPATIENT)
Dept: RADIOLOGY | Age: 64
End: 2024-02-08
Payer: COMMERCIAL

## 2024-02-08 DIAGNOSIS — G89.29 CHRONIC PAIN OF LEFT KNEE: ICD-10-CM

## 2024-02-08 DIAGNOSIS — M25.562 CHRONIC PAIN OF LEFT KNEE: ICD-10-CM

## 2024-02-08 PROCEDURE — 73562 X-RAY EXAM OF KNEE 3: CPT

## 2024-02-08 PROCEDURE — 73502 X-RAY EXAM HIP UNI 2-3 VIEWS: CPT

## 2024-02-09 ENCOUNTER — OFFICE VISIT (OUTPATIENT)
Dept: PHYSICAL THERAPY | Age: 64
End: 2024-02-09
Payer: COMMERCIAL

## 2024-02-09 DIAGNOSIS — M54.16 LUMBAR RADICULOPATHY: Primary | ICD-10-CM

## 2024-02-09 PROCEDURE — 97112 NEUROMUSCULAR REEDUCATION: CPT | Performed by: PHYSICAL THERAPIST

## 2024-02-09 PROCEDURE — 97140 MANUAL THERAPY 1/> REGIONS: CPT | Performed by: PHYSICAL THERAPIST

## 2024-02-09 PROCEDURE — 97110 THERAPEUTIC EXERCISES: CPT | Performed by: PHYSICAL THERAPIST

## 2024-02-12 NOTE — PROGRESS NOTES
"Daily Note     Today's date: 2024  Patient name: Gloria Ramirez  : 1960  MRN: [id number]  Referring provider: Renita Kay*  Dx:   Encounter Diagnosis     ICD-10-CM    1. Lumbar radiculopathy  M54.16           Start Time: 1345  Stop Time: 1430  Total time in clinic (min): 45 minutes    Subjective: Pt reports no new symptoms.       Objective: See treatment diary below      Assessment: Tolerated treatment well. Patient demonstrated fatigue post treatment and would benefit from continued PT      Plan: Continue per plan of care.      Visit/Unit Tracking  AUTH Status:  Date 1/9 1/15 1/18 1/23 1/26 1/30 2       Auth pend - 12 visits 24 - 24 Used 1 2 3 4 5 6 7 8        Remaining        5 4               Precautions: N/A      Manuals  1/15 1/24 1/26 2/12        Lumbar gapping mobs gr III  Pt agreed JF JF          Hip PROM     JF        SL hip flexor stretch   JF MB         Caudal hip glides     JF        Neuro Re-Ed  1/15 1/24 1/26         90/90 nerve glides    2 x10  2x10         Mini squats             SLR             Bridges             Wall sits     3x30\"        Eccentric heel tap step     3x10                     Ther Ex             Prone press ups HEP 3x10 3 x 10  3x10         NEERU HEP 2x5' 2 x 10'    7' 2x10'         Standing lumbar ext HEP 3x10 P!  2x10        Open books on left    10\" x 10  10x10\"         Paloff press    RTB 2 x 10  RTB 2 x 10          Kneeling hip flexor stretch   15x10\" 15x10\" Off bed 10x10\"        Lunge on step knee flexion     20x5\"                     Ther Activity                                       Gait Training                                       Modalities                                                      "

## 2024-02-13 ENCOUNTER — OFFICE VISIT (OUTPATIENT)
Dept: PHYSICAL THERAPY | Age: 64
End: 2024-02-13
Payer: COMMERCIAL

## 2024-02-13 DIAGNOSIS — M54.16 LUMBAR RADICULOPATHY: Primary | ICD-10-CM

## 2024-02-13 PROCEDURE — 97112 NEUROMUSCULAR REEDUCATION: CPT | Performed by: PHYSICAL THERAPIST

## 2024-02-13 PROCEDURE — 97140 MANUAL THERAPY 1/> REGIONS: CPT | Performed by: PHYSICAL THERAPIST

## 2024-02-13 PROCEDURE — 97110 THERAPEUTIC EXERCISES: CPT | Performed by: PHYSICAL THERAPIST

## 2024-02-13 NOTE — PROGRESS NOTES
"Daily Note     Today's date: 2024  Patient name: Gloria Ramirez  : 1960  MRN: [id number]  Referring provider: Renita Kay*  Dx:   Encounter Diagnosis     ICD-10-CM    1. Lumbar radiculopathy  M54.16           Start Time: 1445  Stop Time: 1530  Total time in clinic (min): 45 minutes    Subjective: Pt reports improvements in both hip and knee pain.       Objective: See treatment diary below      Assessment: Tolerated treatment well. Pt's POC was progressed to include more hip strengthening interventions in closed chain positions. Patient demonstrated fatigue post treatment and would benefit from continued PT      Plan: Continue per plan of care.      Visit/Unit Tracking  AUTH Status:  Date 1/9 1/15 1/18 1/23 1/26 1/30 2/6 2/9 2/13      Auth pend - 12 visits 24 - 24 Used 2 3 4 5 6 7 8 9 10       Remaining          2              Precautions: N/A      Manuals  1/15 1/24 1/26 2/13        Lumbar gapping mobs gr III  Pt agreed JF JF          Hip PROM     JF        SL hip flexor stretch   JF MB         Caudal hip glides     JF        Neuro Re-Ed  1/15 1/24 1/26         90/90 nerve glides    2 x10  2x10         Mini squats     3x10        SLR     2x10 b/l        Bridges     3x10        Wall sits     3x30\"        Eccentric heel tap step     3x10        Leg press      3x10 105#        Ther Ex             Prone press ups HEP 3x10 3 x 10  3x10         NEERU HEP 2x5' 2 x 10'    7' 2x10'         Standing lumbar ext HEP 3x10 P!  2x10        Open books on left    10\" x 10  10x10\"         Paloff press    RTB 2 x 10  RTB 2 x 10          Kneeling hip flexor stretch   15x10\" 15x10\" Off bed 10x10\"        Lunge on step knee flexion     20x5\"                     Ther Activity                                       Gait Training                                       Modalities                                                        "

## 2024-02-16 ENCOUNTER — OFFICE VISIT (OUTPATIENT)
Dept: PHYSICAL THERAPY | Age: 64
End: 2024-02-16
Payer: COMMERCIAL

## 2024-02-16 DIAGNOSIS — M54.16 LUMBAR RADICULOPATHY: Primary | ICD-10-CM

## 2024-02-16 PROCEDURE — 97110 THERAPEUTIC EXERCISES: CPT | Performed by: PHYSICAL THERAPIST

## 2024-02-16 PROCEDURE — 97140 MANUAL THERAPY 1/> REGIONS: CPT | Performed by: PHYSICAL THERAPIST

## 2024-02-16 PROCEDURE — 97112 NEUROMUSCULAR REEDUCATION: CPT | Performed by: PHYSICAL THERAPIST

## 2024-02-16 NOTE — PROGRESS NOTES
"Daily Note     Today's date: 2024  Patient name: Gloria Ramirez  : 1960  MRN: [id number]  Referring provider: Renita Kay*  Dx:   Encounter Diagnosis     ICD-10-CM    1. Lumbar radiculopathy  M54.16           Start Time: 1430  Stop Time: 1515  Total time in clinic (min): 45 minutes    Subjective: Pt reports no new symptoms.       Objective: See treatment diary below      Assessment: Tolerated treatment well. Patient demonstrated fatigue post treatment      Plan: Continue per plan of care.  Progress note during next visit.      Visit/Unit Tracking  AUTH Status:  Date 1/9 1/15 1/18 1/23 1/26 1/30 2/6 2/9 2/13 2.16     Auth pend - 12 visits 24 - 24 Used 2 3 4 5 6 7 8 9 10       Remaining          2              Precautions: N/A      Manuals  1/15 1/24 1/26 2/13 2/16       Lumbar gapping mobs gr III  Pt agreed JF JF          Hip PROM     JF JF       SL hip flexor stretch   JF MB         Caudal hip glides     JF JF       Neuro Re-Ed  1/15 1/24 1/26         90/90 nerve glides    2 x10  2x10         Mini squats     3x10 3x10       SLR     2x10 b/l 2x10 b/l       Bridges     3x10 3x10       Wall sits     3x30\"        Eccentric heel tap step     3x10 3x10       Lateral walks       3 laps gtb       Leg press      3x10 105# 3x10 105#       Ther Ex             Prone press ups HEP 3x10 3 x 10  3x10         NEERU HEP 2x5' 2 x 10'    7' 2x10'         Standing lumbar ext HEP 3x10 P!  2x10        Open books on left    10\" x 10  10x10\"         Paloff press    RTB 2 x 10  RTB 2 x 10          Kneeling hip flexor stretch   15x10\" 15x10\" Off bed 10x10\"        Lunge on step knee flexion     20x5\"                     Ther Activity                                       Gait Training                                       Modalities                                                          "

## 2024-02-20 ENCOUNTER — EVALUATION (OUTPATIENT)
Dept: PHYSICAL THERAPY | Age: 64
End: 2024-02-20
Payer: COMMERCIAL

## 2024-02-20 DIAGNOSIS — M25.552 LEFT HIP PAIN: ICD-10-CM

## 2024-02-20 DIAGNOSIS — M54.16 LUMBAR RADICULOPATHY: Primary | ICD-10-CM

## 2024-02-20 DIAGNOSIS — M25.562 LEFT KNEE PAIN, UNSPECIFIED CHRONICITY: ICD-10-CM

## 2024-02-20 PROCEDURE — 97140 MANUAL THERAPY 1/> REGIONS: CPT | Performed by: PHYSICAL THERAPIST

## 2024-02-20 PROCEDURE — 97110 THERAPEUTIC EXERCISES: CPT | Performed by: PHYSICAL THERAPIST

## 2024-02-20 NOTE — PROGRESS NOTES
PT Re-Evaluation     Today's date: 2024  Patient name: Gloria Ramirez  : 1960  MRN: [id number]  Referring provider: Renita Kay*  Dx:   Encounter Diagnosis     ICD-10-CM    1. Lumbar radiculopathy  M54.16 Ambulatory Referral to Physical Therapy          Start Time: 1745  Stop Time: 1830  Total time in clinic (min): 45 minutes    Assessment  Assessment details: Pt is a 64 y/o female who presents with left hip and low back pain. No further referral is necessary at this time. Patient's symptoms started following fall Patient's symptoms have continued to wax and wean over time. Overall patient is feeling improvements with frequency of pain but still has instances where she has pain down the entire left leg. SHLOMO is the most pain provoking special test with groin pain. Patient also has relief with LAD of LLE. Patient also has a positive passive SLR test (ankle DF increases symptoms in hip and posterior thigh). Patient reports tightness with slump but no pain. At this time patient would continue to benefit from skilled PT but would benefit from consult with orthopedics to rule out hip pathology vs. lumbar radiculopathy. Patient also reports clicking and locking in left knee.  Pt is experiencing pain, decreased strength, and decreased ROM. Pt has a positive prognosis. Pt would benefit from PT to address these impairments leading to increased functional capacity and improved quality of life.    Impairments: abnormal or restricted ROM, impaired physical strength, lacks appropriate home exercise program, pain with function, poor posture  and poor body mechanics  Understanding of Dx/Px/POC: good   Prognosis: good    Goals   Short Term Goals: to be achieved by 4 weeks - MET  1) Patient to be independent with basic HEP.  2) Decrease pain to 2/10 at its worst.  3) Increase lumbar spine ROM to minimal deficiencies in all deficient planes.   4) Increase LE strength by 1/2 MMT grade in all deficient  planes.    Long Term Goals: to be achieved by discharge - Partially met   1) FOTO equal to or greater than 65.  2) Patient to be independent with comprehensive HEP.  3) Lumbar spine ROM WNL all planes to improve a/iadls.  4) Increase LE strength to 5/5 MMT grade in all planes to improve a/iadls.  5) Patient to report no sleep interruption secondary to pain.  6) Increase ambulatory tolerance to 40 min.     Plan  Patient would benefit from: skilled physical therapy  Planned modality interventions: cryotherapy and thermotherapy: hydrocollator packs  Planned therapy interventions: neuromuscular re-education, patient education, stretching, strengthening, therapeutic activities, therapeutic exercise, therapeutic training, home exercise program and graded activity  Frequency: Twice a week for 6 weeks.        Subjective Evaluation    History of Present Illness  Mechanism of injury: Pt started having pain in left hip 2 months ago. No x rays but patient has been able to walk but has pain radiating from back to anterior thigh. Patient has history of orthopedic hip pain treated with injections. Patient's PCP ordered MRI and patient was told she has to do PT first.   Quality of life: good    Patient Goals  Patient goals for therapy: decreased pain, independence with ADLs/IADLs, return to sport/leisure activities, increased strength and increased motion    Pain  Current pain ratin  At best pain ratin  At worst pain ratin  Quality: radiating, dull ache, pressure, tight and throbbing  Aggravating factors: stair climbing, sitting, standing and lifting    Hand dominance: right          Objective     Concurrent Complaints  Negative for night pain, disturbed sleep, bladder dysfunction, bowel dysfunction, saddle (S4) numbness, cardiac problem, kidney problem, gallbladder problem, stomach problem, ulcer, appendix problem, spleen problem, pancreas problem, history of cancer, history of trauma and infection    Neurological  "Testing     Sensation     Lumbar   Left   Intact: light touch    Right   Intact: light touch    Reflexes   Left   Patellar (L4): normal (2+)  Achilles (S1): normal (2+)  Clonus sign: negative    Right   Patellar (L4): normal (2+)  Achilles (S1): normal (2+)  Clonus sign: negative    Active Range of Motion     Lumbar   Flexion:  Restriction level: moderate  Extension:  Restriction level: minimal  Left lateral flexion:  WFL  Right lateral flexion:  WFL  Left rotation:  WFL  Right rotation:  WFL    Additional Active Range of Motion Details  Increased pain with     Joint Play     Hypomobile: L5     NO PAIN WITH SPRING TESTING IN BACK    Tests     Lumbar     Left   Positive  passive SLR   Negative slump test.     Left Hip   Positive SHLOMO and FADIR.     Additional Tests Details  Patient has decreased mobility in hip flexor musculature.           Visit/Unit Tracking  AUTH Status:  Date 1/9 1/15 1/18 1/23 1/26 1/30 2/6 2/9 2/13 2.16     Auth pend - 12 visits 1/9/24 - 4/8/24 Used 2 3 4 5 6 7 8 9 10 11      Remaining          2 RE-eval sending for auth               Precautions: N/A          Manuals  1/15 1/24 1/26 2/13 2/20       Lumbar gapping mobs gr III  Pt agreed JF JF          Hip PROM     JF JF       SL hip flexor stretch   JF MB         Caudal hip glides     JF JF       Neuro Re-Ed  1/15 1/24 1/26         90/90 nerve glides    2 x10  2x10         Mini squats     3x10        SLR     2x10 b/l        Bridges     3x10        Wall sits     3x30\"        Eccentric heel tap step     3x10        Lateral walks              Leg press      3x10 105#        Ther Ex             Prone press ups HEP 3x10 3 x 10  3x10  3x10       NEERU HEP 2x5' 2 x 10'    7' 2x10'  5'       Standing lumbar ext HEP 3x10 P!  2x10        Open books on left    10\" x 10  10x10\"         Paloff press    RTB 2 x 10  RTB 2 x 10          Kneeling hip flexor stretch   15x10\" 15x10\" Off bed 10x10\"        Lunge on step knee flexion     20x5\"        PT re-assessment "      JF       Ther Activity                                       Gait Training                                       Modalities

## 2024-02-21 PROBLEM — J01.01 ACUTE RECURRENT MAXILLARY SINUSITIS: Status: RESOLVED | Noted: 2018-01-31 | Resolved: 2024-02-21

## 2024-02-22 ENCOUNTER — OFFICE VISIT (OUTPATIENT)
Dept: OBGYN CLINIC | Facility: CLINIC | Age: 64
End: 2024-02-22
Payer: COMMERCIAL

## 2024-02-22 VITALS
WEIGHT: 173 LBS | HEART RATE: 96 BPM | DIASTOLIC BLOOD PRESSURE: 77 MMHG | HEIGHT: 67 IN | BODY MASS INDEX: 27.15 KG/M2 | SYSTOLIC BLOOD PRESSURE: 111 MMHG

## 2024-02-22 DIAGNOSIS — M70.62 GREATER TROCHANTERIC BURSITIS OF LEFT HIP: ICD-10-CM

## 2024-02-22 DIAGNOSIS — M25.552 PAIN IN LEFT HIP: Primary | ICD-10-CM

## 2024-02-22 DIAGNOSIS — M16.12 PRIMARY OSTEOARTHRITIS OF LEFT HIP: ICD-10-CM

## 2024-02-22 PROCEDURE — 99203 OFFICE O/P NEW LOW 30 MIN: CPT | Performed by: ORTHOPAEDIC SURGERY

## 2024-02-22 PROCEDURE — 20610 DRAIN/INJ JOINT/BURSA W/O US: CPT | Performed by: ORTHOPAEDIC SURGERY

## 2024-02-22 RX ORDER — LIDOCAINE HYDROCHLORIDE 10 MG/ML
1 INJECTION, SOLUTION INFILTRATION; PERINEURAL
Status: COMPLETED | OUTPATIENT
Start: 2024-02-22 | End: 2024-02-22

## 2024-02-22 RX ORDER — BETAMETHASONE SODIUM PHOSPHATE AND BETAMETHASONE ACETATE 3; 3 MG/ML; MG/ML
12 INJECTION, SUSPENSION INTRA-ARTICULAR; INTRALESIONAL; INTRAMUSCULAR; SOFT TISSUE
Status: COMPLETED | OUTPATIENT
Start: 2024-02-22 | End: 2024-02-22

## 2024-02-22 RX ORDER — BUPIVACAINE HYDROCHLORIDE 2.5 MG/ML
1 INJECTION, SOLUTION EPIDURAL; INFILTRATION; INTRACAUDAL
Status: COMPLETED | OUTPATIENT
Start: 2024-02-22 | End: 2024-02-22

## 2024-02-22 RX ADMIN — LIDOCAINE HYDROCHLORIDE 1 ML: 10 INJECTION, SOLUTION INFILTRATION; PERINEURAL at 14:30

## 2024-02-22 RX ADMIN — BUPIVACAINE HYDROCHLORIDE 1 ML: 2.5 INJECTION, SOLUTION EPIDURAL; INFILTRATION; INTRACAUDAL at 14:30

## 2024-02-22 RX ADMIN — BETAMETHASONE SODIUM PHOSPHATE AND BETAMETHASONE ACETATE 12 MG: 3; 3 INJECTION, SUSPENSION INTRA-ARTICULAR; INTRALESIONAL; INTRAMUSCULAR; SOFT TISSUE at 14:30

## 2024-02-22 NOTE — PROGRESS NOTES
Assessment:  1. Pain in left hip  Ambulatory Referral to Orthopedic Surgery      2. Greater trochanteric bursitis of left hip        3. Primary osteoarthritis of left hip          Patient Active Problem List   Diagnosis    DDD (degenerative disc disease), lumbar    Left sided sciatica    Lipid disorder    Retinal defect    Goiter           Plan      63 y.o. female with left hip greater trochanteric bursitis. Patient also has history of femoral dysplasia.   Diagnosis and treatment options reviewed, all her questions were addressed  Patient is already attending physical therapy, recommend continue   A left hip greater trochanteric bursa CSI was administered in clinic today. Procedure tolerated well, post injection protocol advised.   We did discuss that ultimately if patient needs to have a total hip arthroplasty she will need to follow up with one of our joint specialists.             Subjective:     Patient ID:    Chief Complaint:Gloria Ramirez 63 y.o. female      HPI    Patient present for initial evaluation of left hip. History of SCFE as a child and subsequent surgery. Patient also notes a history of left hip bursitis and states this feels similar. She notes a fall in November which has increased her pain. Pain is in the lateral hip, she denies any groin pain. Pain is an intermittent ache aggravated with activity and laying on her side. Denies numbness or paresthesias.       The following portions of the patient's history were reviewed and updated as appropriate: allergies, current medications, past family history, past social history, past surgical history and problem list.        Social History     Socioeconomic History    Marital status: /Civil Union     Spouse name: Not on file    Number of children: Not on file    Years of education: Not on file    Highest education level: Not on file   Occupational History    Not on file   Tobacco Use    Smoking status: Former     Passive exposure: Past     Smokeless tobacco: Never   Vaping Use    Vaping status: Never Used   Substance and Sexual Activity    Alcohol use: Yes     Comment: rare    Drug use: Never    Sexual activity: Not on file   Other Topics Concern    Not on file   Social History Narrative    Coffee     Social Determinants of Health     Financial Resource Strain: Not on file   Food Insecurity: Not on file   Transportation Needs: Not on file   Physical Activity: Not on file   Stress: Not on file   Social Connections: Not on file   Intimate Partner Violence: Not on file   Housing Stability: Not on file     Past Medical History:   Diagnosis Date    Congenital hip dysplasia     Seasonal allergies      Past Surgical History:   Procedure Laterality Date    HIP SURGERY      OVARIAN CYST REMOVAL      TUBAL LIGATION       No Known Allergies  Current Outpatient Medications on File Prior to Visit   Medication Sig Dispense Refill    azelastine (ASTELIN) 0.1 % nasal spray 2 sprays into each nostril 2 (two) times a day Use in each nostril as directed 30 mL 3    cholecalciferol (VITAMIN D3) 1,000 units tablet Take 1,000 Units by mouth daily      Cyanocobalamin (VITAMIN B 12 PO) Take by mouth      cyclobenzaprine (FLEXERIL) 10 mg tablet Take 1 tablet (10 mg total) by mouth daily at bedtime 30 tablet 0    Misc Natural Products (Elderberry/Vitamin C/Zinc) CHEW Chew      multivitamin (THERAGRAN) TABS Take 1 tablet by mouth daily      Omega-3 1000 MG CAPS Take by mouth      rosuvastatin (CRESTOR) 10 MG tablet Take 1 tablet (10 mg total) by mouth daily 90 tablet 1    TURMERIC PO Take by mouth       No current facility-administered medications on file prior to visit.              Objective:    Review of Systems   Constitutional:  Negative for chills and fever.   HENT:  Negative for ear pain and sore throat.    Eyes:  Negative for pain and visual disturbance.   Respiratory:  Negative for cough and shortness of breath.    Cardiovascular:  Negative for chest pain and  palpitations.   Gastrointestinal:  Negative for abdominal pain and vomiting.   Genitourinary:  Negative for dysuria and hematuria.   Musculoskeletal:  Negative for arthralgias and back pain.   Skin:  Negative for color change and rash.   Neurological:  Negative for seizures and syncope.   All other systems reviewed and are negative.      Left Hip Exam     Tenderness   The patient is experiencing tenderness in the greater trochanter.    Muscle Strength   Flexion: 5/5     Other   Erythema: absent  Sensation: normal  Pulse: present    Comments:  Mildly positive Obers   TTP piriformis   Patient presents with no anatomical deformity  Ambulates with steady gait pattern  Knee flexor and extensor mechanism intact  2+ TP and DP pulses with brisk capillary refill to the toes  Sural, saphenous, tibial, superficial and deep peroneal motor and sensory distribution intact  Sensation to light touch               Physical Exam  Vitals and nursing note reviewed.   HENT:      Head: Normocephalic.   Eyes:      Extraocular Movements: Extraocular movements intact.   Cardiovascular:      Rate and Rhythm: Normal rate.      Pulses: Normal pulses.   Pulmonary:      Effort: Pulmonary effort is normal.   Musculoskeletal:         General: Normal range of motion.      Cervical back: Normal range of motion.   Skin:     General: Skin is warm and dry.   Neurological:      General: No focal deficit present.      Mental Status: She is alert.   Psychiatric:         Behavior: Behavior normal.         Large joint arthrocentesis: L greater trochanteric bursa  Universal Protocol:  Consent: Verbal consent obtained.  Risks and benefits: risks, benefits and alternatives were discussed  Consent given by: patient  Timeout called at: 2/22/2024 3:16 PM.  Patient understanding: patient states understanding of the procedure being performed  Patient identity confirmed: verbally with patient  Supporting Documentation  Indications: pain and diagnostic evaluation  "  Procedure Details  Location: hip - L greater trochanteric bursa  Needle size: 22 G  Ultrasound guidance: no  Medications administered: 1 mL bupivacaine (PF) 0.25 %; 1 mL lidocaine 1 %; 12 mg betamethasone acetate-betamethasone sodium phosphate 6 (3-3) mg/mL    Patient tolerance: patient tolerated the procedure well with no immediate complications  Dressing:  Sterile dressing applied             I have personally reviewed pertinent films in PACS.  Left hip x-ray obtained 2/6/2024 demonstrates left femoral dysplasia and osteoarthritis.       Scribe Attestation      I,:  America Benitez am acting as a scribe while in the presence of the attending physician.:       I,:  Bereket Hamilton, DO personally performed the services described in this documentation    as scribed in my presence.:                 Portions of the record may have been created with voice recognition software.  Occasional wrong word or \"sound a like\" substitutions may have occurred due to the inherent limitations of voice recognition software.  Read the chart carefully and recognize, using context, where substitutions have occurred.    "

## 2024-02-23 ENCOUNTER — OFFICE VISIT (OUTPATIENT)
Dept: PHYSICAL THERAPY | Age: 64
End: 2024-02-23
Payer: COMMERCIAL

## 2024-02-23 DIAGNOSIS — M25.552 LEFT HIP PAIN: ICD-10-CM

## 2024-02-23 DIAGNOSIS — M54.16 LUMBAR RADICULOPATHY: Primary | ICD-10-CM

## 2024-02-23 DIAGNOSIS — M25.562 LEFT KNEE PAIN, UNSPECIFIED CHRONICITY: ICD-10-CM

## 2024-02-23 PROCEDURE — 97110 THERAPEUTIC EXERCISES: CPT

## 2024-02-23 PROCEDURE — 97140 MANUAL THERAPY 1/> REGIONS: CPT

## 2024-02-23 NOTE — PROGRESS NOTES
"Daily Note     Today's date: 2024  Patient name: Gloria Ramirez  : 1960  MRN: [id number]  Referring provider: Renita Kay*  Dx:   Encounter Diagnosis     ICD-10-CM    1. Lumbar radiculopathy  M54.16       2. Left knee pain, unspecified chronicity  M25.562       3. Left hip pain  M25.552                      Subjective: Pt reports that she is doing well today feeling much better since her injection. Notes that around this time in the afternoon she feels as if her leg is dragging but not today.       Objective: See treatment diary below      Assessment: Tolerated treatment well beginning on the bike as an active exercise. Continued to favor an extension based approach, focusing on LE strengthening. Patient demonstrated fatigue post treatment, exhibited good technique with therapeutic exercises, and would benefit from continued PT      Plan: Continue per plan of care.      Visit/Unit Tracking  AUTH Status:  Date 1/9 1/15 1/18 1/23 1/26 1/30 2/ 2.16    Auth pend - 12 visits 24 - 24 Used 2 3 4 5 6 7 8 9 10 11 12 13    Remaining          2  RE-eval sending for auth 11             Precautions: N/A      Manuals  1/15 1/24 1/26 2/13 2/20 2/23      Lumbar gapping mobs gr III  Pt agreed JF JF          Hip PROM     JF JF MM      SL hip flexor stretch   JF MB         Caudal hip glides     JF JF JF      Neuro Re-Ed  1/15 1/24 1/26         90/90 nerve glides    2 x10  2x10         Mini squats     3x10 -- 3x10      SLR     2x10 b/l -- 2x10 b/l      Bridges     3x10  3x10      Wall sits     3x30\"        Eccentric heel tap step     3x10        Lateral walks              Leg press      3x10 105#        Ther Ex             Prone press ups HEP 3x10 3 x 10  3x10  3x10 3x10      NEERU HEP 2x5' 2 x 10'    7' 2x10'  5' 5'      Standing lumbar ext HEP 3x10 P!  2x10        Open books on left    10\" x 10  10x10\"         Paloff press    RTB 2 x 10  RTB 2 x 10          Kneeling hip flexor " "stretch   15x10\" 15x10\" Off bed 10x10\"        Lunge on step knee flexion     20x5\"        PT re-assessment      JF       Standing hip Abd       2x10 ea      Ther Activity                                       Gait Training                                       Modalities                                               "

## 2024-02-27 ENCOUNTER — OFFICE VISIT (OUTPATIENT)
Dept: PHYSICAL THERAPY | Age: 64
End: 2024-02-27
Payer: COMMERCIAL

## 2024-02-27 DIAGNOSIS — M25.552 LEFT HIP PAIN: ICD-10-CM

## 2024-02-27 DIAGNOSIS — M54.16 LUMBAR RADICULOPATHY: Primary | ICD-10-CM

## 2024-02-27 DIAGNOSIS — M25.562 LEFT KNEE PAIN, UNSPECIFIED CHRONICITY: ICD-10-CM

## 2024-02-27 PROCEDURE — 97140 MANUAL THERAPY 1/> REGIONS: CPT

## 2024-02-27 PROCEDURE — 97110 THERAPEUTIC EXERCISES: CPT

## 2024-02-27 NOTE — PROGRESS NOTES
"Daily Note     Today's date: 2024  Patient name: Gloria Ramirez  : 1960  MRN: [id number]  Referring provider: Renita Kay*  Dx:   Encounter Diagnosis     ICD-10-CM    1. Lumbar radiculopathy  M54.16       2. Left knee pain, unspecified chronicity  M25.562       3. Left hip pain  M25.552                      Subjective: Pt reports that the injection really seemed to help help feeling at if she got her strength and range of motion back. Notes that she sees the back MD tomorrow.       Objective: See treatment diary below      Assessment: Tolerated treatment well beginning on the bike as an active exercise. Progressed pts strengthening as charted below today with muscular fatigue noted towards end of session but no complaints of pain reported.  Patient demonstrated fatigue post treatment, exhibited good technique with therapeutic exercises, and would benefit from continued PT      Plan: Continue per plan of care.      Visit/Unit Tracking  AUTH Status:  Date  2 2.   Auth pend - 12 visits 24 - 24 Used 14     7 8 9 10 11 12 13    Remaining  10        2  RE-eval sending for auth 11       Precautions: N/A      Manuals  1/15 1/24 1/26 2/13 2/20 2/23 2/27     Lumbar gapping mobs gr III  Pt agreed JF JF          Hip PROM     JF JF MM MM     SL hip flexor stretch   JF MB         Caudal hip glides     JF JF JF      Neuro Re-Ed  1/15 1/24 1/26         90/90 nerve glides    2 x10  2x10         Mini squats     3x10 -- 3x10 3x10     SLR     2x10 b/l -- 2x10 b/l 2# 2x10 b/l  + hip abd     Bridges     3x10  3x10 3x10     Wall sits     3x30\"        Eccentric heel tap step     3x10        Lateral walks              Leg press      3x10 105#   3x10 105#     Ther Ex             Prone press ups HEP 3x10 3 x 10  3x10  3x10 3x10 3x10     NEERU HEP 2x5' 2 x 10'    7' 2x10'  5' 5' 5'      Standing lumbar ext HEP 3x10 P!  2x10        Open books on left    10\" x 10  10x10\"       " "  Paloff press    RTB 2 x 10  RTB 2 x 10     Dbl grn 15x ea     Kneeling hip flexor stretch   15x10\" 15x10\" Off bed 10x10\"        Lunge on step knee flexion     20x5\"        PT re-assessment      JF       Standing hip Abd       2x10 ea 2# 2x10     Ther Activity                                       Gait Training                                       Modalities                                               "

## 2024-02-28 ENCOUNTER — OFFICE VISIT (OUTPATIENT)
Dept: OBGYN CLINIC | Facility: HOSPITAL | Age: 64
End: 2024-02-28
Payer: COMMERCIAL

## 2024-02-28 ENCOUNTER — HOSPITAL ENCOUNTER (OUTPATIENT)
Dept: RADIOLOGY | Facility: HOSPITAL | Age: 64
Discharge: HOME | End: 2024-02-28
Attending: ORTHOPAEDIC SURGERY
Payer: COMMERCIAL

## 2024-02-28 VITALS
HEIGHT: 67 IN | HEART RATE: 78 BPM | BODY MASS INDEX: 27.16 KG/M2 | DIASTOLIC BLOOD PRESSURE: 76 MMHG | WEIGHT: 173.06 LBS | SYSTOLIC BLOOD PRESSURE: 126 MMHG

## 2024-02-28 DIAGNOSIS — R52 PAIN: ICD-10-CM

## 2024-02-28 DIAGNOSIS — R52 PAIN: Primary | ICD-10-CM

## 2024-02-28 PROCEDURE — 99213 OFFICE O/P EST LOW 20 MIN: CPT | Performed by: ORTHOPAEDIC SURGERY

## 2024-02-28 PROCEDURE — 72110 X-RAY EXAM L-2 SPINE 4/>VWS: CPT

## 2024-02-28 NOTE — PROGRESS NOTES
Assessment & Plan/Medical Decision Makin y.o. female with Back Pain and Left Radicular Leg Pain and imaging findings most notable for lumbar spondylosis         The clinical, physical and imaging findings were reviewed with the patient.  Gloria  has a constellation of findings consistent with Lumbar Myofascial Pain and Lumbar Facet/Arthrosis Pain in the setting of lumbar degenerative disease. Reports onset of back and left lower extremity pain after fall in 2023. Pain has since resolved after physical therapy and left greater troch injection.  Fortunately patient remains neurologically intact and functional. Physical exam showing no weakness.  We discussed the treatment options including physical therapy, at home exercises, activity modifications, chiropractic medicine, oral medications, interventional spine procedures.  At this time recommend continued conservative treatments.  Continue to maintain at home exercises and stretches. Continue to ambulate as much as tolerated.  Continue with medications as previously prescribed if providing pain/symptom relief.  Patient instructed to return to office/ER sooner if symptoms are not improving, getting worse, or new worrisome/neurologic symptoms arise.  Patient will follow up as needed.    Subjective:      Chief Complaint: Back Pain    HPI:  Gloria Ramirez is a 63 y.o. female presenting for initial visit with chief complaint of back pain. Onset of back pain with radiation into left gluteal region and lateral left lower extremity to level of her knee after fall in 2023. Pain worse upon waking up in the morning. Pain improved throughout the day but does report some back and leg fatigue towards the end of the work day. She attended physical therapy with significant improvement in her back pain and range of motion, however her left leg symptoms persisted after physical therapy.  She recently underwent left greater troch injection on 2024 with  Dr. Hamilton with almost complete resolution of her left lower extremity symptoms. Currently denies any pain. Denies weakness. Denies numbness/tingling. Denies right sided symptoms. Denies any [place] trauma. Denies fever or chills, no night sweats. Denies any bladder or bowel changes.      Denies heart of lung disease. Denies diabetes or kidney disease.    Conservative therapy includes the following:   Medications: flexeril, naproxen as needed     Injections: denies     Physical Therapy: has attempted with improvement in back pain, leg symptoms persisted  Chiropractic Medicine: has not attempted  Accupunture/Massage Therapy: has not attempted   These therapeutic modalities were ineffective at providing sustained pain relief/functional improvement.     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  Living situation: Lives with family   ADLs: patient is able to perform     Objective:     Family History   Problem Relation Age of Onset    Lung cancer Mother     Lung cancer Father     Cancer Father         Urinary Bladder    Thyroid disease Daughter     Heart disease Maternal Grandmother     Heart disease Maternal Grandfather     Heart disease Paternal Grandmother     No Known Problems Paternal Grandfather     No Known Problems Son     Cancer Maternal Aunt         unsure of type    Lung cancer Maternal Aunt     Cancer Paternal Aunt         unsure of type       Past Medical History:   Diagnosis Date    Congenital hip dysplasia     Seasonal allergies        Current Outpatient Medications   Medication Sig Dispense Refill    azelastine (ASTELIN) 0.1 % nasal spray 2 sprays into each nostril 2 (two) times a day Use in each nostril as directed 30 mL 3    cholecalciferol (VITAMIN D3) 1,000 units tablet Take 1,000 Units by mouth daily      Cyanocobalamin (VITAMIN B 12 PO) Take by mouth      Misc Natural Products (Elderberry/Vitamin C/Zinc) CHEW Chew      multivitamin  (THERAGRAN) TABS Take 1 tablet by mouth daily      Omega-3 1000 MG CAPS Take by mouth      rosuvastatin (CRESTOR) 10 MG tablet Take 1 tablet (10 mg total) by mouth daily 90 tablet 1    TURMERIC PO Take by mouth      cyclobenzaprine (FLEXERIL) 10 mg tablet Take 1 tablet (10 mg total) by mouth daily at bedtime (Patient not taking: Reported on 2/28/2024) 30 tablet 0     No current facility-administered medications for this visit.       Past Surgical History:   Procedure Laterality Date    HIP SURGERY      OVARIAN CYST REMOVAL      TUBAL LIGATION         Social History     Socioeconomic History    Marital status: /Civil Union     Spouse name: Not on file    Number of children: Not on file    Years of education: Not on file    Highest education level: Not on file   Occupational History    Not on file   Tobacco Use    Smoking status: Former     Passive exposure: Past    Smokeless tobacco: Never   Vaping Use    Vaping status: Never Used   Substance and Sexual Activity    Alcohol use: Yes     Comment: rare    Drug use: Never    Sexual activity: Not on file   Other Topics Concern    Not on file   Social History Narrative    Coffee     Social Determinants of Health     Financial Resource Strain: Not on file   Food Insecurity: Not on file   Transportation Needs: Not on file   Physical Activity: Not on file   Stress: Not on file   Social Connections: Not on file   Intimate Partner Violence: Not on file   Housing Stability: Not on file       No Known Allergies    Review of Systems  General- denies fever/chills  HEENT- denies hearing loss or sore throat  Eyes- denies eye pain or visual disturbances, denies red eyes  Respiratory- denies cough or SOB  Cardio- denies chest pain or palpitations  GI- denies abdominal pain  Endocrine- denies urinary frequency  Urinary- denies pain with urination  Musculoskeletal- Negative except noted above  Skin- denies rashes or wounds  Neurological- denies dizziness or headache  Psychiatric-  "denies anxiety or difficulty concentrating    Physical Exam  /76   Pulse 78   Ht 5' 7\" (1.702 m)   Wt 78.5 kg (173 lb 1 oz)   BMI 27.11 kg/m²     General/Constitutional: No apparent distress: well-nourished and well developed.  Lymphatic: No appreciable lymphadenopathy  Respiratory: Non-labored breathing  Vascular: No edema, swelling or tenderness, except as noted in detailed exam.  Integumentary: No impressive skin lesions present, except as noted in detailed exam.  Psych: Normal mood and affect, oriented to person, place and time.  MSK: normal other than stated in HPI and exam  Gait & balance: no evidence of myelopathic gait, ambulates Independently     Lumbar spine range of motion:  -Forward flexion to 90  -Extension to neutral  -Lateral bend 25 right, 25 left  -Rotation 25 right, 25 left  There tenderness with palpation along lumbar paraspinal musculature, no midline tenderness     Neurologic:    Lower Extremity Motor Function    Right  Left    Iliopsoas  5/5  5/5    Quadriceps 5/5 5/5   Tibialis anterior  5/5  5/5    EHL  5/5  5/5    Gastroc. muscle  5/5  5/5    Heel rise  5/5  5/5    Toe rise  5/5  5/5      Sensory: light touch is intact to bilateral upper and lower extremities     Reflexes:    Right Left   Patellar 1+ 1+   Achilles 1+ 1+   Babinski neg neg     Other tests:  Straight Leg Raise: negative  Swathi SI: negative  SHLOMO SI: negative  Greater troch: no tenderness   Internal/external hip ROM: intact, no pain   Flexion/extension knee ROM: intact, no pain   Vascular: WWP extremities, 2+DP bilateral      Diagnostic Tests   IMAGING: I have personally reviewed the images and these are my findings:  Lumbar Spine X-rays from 2/28/2024: lumbar spondylosis with loss of disc height, osteophyte formation and facet hypertrophy, no apparent spondylolisthesis, no appreciated lytic/blastic lesions, no obvious instability    Electronic Medical Records were reviewed including orthopedic office notes, PT " "notes, imaging studies.    Procedures, if performed today     None performed       Portions of the record may have been created with voice recognition software.  Occasional wrong word or \"sound a like\" substitutions may have occurred due to the inherent limitations of voice recognition software.  Read the chart carefully and recognize, using context, where substitutions have occurred.  "

## 2024-03-01 ENCOUNTER — OFFICE VISIT (OUTPATIENT)
Dept: PHYSICAL THERAPY | Age: 64
End: 2024-03-01
Payer: COMMERCIAL

## 2024-03-01 DIAGNOSIS — M54.16 LUMBAR RADICULOPATHY: Primary | ICD-10-CM

## 2024-03-01 DIAGNOSIS — M25.552 LEFT HIP PAIN: ICD-10-CM

## 2024-03-01 DIAGNOSIS — M25.562 LEFT KNEE PAIN, UNSPECIFIED CHRONICITY: ICD-10-CM

## 2024-03-01 PROCEDURE — 97110 THERAPEUTIC EXERCISES: CPT

## 2024-03-01 PROCEDURE — 97140 MANUAL THERAPY 1/> REGIONS: CPT

## 2024-03-01 NOTE — PROGRESS NOTES
"Daily Note     Today's date: 3/1/2024  Patient name: Gloria Ramirez  : 1960  MRN: [id number]  Referring provider: Renita Kay*  Dx:   Encounter Diagnosis     ICD-10-CM    1. Lumbar radiculopathy  M54.16       2. Left knee pain, unspecified chronicity  M25.562       3. Left hip pain  M25.552                      Subjective: Pt reports that she will do PT next week yet and well call it there. Notes that she has some arthritis but no injuries. Notes that she woke up a little stiff this morning but overall is dong well. Her MD requested that she keeps working on core stability exercises at home once D/C'd from therapy.       Objective: See treatment diary below      Assessment: Tolerated treatment well beginning on the bike as an active exercise. Increased tightness was present in her LLE hamstrings today. Pt felt looser to end session today happy with her progress. Patient demonstrated fatigue post treatment, exhibited good technique with therapeutic exercises, and would benefit from continued PT      Plan: Continue per plan of care.      Visit/Unit Tracking  AUTH Status:  Date 2/27 3/1    1/30 2 2.16    Auth pend - 12 visits 24 - 24 Used 14 15    7 8 9 10 11 12 13    Remaining  10 9       2  RE-eval sending for auth 11       Precautions: N/A      Manuals  1/15 1/24 1/26 2/13 2/20 2/23 2/27 3/1    Lumbar gapping mobs gr III  Pt agreed JF JF          Hip PROM     JF JF MM MM MM    SL hip flexor stretch   JF MB         Caudal hip glides     JF JF JF      Neuro Re-Ed  1/15 1/24 1/26         90/90 nerve glides    2 x10  2x10         Mini squats     3x10 -- 3x10 3x10 3x10    SLR     2x10 b/l -- 2x10 b/l 2# 2x10 b/l  + hip abd 2# 2x10 b/l  + hip abd    Bridges     3x10  3x10 3x10 3x10    Wall sits     3x30\"        Eccentric heel tap step     3x10        Lateral walks              Leg press      3x10 105#   3x10 105# 3x10 105#    Standing Pball TrA press         5\"x20    Ther " "Ex             Prone press ups HEP 3x10 3 x 10  3x10  3x10 3x10 3x10 3x10    NEERU HEP 2x5' 2 x 10'    7' 2x10'  5' 5' 5'  5'    Standing lumbar ext HEP 3x10 P!  2x10        Open books on left    10\" x 10  10x10\"         Paloff press    RTB 2 x 10  RTB 2 x 10     Dbl grn 15x ea Dbl grn 2x10    Kneeling hip flexor stretch   15x10\" 15x10\" Off bed 10x10\"        Lunge on step knee flexion     20x5\"        PT re-assessment      JF       Standing hip Abd       2x10 ea 2# 2x10 2# 2x10    Ther Activity                                       Gait Training                                       Modalities                                               "

## 2024-03-04 ENCOUNTER — APPOINTMENT (OUTPATIENT)
Dept: PHYSICAL THERAPY | Age: 64
End: 2024-03-04
Payer: COMMERCIAL

## 2024-03-08 ENCOUNTER — OFFICE VISIT (OUTPATIENT)
Dept: PHYSICAL THERAPY | Age: 64
End: 2024-03-08
Payer: COMMERCIAL

## 2024-03-08 DIAGNOSIS — M54.16 LUMBAR RADICULOPATHY: Primary | ICD-10-CM

## 2024-03-08 DIAGNOSIS — M25.562 LEFT KNEE PAIN, UNSPECIFIED CHRONICITY: ICD-10-CM

## 2024-03-08 DIAGNOSIS — M25.552 LEFT HIP PAIN: ICD-10-CM

## 2024-03-08 PROCEDURE — 97140 MANUAL THERAPY 1/> REGIONS: CPT | Performed by: PHYSICAL THERAPIST

## 2024-03-08 PROCEDURE — 97112 NEUROMUSCULAR REEDUCATION: CPT | Performed by: PHYSICAL THERAPIST

## 2024-03-08 PROCEDURE — 97110 THERAPEUTIC EXERCISES: CPT | Performed by: PHYSICAL THERAPIST

## 2024-03-08 NOTE — PROGRESS NOTES
Discharge Summary    Today's date: 3/8/2024  Patient name: Gloria Ramirez  : 1960  MRN: [id number]  Referring provider: Renita Kay*  Dx:   Encounter Diagnosis     ICD-10-CM    1. Lumbar radiculopathy  M54.16 Ambulatory Referral to Physical Therapy          Start Time: 1430  Stop Time: 1510  Total time in clinic (min): 40 minutes    Assessment  Assessment details: Pt is a 64 y/o female who presents with right hip and low back pain. No further referral is necessary at this time. Patient has made 100% improvements and will be discharged with HEP.     Impairments: abnormal or restricted ROM, impaired physical strength, lacks appropriate home exercise program, pain with function, poor posture  and poor body mechanics  Understanding of Dx/Px/POC: good   Prognosis: good    Goals - ALL MET   Short Term Goals: to be achieved by 4 weeks  1) Patient to be independent with basic HEP.  2) Decrease pain to 2/10 at its worst.  3) Increase lumbar spine ROM to minimal deficiencies in all deficient planes.   4) Increase LE strength by 1/2 MMT grade in all deficient planes.    Long Term Goals: to be achieved by discharge  1) FOTO equal to or greater than 65.  2) Patient to be independent with comprehensive HEP.  3) Lumbar spine ROM WNL all planes to improve a/iadls.  4) Increase LE strength to 5/5 MMT grade in all planes to improve a/iadls.  5) Patient to report no sleep interruption secondary to pain.  6) Increase ambulatory tolerance to 40 min.     Plan  Patient would benefit from: D/C PT      Precautions: N/A      Manuals  1/15 1/24 1/26 2/13 2/20 2/23 2/27 3/8    Lumbar gapping mobs gr III  Pt agreed JF JF          Hip PROM     JF JF MM MM JF    SL hip flexor stretch   JF MB         Caudal hip glides     JF JF JF      Neuro Re-Ed  1/15 1/24 1/26         90/90 nerve glides    2 x10  2x10         Mini squats     3x10 -- 3x10 3x10 3x10    SLR     2x10 b/l -- 2x10 b/l 2# 2x10 b/l  + hip abd 2# 2x10 b/l  + hip abd  "   Bridges     3x10  3x10 3x10 3x10    Wall sits     3x30\"        Eccentric heel tap step     3x10        Lateral walks              Leg press      3x10 105#   3x10 105# 3x10 105#    Standing Pball TrA press         5\"x20    Ther Ex             Prone press ups HEP 3x10 3 x 10  3x10  3x10 3x10 3x10 3x10    NEERU HEP 2x5' 2 x 10'    7' 2x10'  5' 5' 5'  5'    Standing lumbar ext HEP 3x10 P!  2x10        Open books on left    10\" x 10  10x10\"         Paloff press    RTB 2 x 10  RTB 2 x 10     Dbl grn 15x ea Dbl grn 2x10    Kneeling hip flexor stretch   15x10\" 15x10\" Off bed 10x10\"        Lunge on step knee flexion     20x5\"        PT re-assessment      JF       Standing hip Abd       2x10 ea 2# 2x10 2# 2x10    Ther Activity                                       Gait Training                                       Modalities                                           "

## 2024-04-15 ENCOUNTER — OFFICE VISIT (OUTPATIENT)
Dept: FAMILY MEDICINE CLINIC | Facility: CLINIC | Age: 64
End: 2024-04-15
Payer: COMMERCIAL

## 2024-04-15 VITALS
TEMPERATURE: 97.5 F | HEIGHT: 67 IN | SYSTOLIC BLOOD PRESSURE: 108 MMHG | OXYGEN SATURATION: 96 % | BODY MASS INDEX: 27.47 KG/M2 | DIASTOLIC BLOOD PRESSURE: 70 MMHG | WEIGHT: 175 LBS | HEART RATE: 83 BPM

## 2024-04-15 DIAGNOSIS — R68.89 FLU-LIKE SYMPTOMS: Primary | ICD-10-CM

## 2024-04-15 LAB
SARS-COV-2 AG UPPER RESP QL IA: NEGATIVE
SL AMB POCT RAPID FLU A: NEGATIVE
SL AMB POCT RAPID FLU B: NEGATIVE
VALID CONTROL: NORMAL

## 2024-04-15 PROCEDURE — 99213 OFFICE O/P EST LOW 20 MIN: CPT | Performed by: FAMILY MEDICINE

## 2024-04-15 PROCEDURE — 87811 SARS-COV-2 COVID19 W/OPTIC: CPT | Performed by: FAMILY MEDICINE

## 2024-04-15 PROCEDURE — 87804 INFLUENZA ASSAY W/OPTIC: CPT | Performed by: FAMILY MEDICINE

## 2024-04-15 RX ORDER — AZITHROMYCIN 250 MG/1
TABLET, FILM COATED ORAL DAILY
Qty: 6 TABLET | Refills: 0 | Status: SHIPPED | OUTPATIENT
Start: 2024-04-15 | End: 2024-04-20

## 2024-04-15 RX ORDER — ALBUTEROL SULFATE 90 UG/1
2 AEROSOL, METERED RESPIRATORY (INHALATION) EVERY 4 HOURS PRN
Qty: 18 G | Refills: 0 | Status: SHIPPED | OUTPATIENT
Start: 2024-04-15

## 2024-04-15 NOTE — PROGRESS NOTES
Name: Gloria Ramirez      : 1960      MRN: [id number]  Encounter Provider: Peter Meadows MD  Encounter Date: 4/15/2024   Encounter department: [facility]    Assessment & Plan     1. Flu-like symptoms  -     POCT Rapid Covid Ag  -     POCT rapid flu A and B  -     azithromycin (Zithromax) 250 mg tablet; Take 2 tablets (500 mg total) by mouth daily for 1 day, THEN 1 tablet (250 mg total) daily for 4 days.  -     albuterol (Ventolin HFA) 90 mcg/act inhaler; Inhale 2 puffs every 4 (four) hours as needed for wheezing    Counseled the patient regarding supportive care.  They are to call or return to the office if not improving.         Subjective      URI   This is a new problem. The current episode started in the past 7 days. The problem has been gradually worsening. There has been no fever. Associated symptoms include congestion and coughing. Pertinent negatives include no abdominal pain, chest pain, diarrhea, dysuria, headaches, nausea, rash, rhinorrhea, sore throat, vomiting or wheezing.         Review of Systems   Constitutional:  Negative for activity change, chills and fever.   HENT:  Positive for congestion. Negative for rhinorrhea and sore throat.    Eyes:  Negative for visual disturbance.   Respiratory:  Positive for cough. Negative for shortness of breath and wheezing.    Cardiovascular:  Negative for chest pain and palpitations.   Gastrointestinal:  Negative for abdominal pain, blood in stool, constipation, diarrhea, nausea and vomiting.   Genitourinary:  Negative for dysuria.   Musculoskeletal:  Negative for arthralgias and myalgias.   Skin:  Negative for rash.   Neurological:  Negative for dizziness, weakness and headaches.   All other systems reviewed and are negative.      Current Outpatient Medications on File Prior to Visit   Medication Sig   • cholecalciferol (VITAMIN D3) 1,000 units tablet Take 1,000 Units by mouth daily   • Cyanocobalamin (VITAMIN B 12 PO)  "Take by mouth   • Misc Natural Products (Elderberry/Vitamin C/Zinc) CHEW Chew   • multivitamin (THERAGRAN) TABS Take 1 tablet by mouth daily   • Omega-3 1000 MG CAPS Take by mouth   • rosuvastatin (CRESTOR) 10 MG tablet Take 1 tablet (10 mg total) by mouth daily   • TURMERIC PO Take by mouth   • azelastine (ASTELIN) 0.1 % nasal spray 2 sprays into each nostril 2 (two) times a day Use in each nostril as directed (Patient not taking: Reported on 4/15/2024)   • cyclobenzaprine (FLEXERIL) 10 mg tablet Take 1 tablet (10 mg total) by mouth daily at bedtime (Patient not taking: Reported on 2/28/2024)       Objective     /70   Pulse 83   Temp 97.5 °F (36.4 °C)   Ht 5' 7\" (1.702 m)   Wt 79.4 kg (175 lb)   SpO2 96%   BMI 27.41 kg/m²     Physical Exam  Vitals and nursing note reviewed.   Constitutional:       General: She is not in acute distress.     Appearance: Normal appearance. She is well-developed. She is ill-appearing. She is not diaphoretic.   HENT:      Head: Normocephalic and atraumatic.      Right Ear: Tympanic membrane, ear canal and external ear normal.      Left Ear: Tympanic membrane, ear canal and external ear normal.      Nose: Nose normal.   Eyes:      General: Lids are normal.      Conjunctiva/sclera: Conjunctivae normal.   Neck:      Vascular: No JVD.      Trachea: No tracheal deviation.   Cardiovascular:      Rate and Rhythm: Normal rate and regular rhythm.      Pulses: Normal pulses.      Heart sounds: Normal heart sounds. No murmur heard.  Pulmonary:      Effort: Pulmonary effort is normal. No accessory muscle usage or respiratory distress.      Breath sounds: Decreased breath sounds present. No wheezing, rhonchi or rales.   Lymphadenopathy:      Cervical: Cervical adenopathy present.   Skin:     Findings: No rash.   Neurological:      Mental Status: She is alert.       Peter Meadows MD    "

## 2024-04-25 ENCOUNTER — NURSE TRIAGE (OUTPATIENT)
Age: 64
End: 2024-04-25

## 2024-04-25 DIAGNOSIS — R05.9 COUGH, UNSPECIFIED TYPE: Primary | ICD-10-CM

## 2024-04-25 RX ORDER — DEXTROMETHORPHAN HYDROBROMIDE AND PROMETHAZINE HYDROCHLORIDE 15; 6.25 MG/5ML; MG/5ML
5 SYRUP ORAL 4 TIMES DAILY PRN
Qty: 180 ML | Refills: 0 | Status: SHIPPED | OUTPATIENT
Start: 2024-04-25

## 2024-04-25 NOTE — TELEPHONE ENCOUNTER
Patient calls in stating she was seen on 4 15 2024 for cough / congestion. Patient sttaes she has improved however cough lingers and keeps her awake at night.  Denies SOB, chest pain, wheezing, fevers.     Patient requesting the doctor send cough syrup t o her pharmacy. Please call the patient when it is sent or if there is a problem with the request.

## 2024-04-28 ENCOUNTER — HOSPITAL ENCOUNTER (EMERGENCY)
Facility: HOSPITAL | Age: 64
Discharge: HOME | End: 2024-04-28
Attending: EMERGENCY MEDICINE
Payer: COMMERCIAL

## 2024-04-28 ENCOUNTER — APPOINTMENT (EMERGENCY)
Dept: RADIOLOGY | Facility: HOSPITAL | Age: 64
End: 2024-04-28
Payer: COMMERCIAL

## 2024-04-28 VITALS
SYSTOLIC BLOOD PRESSURE: 121 MMHG | WEIGHT: 178.13 LBS | RESPIRATION RATE: 18 BRPM | HEART RATE: 86 BPM | BODY MASS INDEX: 27.96 KG/M2 | HEIGHT: 67 IN | OXYGEN SATURATION: 96 % | TEMPERATURE: 98.2 F | DIASTOLIC BLOOD PRESSURE: 68 MMHG

## 2024-04-28 DIAGNOSIS — J45.901 ACUTE ASTHMA EXACERBATION: Primary | ICD-10-CM

## 2024-04-28 DIAGNOSIS — R05.9 COUGH: ICD-10-CM

## 2024-04-28 DIAGNOSIS — R07.9 ACUTE CHEST PAIN: ICD-10-CM

## 2024-04-28 DIAGNOSIS — R03.0 ELEVATED BLOOD PRESSURE READING: ICD-10-CM

## 2024-04-28 DIAGNOSIS — R06.00 DYSPNEA: ICD-10-CM

## 2024-04-28 DIAGNOSIS — J18.9 PNEUMONIA: ICD-10-CM

## 2024-04-28 LAB
ALBUMIN SERPL BCP-MCNC: 4.3 G/DL (ref 3.5–5)
ALP SERPL-CCNC: 73 U/L (ref 34–104)
ALT SERPL W P-5'-P-CCNC: 12 U/L (ref 7–52)
ANION GAP SERPL CALCULATED.3IONS-SCNC: 6 MMOL/L (ref 4–13)
APTT PPP: 25 SECONDS (ref 23–37)
AST SERPL W P-5'-P-CCNC: 13 U/L (ref 13–39)
ATRIAL RATE: 87 BPM
BASOPHILS # BLD AUTO: 0.03 THOUSANDS/ÂΜL (ref 0–0.1)
BASOPHILS NFR BLD AUTO: 1 % (ref 0–1)
BILIRUB SERPL-MCNC: 0.61 MG/DL (ref 0.2–1)
BUN SERPL-MCNC: 14 MG/DL (ref 5–25)
CALCIUM SERPL-MCNC: 9.5 MG/DL (ref 8.4–10.2)
CARDIAC TROPONIN I PNL SERPL HS: 2 NG/L
CHLORIDE SERPL-SCNC: 104 MMOL/L (ref 96–108)
CO2 SERPL-SCNC: 27 MMOL/L (ref 21–32)
CREAT SERPL-MCNC: 0.89 MG/DL (ref 0.6–1.3)
EOSINOPHIL # BLD AUTO: 0.09 THOUSAND/ÂΜL (ref 0–0.61)
EOSINOPHIL NFR BLD AUTO: 1 % (ref 0–6)
ERYTHROCYTE [DISTWIDTH] IN BLOOD BY AUTOMATED COUNT: 12.8 % (ref 11.6–15.1)
FLUAV RNA RESP QL NAA+PROBE: NEGATIVE
FLUBV RNA RESP QL NAA+PROBE: NEGATIVE
GFR SERPL CREATININE-BSD FRML MDRD: 69 ML/MIN/1.73SQ M
GLUCOSE SERPL-MCNC: 119 MG/DL (ref 65–140)
HCG SERPL QL: NEGATIVE
HCT VFR BLD AUTO: 48.5 % (ref 34.8–46.1)
HGB BLD-MCNC: 15.5 G/DL (ref 11.5–15.4)
IMM GRANULOCYTES # BLD AUTO: 0.04 THOUSAND/UL (ref 0–0.2)
IMM GRANULOCYTES NFR BLD AUTO: 1 % (ref 0–2)
INR PPP: 0.89 (ref 0.84–1.19)
LACTATE SERPL-SCNC: 1.1 MMOL/L (ref 0.5–2)
LYMPHOCYTES # BLD AUTO: 0.85 THOUSANDS/ÂΜL (ref 0.6–4.47)
LYMPHOCYTES NFR BLD AUTO: 13 % (ref 14–44)
MCH RBC QN AUTO: 27.7 PG (ref 26.8–34.3)
MCHC RBC AUTO-ENTMCNC: 32 G/DL (ref 31.4–37.4)
MCV RBC AUTO: 87 FL (ref 82–98)
MONOCYTES # BLD AUTO: 0.72 THOUSAND/ÂΜL (ref 0.17–1.22)
MONOCYTES NFR BLD AUTO: 11 % (ref 4–12)
NEUTROPHILS # BLD AUTO: 4.68 THOUSANDS/ÂΜL (ref 1.85–7.62)
NEUTS SEG NFR BLD AUTO: 73 % (ref 43–75)
NRBC BLD AUTO-RTO: 0 /100 WBCS
P AXIS: 52 DEGREES
PLATELET # BLD AUTO: 312 THOUSANDS/UL (ref 149–390)
PMV BLD AUTO: 9.6 FL (ref 8.9–12.7)
POTASSIUM SERPL-SCNC: 3.9 MMOL/L (ref 3.5–5.3)
PR INTERVAL: 158 MS
PROCALCITONIN SERPL-MCNC: 0.06 NG/ML
PROT SERPL-MCNC: 7.3 G/DL (ref 6.4–8.4)
PROTHROMBIN TIME: 12.6 SECONDS (ref 11.6–14.5)
QRS AXIS: 37 DEGREES
QRSD INTERVAL: 80 MS
QT INTERVAL: 358 MS
QTC INTERVAL: 430 MS
RBC # BLD AUTO: 5.59 MILLION/UL (ref 3.81–5.12)
RSV RNA RESP QL NAA+PROBE: NEGATIVE
SARS-COV-2 RNA RESP QL NAA+PROBE: NEGATIVE
SODIUM SERPL-SCNC: 137 MMOL/L (ref 135–147)
T WAVE AXIS: 39 DEGREES
VENTRICULAR RATE: 87 BPM
WBC # BLD AUTO: 6.41 THOUSAND/UL (ref 4.31–10.16)

## 2024-04-28 PROCEDURE — 84484 ASSAY OF TROPONIN QUANT: CPT | Performed by: EMERGENCY MEDICINE

## 2024-04-28 PROCEDURE — 85610 PROTHROMBIN TIME: CPT | Performed by: EMERGENCY MEDICINE

## 2024-04-28 PROCEDURE — 83605 ASSAY OF LACTIC ACID: CPT | Performed by: EMERGENCY MEDICINE

## 2024-04-28 PROCEDURE — 93010 ELECTROCARDIOGRAM REPORT: CPT | Performed by: INTERNAL MEDICINE

## 2024-04-28 PROCEDURE — 0241U HB NFCT DS VIR RESP RNA 4 TRGT: CPT | Performed by: EMERGENCY MEDICINE

## 2024-04-28 PROCEDURE — 93005 ELECTROCARDIOGRAM TRACING: CPT

## 2024-04-28 PROCEDURE — 71046 X-RAY EXAM CHEST 2 VIEWS: CPT

## 2024-04-28 PROCEDURE — 99285 EMERGENCY DEPT VISIT HI MDM: CPT | Performed by: EMERGENCY MEDICINE

## 2024-04-28 PROCEDURE — 84703 CHORIONIC GONADOTROPIN ASSAY: CPT | Performed by: EMERGENCY MEDICINE

## 2024-04-28 PROCEDURE — 36415 COLL VENOUS BLD VENIPUNCTURE: CPT | Performed by: EMERGENCY MEDICINE

## 2024-04-28 PROCEDURE — 85025 COMPLETE CBC W/AUTO DIFF WBC: CPT | Performed by: EMERGENCY MEDICINE

## 2024-04-28 PROCEDURE — 80053 COMPREHEN METABOLIC PANEL: CPT | Performed by: EMERGENCY MEDICINE

## 2024-04-28 PROCEDURE — 85730 THROMBOPLASTIN TIME PARTIAL: CPT | Performed by: EMERGENCY MEDICINE

## 2024-04-28 PROCEDURE — 99285 EMERGENCY DEPT VISIT HI MDM: CPT

## 2024-04-28 PROCEDURE — 87040 BLOOD CULTURE FOR BACTERIA: CPT | Performed by: EMERGENCY MEDICINE

## 2024-04-28 PROCEDURE — 84145 PROCALCITONIN (PCT): CPT | Performed by: EMERGENCY MEDICINE

## 2024-04-28 PROCEDURE — 96361 HYDRATE IV INFUSION ADD-ON: CPT

## 2024-04-28 PROCEDURE — 94640 AIRWAY INHALATION TREATMENT: CPT

## 2024-04-28 PROCEDURE — 96365 THER/PROPH/DIAG IV INF INIT: CPT

## 2024-04-28 RX ORDER — PREDNISONE 20 MG/1
40 TABLET ORAL DAILY
Qty: 8 TABLET | Refills: 0 | Status: SHIPPED | OUTPATIENT
Start: 2024-04-29 | End: 2024-05-03

## 2024-04-28 RX ORDER — AZITHROMYCIN 250 MG/1
TABLET, FILM COATED ORAL
Qty: 6 TABLET | Refills: 0 | Status: SHIPPED | OUTPATIENT
Start: 2024-04-28 | End: 2024-05-02

## 2024-04-28 RX ORDER — MAGNESIUM SULFATE 1 G/100ML
1 INJECTION INTRAVENOUS ONCE
Status: COMPLETED | OUTPATIENT
Start: 2024-04-28 | End: 2024-04-28

## 2024-04-28 RX ORDER — ALBUTEROL SULFATE 2.5 MG/3ML
5 SOLUTION RESPIRATORY (INHALATION) ONCE
Status: COMPLETED | OUTPATIENT
Start: 2024-04-28 | End: 2024-04-28

## 2024-04-28 RX ORDER — PREDNISONE 20 MG/1
40 TABLET ORAL ONCE
Status: COMPLETED | OUTPATIENT
Start: 2024-04-28 | End: 2024-04-28

## 2024-04-28 RX ORDER — AMOXICILLIN 250 MG/1
1000 CAPSULE ORAL ONCE
Status: COMPLETED | OUTPATIENT
Start: 2024-04-28 | End: 2024-04-28

## 2024-04-28 RX ORDER — ALBUTEROL SULFATE 2.5 MG/3ML
2.5 SOLUTION RESPIRATORY (INHALATION) EVERY 6 HOURS PRN
Qty: 75 ML | Refills: 0 | Status: SHIPPED | OUTPATIENT
Start: 2024-04-28

## 2024-04-28 RX ORDER — AMOXICILLIN 500 MG/1
1000 CAPSULE ORAL EVERY 8 HOURS SCHEDULED
Qty: 30 CAPSULE | Refills: 0 | Status: SHIPPED | OUTPATIENT
Start: 2024-04-28 | End: 2024-05-03

## 2024-04-28 RX ADMIN — ALBUTEROL SULFATE 5 MG: 2.5 SOLUTION RESPIRATORY (INHALATION) at 09:45

## 2024-04-28 RX ADMIN — IPRATROPIUM BROMIDE 0.5 MG: 0.5 SOLUTION RESPIRATORY (INHALATION) at 09:45

## 2024-04-28 RX ADMIN — MAGNESIUM SULFATE HEPTAHYDRATE 1 G: 1 INJECTION, SOLUTION INTRAVENOUS at 11:08

## 2024-04-28 RX ADMIN — AMOXICILLIN 1000 MG: 250 CAPSULE ORAL at 12:05

## 2024-04-28 RX ADMIN — SODIUM CHLORIDE 500 ML: 0.9 INJECTION, SOLUTION INTRAVENOUS at 10:37

## 2024-04-28 RX ADMIN — PREDNISONE 40 MG: 20 TABLET ORAL at 09:45

## 2024-04-28 NOTE — ED PROVIDER NOTES
History  Chief Complaint   Patient presents with    URI     Pt reports URI x2 weeks, not getting better, c/o fever/chills last night, cough, pain in chest and pain, last ibuprofen 0100, no tylenol PTA     Patient is a 63-year-old female, with a history significant for asthma (no history of intubation or ICU admission for asthma), who presents to the ED today for evaluation of a roughly 2-week history of constant, gradual onset, progressively worsening dyspnea.  There is associated intermittently productive cough, sore throat, fever up to 102.  Furthermore, starting a couple days ago, patient also reports the onset of chest pain (pressure/tight in character, radiating to the back, constant since its onset, waxing and waning in course).  Patient attempted a course of azithromycin and albuterol to remit her symptoms.  Patient is also taken ibuprofen but no Tylenol.  Exertion exacerbates her chest pain and the chest pain is nonpleuritic.  Patient's , present in room and friend collateral history, states patient is not confused.  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  Patient is without other concerns at this time.        Prior to Admission Medications   Prescriptions Last Dose Informant Patient Reported? Taking?   Cyanocobalamin (VITAMIN B 12 PO)  Self Yes No   Sig: Take by mouth   Misc Natural Products (Elderberry/Vitamin C/Zinc) CHEW  Self Yes No   Sig: Chew   Omega-3 1000 MG CAPS  Self Yes No   Sig: Take by mouth   TURMERIC PO  Self Yes No   Sig: Take by mouth   albuterol (Ventolin HFA) 90 mcg/act inhaler   No No   Sig: Inhale 2 puffs every 4 (four) hours as needed for wheezing   azelastine (ASTELIN) 0.1 % nasal spray  Self No No   Si sprays into each nostril 2 (two) times a day Use in each nostril as directed   Patient not taking: Reported on 4/15/2024   cholecalciferol (VITAMIN D3) 1,000 units tablet  Self  Yes No   Sig: Take 1,000 Units by mouth daily   cyclobenzaprine (FLEXERIL) 10 mg tablet  Self No No   Sig: Take 1 tablet (10 mg total) by mouth daily at bedtime   Patient not taking: Reported on 2/28/2024   multivitamin (THERAGRAN) TABS  Self Yes No   Sig: Take 1 tablet by mouth daily   promethazine-dextromethorphan (PHENERGAN-DM) 6.25-15 mg/5 mL oral syrup   No No   Sig: Take 5 mL by mouth 4 (four) times a day as needed for cough   rosuvastatin (CRESTOR) 10 MG tablet  Self No No   Sig: Take 1 tablet (10 mg total) by mouth daily      Facility-Administered Medications: None       Past Medical History:   Diagnosis Date    Congenital hip dysplasia     Seasonal allergies        Past Surgical History:   Procedure Laterality Date    HIP SURGERY      OVARIAN CYST REMOVAL      TUBAL LIGATION         Family History   Problem Relation Age of Onset    Lung cancer Mother     Lung cancer Father     Cancer Father         Urinary Bladder    Thyroid disease Daughter     Heart disease Maternal Grandmother     Heart disease Maternal Grandfather     Heart disease Paternal Grandmother     No Known Problems Paternal Grandfather     No Known Problems Son     Cancer Maternal Aunt         unsure of type    Lung cancer Maternal Aunt     Cancer Paternal Aunt         unsure of type     I have reviewed and agree with the history as documented.    E-Cigarette/Vaping    E-Cigarette Use Never User      E-Cigarette/Vaping Substances    Nicotine No     THC No     CBD No     Flavoring No     Other No     Unknown No      Social History     Tobacco Use    Smoking status: Former     Passive exposure: Past    Smokeless tobacco: Never   Vaping Use    Vaping status: Never Used   Substance Use Topics    Alcohol use: Yes     Comment: rare    Drug use: Never       Review of Systems   Constitutional:  Positive for fever.   HENT:  Negative for trouble swallowing.    Eyes:  Negative for visual disturbance.   Respiratory:  Positive for cough and shortness of  breath.    Cardiovascular:  Positive for chest pain.   Gastrointestinal:  Negative for abdominal pain.   Endocrine: Negative for polyuria.   Genitourinary:  Negative for dysuria.   Musculoskeletal:  Negative for gait problem.   Skin:  Negative for rash.   Allergic/Immunologic: Negative for environmental allergies.   Neurological:  Negative for weakness and numbness (No new).   Hematological:  Negative for adenopathy.   Psychiatric/Behavioral:  Negative for confusion.    All other systems reviewed and are negative.      Physical Exam  Physical Exam  Vitals and nursing note reviewed.   Constitutional:       General: She is not in acute distress.     Appearance: She is not toxic-appearing or diaphoretic.      Comments: Patient appears comfortable during my evaluation    HENT:      Head: Normocephalic and atraumatic.      Right Ear: External ear normal.      Left Ear: External ear normal.      Nose: Nose normal. No rhinorrhea.      Mouth/Throat:      Mouth: Mucous membranes are moist.      Pharynx: Oropharynx is clear. No oropharyngeal exudate or posterior oropharyngeal erythema.      Comments: Uvula midline. No oropharyngeal or submandibular mass/swelling  Eyes:      General: No scleral icterus.        Right eye: No discharge.         Left eye: No discharge.      Conjunctiva/sclera: Conjunctivae normal.      Pupils: Pupils are equal, round, and reactive to light.   Neck:      Comments: Patient is spontaneously rotating their neck to the left and right during the history and physical exam interaction without difficulty or apparent discomfort    Cardiovascular:      Rate and Rhythm: Normal rate and regular rhythm.      Pulses: Normal pulses.      Heart sounds: Normal heart sounds. No murmur heard.     No friction rub. No gallop.      Comments: 2+ Radial and DP bilaterally    Systolic blood pressure difference less than 20 between the upper extremities  Pulmonary:      Effort: Pulmonary effort is normal. No respiratory  distress.      Breath sounds: Normal breath sounds. No stridor. No wheezing, rhonchi or rales.   Abdominal:      General: Abdomen is flat. There is no distension.      Palpations: Abdomen is soft.      Tenderness: There is no abdominal tenderness. There is no right CVA tenderness, left CVA tenderness, guarding or rebound.   Musculoskeletal:         General: No tenderness (No pain with calf squeeze) or deformity.      Cervical back: Neck supple. No tenderness. No muscular tenderness.      Right lower leg: No edema.      Left lower leg: No edema.   Lymphadenopathy:      Cervical: No cervical adenopathy.   Skin:     General: Skin is warm and dry.      Capillary Refill: Capillary refill takes less than 2 seconds.   Neurological:      Mental Status: She is alert.      Comments: Patient is speaking clearly in complete sentences.  Patient is answering appropriately and able follow commands.  Patient is moving all four extremities spontaneously.  No facial droop.  Tongue midline.      Psychiatric:         Mood and Affect: Mood normal.         Behavior: Behavior normal.         Vital Signs  ED Triage Vitals [04/28/24 0927]   Temperature Pulse Respirations Blood Pressure SpO2   98.2 °F (36.8 °C) 98 20 114/72 96 %      Temp Source Heart Rate Source Patient Position - Orthostatic VS BP Location FiO2 (%)   Oral Monitor Sitting Left arm --      Pain Score       5           Vitals:    04/28/24 0934 04/28/24 0945 04/28/24 1127 04/28/24 1130   BP: 138/66 123/69 121/68 121/68   Pulse: 91 86 90 86   Patient Position - Orthostatic VS: Sitting  Lying Lying         Visual Acuity      ED Medications  Medications   albuterol inhalation solution 5 mg (5 mg Nebulization Given 4/28/24 0945)   ipratropium (ATROVENT) 0.02 % inhalation solution 0.5 mg (0.5 mg Nebulization Given 4/28/24 0945)   predniSONE tablet 40 mg (40 mg Oral Given 4/28/24 0945)   magnesium sulfate IVPB (premix) SOLN 1 g (0 g Intravenous Stopped 4/28/24 1207)   sodium  chloride 0.9 % bolus 500 mL (0 mL Intravenous Stopped 4/28/24 1203)   amoxicillin (AMOXIL) capsule 1,000 mg (1,000 mg Oral Given 4/28/24 1205)       Diagnostic Studies  Results Reviewed       Procedure Component Value Units Date/Time    Blood culture #1 [[id number]] Collected: 04/28/24 0949    Lab Status: Preliminary result Specimen: Blood from Arm, Left Updated: 04/28/24 1701     Blood Culture Received in Microbiology Lab. Culture in Progress.    Blood culture #2 [[id number]] Collected: 04/28/24 0941    Lab Status: Preliminary result Specimen: Blood from Arm, Right Updated: 04/28/24 1301     Blood Culture Received in Microbiology Lab. Culture in Progress.    COVID/FLU/RSV [[id number]]  (Normal) Collected: 04/28/24 0941    Lab Status: Final result Specimen: Nares from Nose Updated: 04/28/24 1127     SARS-CoV-2 Negative     INFLUENZA A PCR Negative     INFLUENZA B PCR Negative     RSV PCR Negative    Narrative:      FOR PEDIATRIC PATIENTS - copy/paste COVID Guidelines URL to browser: [email or web address]    SARS-CoV-2 assay is a Nucleic Acid Amplification assay intended for the  qualitative detection of nucleic acid from SARS-CoV-2 in nasopharyngeal  swabs. Results are for the presumptive identification of SARS-CoV-2 RNA.    Positive results are indicative of infection with SARS-CoV-2, the virus  causing COVID-19, but do not rule out bacterial infection or co-infection  with other viruses. Laboratories within the [place] and its  territories are required to report all positive results to the appropriate  public health authorities. Negative results do not preclude SARS-CoV-2  infection and should not be used as the sole basis for treatment or other  patient management decisions. Negative results must be combined with  clinical observations, patient history, and epidemiological information.  This test has not been FDA cleared or approved.    This test has been  authorized by FDA under an Emergency Use Authorization  (EUA). This test is only authorized for the duration of time the  declaration that circumstances exist justifying the authorization of the  emergency use of an in vitro diagnostic tests for detection of SARS-CoV-2  virus and/or diagnosis of COVID-19 infection under section 564(b)(1) of  the Act, 21 U.S.C. 360bbb-3(b)(1), unless the authorization is terminated  or revoked sooner. The test has been validated but independent review by FDA  and CLIA is pending.    Test performed using [employer] GeneXpert: This RT-PCR assay targets N2,  a region unique to SARS-CoV-2. A conserved region in the E-gene was chosen  for pan-Sarbecovirus detection which includes SARS-CoV-2.    According to CMS-2020-01-R, this platform meets the definition of high-throughput technology.    Procalcitonin [[id number]]  (Normal) Collected: 04/28/24 0941    Lab Status: Final result Specimen: Blood from Arm, Right Updated: 04/28/24 1017     Procalcitonin 0.06 ng/ml     hCG, qualitative pregnancy [[id number]]  (Normal) Collected: 04/28/24 0941    Lab Status: Final result Specimen: Blood from Arm, Right Updated: 04/28/24 1016     Preg, Serum Negative    HS Troponin 0hr (reflex protocol) [[id number]]  (Normal) Collected: 04/28/24 0941    Lab Status: Final result Specimen: Blood from Arm, Right Updated: 04/28/24 1015     hs TnI 0hr 2 ng/L     Lactic acid [[id number]]  (Normal) Collected: 04/28/24 0941    Lab Status: Final result Specimen: Blood from Arm, Right Updated: 04/28/24 1009     LACTIC ACID 1.1 mmol/L     Narrative:      Result may be elevated if tourniquet was used during collection.    Comprehensive metabolic panel [[id number]] Collected: 04/28/24 0941    Lab Status: Final result Specimen: Blood from Arm, Right Updated: 04/28/24 1008     Sodium 137 mmol/L      Potassium 3.9 mmol/L      Chloride 104 mmol/L      CO2 27 mmol/L      ANION GAP 6 mmol/L      BUN 14 mg/dL      Creatinine 0.89 mg/dL       Glucose 119 mg/dL      Calcium 9.5 mg/dL      AST 13 U/L      ALT 12 U/L      Alkaline Phosphatase 73 U/L      Total Protein 7.3 g/dL      Albumin 4.3 g/dL      Total Bilirubin 0.61 mg/dL      eGFR 69 ml/min/1.73sq m     Narrative:      National Kidney Disease Foundation guidelines for Chronic Kidney Disease (CKD):     Stage 1 with normal or high GFR (GFR > 90 mL/min/1.73 square meters)    Stage 2 Mild CKD (GFR = 60-89 mL/min/1.73 square meters)    Stage 3A Moderate CKD (GFR = 45-59 mL/min/1.73 square meters)    Stage 3B Moderate CKD (GFR = 30-44 mL/min/1.73 square meters)    Stage 4 Severe CKD (GFR = 15-29 mL/min/1.73 square meters)    Stage 5 End Stage CKD (GFR <15 mL/min/1.73 square meters)  Note: GFR calculation is accurate only with a steady state creatinine    Protime-INR [[id number]]  (Normal) Collected: 04/28/24 0941    Lab Status: Final result Specimen: Blood from Arm, Right Updated: 04/28/24 1005     Protime 12.6 seconds      INR 0.89    APTT [[id number]]  (Normal) Collected: 04/28/24 0941    Lab Status: Final result Specimen: Blood from Arm, Right Updated: 04/28/24 1005     PTT 25 seconds     CBC and differential [[id number]]  (Abnormal) Collected: 04/28/24 0941    Lab Status: Final result Specimen: Blood from Arm, Right Updated: 04/28/24 0959     WBC 6.41 Thousand/uL      RBC 5.59 Million/uL      Hemoglobin 15.5 g/dL      Hematocrit 48.5 %      MCV 87 fL      MCH 27.7 pg      MCHC 32.0 g/dL      RDW 12.8 %      MPV 9.6 fL      Platelets 312 Thousands/uL      nRBC 0 /100 WBCs      Segmented % 73 %      Immature Grans % 1 %      Lymphocytes % 13 %      Monocytes % 11 %      Eosinophils Relative 1 %      Basophils Relative 1 %      Absolute Neutrophils 4.68 Thousands/µL      Absolute Immature Grans 0.04 Thousand/uL      Absolute Lymphocytes 0.85 Thousands/µL      Absolute Monocytes 0.72 Thousand/µL      Eosinophils Absolute 0.09 Thousand/µL      Basophils Absolute 0.03 Thousands/µL                    XR chest  2 views   ED Interpretation by Jose Allen MD (04/28 1139)   Per my independent interpretation: The left midlung field consolidation      Final Result by Nanette Orlando MD (04/28 1916)      Mild consolidation in the left midlung compatible with pneumonia.      This corresponds with the preliminary interpretation by the emergency department clinician.            Workstation performed: [id number]                    Procedures  Procedures         ED Course  ED Course as of 04/28/24 1939   [place] Apr 28, 2024   0933 ECG per my independent interpretation: Normal rate, regular rhythm, no ectopy, normal axis, no ST elevations or depressions     1001 WBC: 6.41  WNL - Patient does not meet SIRS    1021 hs TnI 0hr: 2  WNL - Given symptom presence for >3 hours, no need for delta    1031 Patient reports improvement in symptoms on reevaluation.  No more wheezing on repeat pulmonary auscultation.  Good air movement.  Patient requesting nebulizer for home   1153 Vital signs within normal limits on reevaluation.  Patient continues to appear well.  She reports improvement in breathing but still has myalgias.  Patient feels comfortable going home.  Results reviewed with patient.  Given no significant comorbidities and distant history of tobacco use, will treat pneumonia with high-dose amoxicillin.  Strict return precautions provided.             HEART Risk Score      Flowsheet Row Most Recent Value   Heart Score Risk Calculator    History 1 Filed at: 04/28/2024 1022   ECG 1 Filed at: 04/28/2024 1022   Age 1 Filed at: 04/28/2024 1022   Risk Factors 1 Filed at: 04/28/2024 1022   Troponin 0 Filed at: 04/28/2024 1022   HEART Score 4 Filed at: 04/28/2024 1022                          SBIRT 22yo+      Flowsheet Row Most Recent Value   Initial Alcohol Screen: US AUDIT-C     1. How often do you have a drink containing alcohol? 0 Filed at: 04/28/2024 0935   2. How many drinks containing alcohol do you have on a typical day you  are drinking?  0 Filed at: 04/28/2024 0935   3a. Male UNDER 65: How often do you have five or more drinks on one occasion? 0 Filed at: 04/28/2024 0935   3b. FEMALE Any Age, or MALE 65+: How often do you have 4 or more drinks on one occassion? 0 Filed at: 04/28/2024 0935   Audit-C Score 0 Filed at: 04/28/2024 0935   RAFFI: How many times in the past year have you...    Used an illegal drug or used a prescription medication for non-medical reasons? Never Filed at: 04/28/2024 0935                      Medical Decision Making  Patient is a 63-year-old female, with a history significant for asthma (no history of intubation or ICU admission for asthma), who presents to the ED today for evaluation of a roughly 2-week history of constant, gradual onset, progressively worsening dyspnea.  There is associated intermittently productive cough, sore throat, fever up to 102.  Furthermore, starting a couple days ago, patient also reports the onset of chest pain (pressure/tight in character, radiating to the back, constant since its onset, waxing and waning in course).  Patient attempted a course of azithromycin and albuterol to remit her symptoms.  Patient is also taken ibuprofen but no Tylenol.  Exertion exacerbates her chest pain and the chest pain is nonpleuritic.  Patient's , present in room and friend collateral history, states patient is not confused.  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  Patient is currently afebrile and hemodynamically stable.  Her physical exam is notable for expiratory wheezing primarily in the lower lung fields and on the right, clear heart auscultation, no lower extremity edema, no pain with calf squeeze, systolic blood pressure difference less than 20 between the upper extremities, 2+ pulses all 4 extremities, soft nontender abdomen.  This presentation is concerning for: Viral syndrome, acute  asthma exacerbation.  Also considered ACS, anemia, ELBERT, electrolyte abnormality.  No reason to suspect aortic dissection, pericarditis, myocarditis at this time based on history physical exam.  Patient at risk for pneumonia.  Will investigate with sepsis panel order set, cardiac workup.  Will manage with bronchodilators, steroids, further based on workup.    Amount and/or Complexity of Data Reviewed  Labs: ordered. Decision-making details documented in ED Course.  Radiology: ordered and independent interpretation performed.    Risk  Prescription drug management.             Disposition  Final diagnoses:   Acute chest pain   Acute asthma exacerbation   Dyspnea   Cough   Elevated blood pressure reading   Pneumonia     Time reflects when diagnosis was documented in both MDM as applicable and the Disposition within this note       Time User Action Codes Description Comment    4/28/2024 10:22 AM Legare, Christopher A Add [R07.9] Acute chest pain     4/28/2024 10:23 AM Legare, Christopher A Add [J45.901] Acute asthma exacerbation     4/28/2024 10:23 AM Legare, Christopher A Modify [R07.9] Acute chest pain     4/28/2024 10:23 AM Legare, Christopher A Modify [J45.901] Acute asthma exacerbation     4/28/2024 10:23 AM Legare, Christopher A Add [R06.00] Dyspnea     4/28/2024 10:23 AM Legare, Christopher A Add [R05.9] Cough     4/28/2024 10:23 AM Legare, Christopher A Add [R03.0] Elevated blood pressure reading     4/28/2024 11:39 AM Legare, Christopher A Add [J18.9] Pneumonia           ED Disposition       ED Disposition   Discharge    Condition   Stable    Date/Time   Sun Apr 28, 2024 1141    Comment   Gloria Ramirez discharge to home/self care.                   Follow-up Information       Follow up With Specialties Details Why Contact Info Additional Information    Marge Monk,  Family Medicine Schedule an appointment as soon as possible for a visit   [address]  [address]  [place]  [address]  [phone]       [facility] Schedule an appointment as soon as possible for a visit   [address] [facility] [address] [phone] [facility], [address], [place], [place], [id number] [phone]            Discharge Medication List as of 4/28/2024 11:41 AM        START taking these medications    Details   albuterol (2.5 mg/3 mL) 0.083 % nebulizer solution Take 3 mL (2.5 mg total) by nebulization every 6 (six) hours as needed for wheezing or shortness of breath, Starting Sun 4/28/2024, Normal      amoxicillin (AMOXIL) 500 mg capsule Take 2 capsules (1,000 mg total) by mouth every 8 (eight) hours for 5 days, Starting Sun 4/28/2024, Until Fri 5/3/2024, Normal      azithromycin (ZITHROMAX) 250 mg tablet Take 2 tablets today then 1 tablet daily x 4 days, Normal      predniSONE 20 mg tablet Take 2 tablets (40 mg total) by mouth daily for 4 days Do not start before April 29, 2024., Starting Mon 4/29/2024, Until Fri 5/3/2024, Normal           CONTINUE these medications which have NOT CHANGED    Details   albuterol (Ventolin HFA) 90 mcg/act inhaler Inhale 2 puffs every 4 (four) hours as needed for wheezing, Starting Mon 4/15/2024, Normal      azelastine (ASTELIN) 0.1 % nasal spray 2 sprays into each nostril 2 (two) times a day Use in each nostril as directed, Starting Tue 5/2/2023, Normal      cholecalciferol (VITAMIN D3) 1,000 units tablet Take 1,000 Units by mouth daily, Historical Med      Cyanocobalamin (VITAMIN B 12 PO) Take by mouth, Historical Med      cyclobenzaprine (FLEXERIL) 10 mg tablet Take 1 tablet (10 mg total) by mouth daily at bedtime, Starting Mon 1/22/2024, Normal      Misc Natural Products (Elderberry/Vitamin C/Zinc) CHEW Chew, Historical Med      multivitamin (THERAGRAN) TABS Take 1 tablet by mouth daily, Historical Med      Omega-3 1000 MG CAPS Take by mouth, Historical Med       promethazine-dextromethorphan (PHENERGAN-DM) 6.25-15 mg/5 mL oral syrup Take 5 mL by mouth 4 (four) times a day as needed for cough, Starting Thu 4/25/2024, Normal      rosuvastatin (CRESTOR) 10 MG tablet Take 1 tablet (10 mg total) by mouth daily, Starting Mon 12/11/2023, Normal      TURMERIC PO Take by mouth, Historical Med             Outpatient Discharge Orders   Nebulizer       PDMP Review       None            ED Provider  Electronically Signed by             Jose Allen MD  04/28/24 1939

## 2024-04-28 NOTE — DISCHARGE INSTRUCTIONS
You were evaluated in the emergency department for: cough, shortness of breath. You can access your current and pending results through [facility]'s [facility]. A radiologist will take a second look at your X-Rays, if you had any, and you will be contacted with any new findings.     You should follow-up with your primary care provider, as soon as possible, for re-evaluation.  If you do not have a primary care provider, I have referred you to [facility]. You will be contacted about scheduling an appointment. Their phone number is also included on this paperwork.  You have also been referred to cardiology and you should follow-up with them as well.    Your workup revealed no emergent features at this time; however, many disease processes are dynamic:    Please, return to the emergency department if you experience new or worsening symptoms, fever, chest pain, shortness of breath, difficulty breathing, dizziness, abdominal pain, persistent nausea/vomiting, syncope or passing out, blood in your urine or stool, coughing up blood, leg swelling/pain, urinary retention, bowel or bladder incontinence, numbness between your legs.    Additionally, your blood pressure was measured to be high. This is something that you should discuss with your primary care provider and have re-checked within one week.

## 2024-04-29 DIAGNOSIS — E78.00 HYPERCHOLESTEROLEMIA: ICD-10-CM

## 2024-04-30 ENCOUNTER — OFFICE VISIT (OUTPATIENT)
Dept: FAMILY MEDICINE CLINIC | Facility: CLINIC | Age: 64
End: 2024-04-30
Payer: COMMERCIAL

## 2024-04-30 VITALS
OXYGEN SATURATION: 96 % | TEMPERATURE: 97.2 F | DIASTOLIC BLOOD PRESSURE: 70 MMHG | HEIGHT: 67 IN | HEART RATE: 72 BPM | SYSTOLIC BLOOD PRESSURE: 110 MMHG | BODY MASS INDEX: 28.08 KG/M2 | WEIGHT: 178.9 LBS

## 2024-04-30 DIAGNOSIS — J18.9 PNEUMONIA OF LEFT LOWER LOBE DUE TO INFECTIOUS ORGANISM: Primary | ICD-10-CM

## 2024-04-30 PROCEDURE — 99213 OFFICE O/P EST LOW 20 MIN: CPT | Performed by: FAMILY MEDICINE

## 2024-04-30 RX ORDER — ROSUVASTATIN CALCIUM 10 MG/1
10 TABLET, COATED ORAL DAILY
Qty: 90 TABLET | Refills: 1 | Status: SHIPPED | OUTPATIENT
Start: 2024-04-30

## 2024-04-30 NOTE — PROGRESS NOTES
Patient ID: Gloria Ramirez is a 63 y.o. female.    HPI: 63 y.o.female presents for recheck of left lower lobe pneumonia.  She denies any fever, wheezing, or dyspnea.      SUBJECTIVE    Family History   Problem Relation Age of Onset    Lung cancer Mother     Lung cancer Father     Cancer Father         Urinary Bladder    Thyroid disease Daughter     Heart disease Maternal Grandmother     Heart disease Maternal Grandfather     Heart disease Paternal Grandmother     No Known Problems Paternal Grandfather     No Known Problems Son     Cancer Maternal Aunt         unsure of type    Lung cancer Maternal Aunt     Cancer Paternal Aunt         unsure of type     Social History     Socioeconomic History    Marital status: /Civil Union     Spouse name: Not on file    Number of children: Not on file    Years of education: Not on file    Highest education level: Not on file   Occupational History    Not on file   Tobacco Use    Smoking status: Former     Passive exposure: Past    Smokeless tobacco: Never   Vaping Use    Vaping status: Never Used   Substance and Sexual Activity    Alcohol use: Yes     Comment: rare    Drug use: Never    Sexual activity: Not on file   Other Topics Concern    Not on file   Social History Narrative    Coffee     Social Determinants of Health     Financial Resource Strain: Not on file   Food Insecurity: Not on file   Transportation Needs: Not on file   Physical Activity: Not on file   Stress: Not on file   Social Connections: Not on file   Intimate Partner Violence: Not on file   Housing Stability: Not on file     Past Medical History:   Diagnosis Date    Congenital hip dysplasia     Seasonal allergies      Past Surgical History:   Procedure Laterality Date    HIP SURGERY      OVARIAN CYST REMOVAL      TUBAL LIGATION       No Known Allergies    Current Outpatient Medications:     albuterol (2.5 mg/3 mL) 0.083 % nebulizer solution, Take 3 mL (2.5 mg total) by nebulization every 6 (six) hours  as needed for wheezing or shortness of breath, Disp: 75 mL, Rfl: 0    albuterol (Ventolin HFA) 90 mcg/act inhaler, Inhale 2 puffs every 4 (four) hours as needed for wheezing, Disp: 18 g, Rfl: 0    amoxicillin (AMOXIL) 500 mg capsule, Take 2 capsules (1,000 mg total) by mouth every 8 (eight) hours for 5 days, Disp: 30 capsule, Rfl: 0    azelastine (ASTELIN) 0.1 % nasal spray, 2 sprays into each nostril 2 (two) times a day Use in each nostril as directed, Disp: 30 mL, Rfl: 3    azithromycin (ZITHROMAX) 250 mg tablet, Take 2 tablets today then 1 tablet daily x 4 days, Disp: 6 tablet, Rfl: 0    cholecalciferol (VITAMIN D3) 1,000 units tablet, Take 1,000 Units by mouth daily, Disp: , Rfl:     Cyanocobalamin (VITAMIN B 12 PO), Take by mouth, Disp: , Rfl:     Misc Natural Products (Elderberry/Vitamin C/Zinc) CHEW, Chew, Disp: , Rfl:     multivitamin (THERAGRAN) TABS, Take 1 tablet by mouth daily, Disp: , Rfl:     Omega-3 1000 MG CAPS, Take by mouth, Disp: , Rfl:     predniSONE 20 mg tablet, Take 2 tablets (40 mg total) by mouth daily for 4 days Do not start before April 29, 2024., Disp: 8 tablet, Rfl: 0    rosuvastatin (CRESTOR) 10 MG tablet, TAKE 1 TABLET BY MOUTH DAILY, Disp: 90 tablet, Rfl: 1    TURMERIC PO, Take by mouth, Disp: , Rfl:     promethazine-dextromethorphan (PHENERGAN-DM) 6.25-15 mg/5 mL oral syrup, Take 5 mL by mouth 4 (four) times a day as needed for cough (Patient not taking: Reported on 4/30/2024), Disp: 180 mL, Rfl: 0    Review of Systems  Constitutional:     Denies fever, chills ,fatigue ,weakness ,weight loss, weight gain     ENT: Denies earache ,loss of hearing ,nosebleed, nasal discharge,nasal congestion ,sore throat ,hoarseness  Pulmonary: Denies shortness of breath ,+ dry cough (less) ,dyspnea on exertion, orthopnea  ,PND   Cardiovascular:  Denies bradycardia , tachycardia  ,palpations, lower extremity edema leg, claudication  Breast:  Denies new or changing breast lumps ,nipple discharge ,nipple  "changes  Abdomen:  Denies abdominal pain , anorexia , indigestion, nausea, vomiting, constipation, diarrhea  Musculoskeletal: Denies myalgias, arthralgias, joint swelling, joint stiffness , limb pain, limb swelling  Gu: denies dysuria, polyuria  Skin: Denies skin rash, skin lesion, skin wound, itching, dry skin  Neuro: Denies headache, numbness, tingling, confusion, loss of consciousness, dizziness, vertigo  Psychiatric: Denies feelings of depression, suicidal ideation, anxiety, sleep disturbances    OBJECTIVE  /70 (BP Location: Left arm, Patient Position: Sitting, Cuff Size: Adult)   Pulse 72   Temp (!) 97.2 °F (36.2 °C)   Ht 5' 7\" (1.702 m)   Wt 81.1 kg (178 lb 14.4 oz)   SpO2 96%   BMI 28.02 kg/m²   Constitutional:   NAD, well appearing and well nourished      ENT:   Conjunctiva and lids: no injection, edema, or discharge     Pupils and iris: BABAR bilaterally    External inspection of ears and nose: normal without deformities or discharge.      Otoscopic exam: Canals patent without erythema.       Nasal mucosa, septum and turbinates: Normal or edema or discharge         Oropharynx:  Moist mucosa, normal tongue and tonsils without lesions. No erythema        Pulmonary:Respiratory effort normal rate and rhythm, no increased work of breathing. Auscultation of lungs:  Clear bilaterally with no adventitious breath sounds       Cardiovascular: regular rate and rhythm, S1 and S2, no murmur, no edema and/or varicosities of LE      Abdomen: Soft and non-distended     Positive bowel sounds      No heptomegaly or splenomegaly      Gu: no suprapubic tenderness or CVA tenderness, no urethral discharge  Lymphatic:  No anterior or posterior cervical lymphadenopathy         Musculoskeletal:  Gait and station: Normal gait      Digits and nails normal without clubbing or cyanosis       Inspection/palpation of joints, bones, and muscles:  No joint tenderness, swelling, full active and passive range of motion     "   Skin: Normal skin turgor and no rashes      Neuro:    Normal reflexes     Psych:   alert and oriented to person, place and time     normal mood and affect       Assessment/Plan:Diagnoses and all orders for this visit:    Pneumonia of left lower lobe due to infectious organism  Comments:  Pt to finish antibitoics, steroids and cough meds.        Reviewed with patient plan to treat with above plan.    Patient instructed to call in 72 hours if not feeling better or if symptoms worsen

## 2024-05-03 LAB
BACTERIA BLD CULT: NORMAL
BACTERIA BLD CULT: NORMAL

## 2024-06-13 ENCOUNTER — TELEPHONE (OUTPATIENT)
Dept: ADMINISTRATIVE | Facility: OTHER | Age: 64
End: 2024-06-13

## 2024-06-13 ENCOUNTER — TELEPHONE (OUTPATIENT)
Age: 64
End: 2024-06-13

## 2024-06-13 DIAGNOSIS — Z00.00 ANNUAL PHYSICAL EXAM: Primary | ICD-10-CM

## 2024-06-13 NOTE — TELEPHONE ENCOUNTER
----- Message from Raffy JOSHI sent at 6/12/2024  1:11 PM EDT -----  Regarding: Colonoscopy  06/12/24 1:12 PM    Hello, our patient Gloria Ramirez has had CRC: Colonoscopy completed/performed. Please assist in updating the patient chart by pulling the Care Everywhere (CE) document. The date of service is 06/12/2024.     Thank you,  RAFFY ALMODOVAR  [facility]

## 2024-06-13 NOTE — TELEPHONE ENCOUNTER
Patient called in to reschedule her annual physical. She would like to know if Dr. Monk wants her to have any blood work done prior to the appt. Please advise if orders are placed. Thank you.

## 2024-06-13 NOTE — TELEPHONE ENCOUNTER
Upon review of the In Basket request we were able to locate, review, and update the patient chart as requested for CRC: Colonoscopy.    Any additional questions or concerns should be emailed to the Practice Liaisons via the appropriate education email address, please do not reply via In Basket.    Thank you  Kimberly Jimenez [facility]

## 2024-07-23 ENCOUNTER — OFFICE VISIT (OUTPATIENT)
Dept: CARDIOLOGY CLINIC | Facility: CLINIC | Age: 64
End: 2024-07-23
Payer: COMMERCIAL

## 2024-07-23 ENCOUNTER — DOCUMENTATION (OUTPATIENT)
Dept: CARDIOLOGY CLINIC | Facility: CLINIC | Age: 64
End: 2024-07-23

## 2024-07-23 VITALS
HEIGHT: 67 IN | HEART RATE: 82 BPM | BODY MASS INDEX: 28.41 KG/M2 | OXYGEN SATURATION: 96 % | WEIGHT: 181 LBS | DIASTOLIC BLOOD PRESSURE: 74 MMHG | SYSTOLIC BLOOD PRESSURE: 106 MMHG

## 2024-07-23 DIAGNOSIS — R60.0 PEDAL EDEMA: ICD-10-CM

## 2024-07-23 DIAGNOSIS — R73.03 PRE-DIABETES: ICD-10-CM

## 2024-07-23 DIAGNOSIS — R06.09 DOE (DYSPNEA ON EXERTION): ICD-10-CM

## 2024-07-23 DIAGNOSIS — R07.9 ACUTE CHEST PAIN: Primary | ICD-10-CM

## 2024-07-23 DIAGNOSIS — E78.5 DYSLIPIDEMIA: ICD-10-CM

## 2024-07-23 PROCEDURE — 93000 ELECTROCARDIOGRAM COMPLETE: CPT | Performed by: INTERNAL MEDICINE

## 2024-07-23 PROCEDURE — 99205 OFFICE O/P NEW HI 60 MIN: CPT | Performed by: INTERNAL MEDICINE

## 2024-07-23 RX ORDER — SODIUM PICOSULFATE, MAGNESIUM OXIDE, AND ANHYDROUS CITRIC ACID 12; 3.5; 1 G/175ML; G/175ML; MG/175ML
LIQUID ORAL
COMMUNITY
Start: 2024-06-06

## 2024-07-23 NOTE — LETTER
July 23, 2024     Marge Monk DO  [address]  [address]  [facility] [address]    Patient: Gloria Raimrez   YOB: 1960   Date of Visit: 7/23/2024       Dear Dr. Nnamdi Monk:    Thank you for referring Gloria Ramirez to me for evaluation. Below are my notes for this consultation.    If you have questions, please do not hesitate to call me. I look forward to following your patient along with you.         Sincerely,        Rajesh Quinn MD        CC: No Recipients    Rajesh Quinn MD  7/23/2024 11:52 AM  Sign when Signing Visit  Cardiology   Gloria Ramirez 64 y.o. female MRN: [id number]   Encounter: [id number]        Reason for Consult / Principal Problem: Chest pain    Physician Requesting Consult: Marge Monk DO    PCP: Marge Monk DO        Assessment/Plan:    >> Chest pain  >> Dyspnea on exertion  >> Dyslipidemia  >> Pedal edema  >> Prediabetes  >> Fatty liver previously noted on CT (August 2021)      Plan:    -Check exercise stress test to rule out ischemia  -Check echocardiogram to rule out structural heart disease  -Check coronary calcium score to further risk stratify  -Check cardiac BNP  -Repeat HbA1c  -Check lipid panel with direct LDL  -Return to office in 6 weeks    CC: Chest pain, dyspnea on exertion    HPI  64 y.o. female presents with worsening dyspnea on exertion over the last several months.  She also has a pressure-like sensation over her chest that is nonradiating, 5 out of 10 in intensity, not associated with nausea vomiting or diaphoresis.  Can occur at any time.    She feels like she gets short of breath very easily.    She had a CT scan in 2021 at [facility] that showed aortic atherosclerosis.    She has a family history of premature CAD in her brothers.    She does complain of mild pedal edema bilaterally.      The patient denies palpitations, orthopnea, PND,  syncope, presyncope, diaphoresis, nausea/vomiting       The 10-year  "ASCVD risk score (Berny ALANIS, et al., 2019) is: 4.1%    Values used to calculate the score:      Age: 64 years      Sex: Female      Is Non- : No      Diabetic: No      Tobacco smoker: No      Systolic Blood Pressure: 106 mmHg      Is BP treated: No      HDL Cholesterol: 53 mg/dL      Total Cholesterol: 293 mg/dL            Physical exam  Objective  Vitals: Blood pressure 106/74, pulse 82, height 5' 7\" (1.702 m), weight 82.1 kg (181 lb), SpO2 96%.    General:  AO x3, no acute distress  Cardiac:  S1-S2 normal,  No murmurs. No rubs or gallops, JVP: normal  Lungs:  Clear to auscultation bilaterally, no wheezing or crackles.  Abdomen:  Soft, nontender, nondistended.  Extremities:  Warm, well perfused, pulses palpable, no ulcers or rashes. Edema:absent  Neuro: Grossly nonfocal    CT chest abdomen pelvis: 8/11/2021:    Impression:   1. No acute abnormality in the abdomen or pelvis.   2. Fatty infiltration of the liver.   3. Mild sigmoid diverticulosis.     ======================================================  TREADMILL STRESS  No results found for this or any previous visit.     ----------------------------------------------------------------------------------------------  NUCLEAR STRESS TEST: No results found for this or any previous visit.    No results found for this or any previous visit.      --------------------------------------------------------------------------------  CATH:  No results found for this or any previous visit.    --------------------------------------------------------------------------------  ECHO:   No results found for this or any previous visit.    No results found for this or any previous visit.    --------------------------------------------------------------------------------  HOLTER  No results found for this or any previous visit.    --------------------------------------------------------------------------------  CAROTIDS  No results found for this or any previous " "visit.       Diagnoses and all orders for this visit:    Acute chest pain  -     Ambulatory Referral to Cardiology  -     POCT ECG    Other orders  -     ELDERBERRY-VITAMIN C-ZINC PO; Take by mouth  -     Clenpiq oral solution; PLEASE FOLLOW DIRECTIONS ON SHEET GIVEN (Patient not taking: Reported on 7/23/2024)       ======================================================          Review of Systems  ROS as noted above, otherwise 12 point review of systems was performed and is negative.     Historical Information  Past Medical History:   Diagnosis Date   • Congenital hip dysplasia    • Seasonal allergies      Past Surgical History:   Procedure Laterality Date   • HIP SURGERY     • OVARIAN CYST REMOVAL     • TUBAL LIGATION       Social History     Substance and Sexual Activity   Alcohol Use Yes    Comment: rare     Social History     Substance and Sexual Activity   Drug Use Never     Social History     Tobacco Use   Smoking Status Former   • Passive exposure: Past   Smokeless Tobacco Never     Family History   Problem Relation Age of Onset   • Lung cancer Mother    • Lung cancer Father    • Cancer Father         Urinary Bladder   • Thyroid disease Daughter    • Heart disease Maternal Grandmother    • Heart disease Maternal Grandfather    • Heart disease Paternal Grandmother    • No Known Problems Paternal Grandfather    • No Known Problems Son    • Cancer Maternal Aunt         unsure of type   • Lung cancer Maternal Aunt    • Cancer Paternal Aunt         unsure of type       Meds/Allergies  Hospital Medications:   No current facility-administered medications for this visit.     Home Medications: Not in a hospital admission.    No Known Allergies      Portions of the record may have been created with voice recognition software.  Occasional wrong words or \"sound a like\" substitutions may have occurred due to the inherent limitations of voice recognition software.  Read the chart carefully and recognize, using context, " where substitutions have occurred.        I have spent a total of 63 min in reviewing and/or ordering tests, medications, or procedures, performing an examination or evaluation, reviewing pertinent history, counseling and educating the patient, referring and/or communicating with other health care professionals, documenting in the EMR and general coordination of care of the patient today.

## 2024-07-23 NOTE — PROGRESS NOTES
"Cardiology   Gloria Ramirez 64 y.o. female MRN: [id number]   Encounter: [id number]        Reason for Consult / Principal Problem: Chest pain    Physician Requesting Consult: Marge Monk DO    PCP: Marge Monk DO        Assessment/Plan:    >> Chest pain  >> Dyspnea on exertion  >> Dyslipidemia  >> Pedal edema  >> Prediabetes  >> Fatty liver previously noted on CT (August 2021)      Plan:    -Check exercise stress test to rule out ischemia  -Check echocardiogram to rule out structural heart disease  -Check coronary calcium score to further risk stratify  -Check cardiac BNP  -Repeat HbA1c  -Check lipid panel with direct LDL  -Return to office in 6 weeks    CC: Chest pain, dyspnea on exertion    HPI  64 y.o. female presents with worsening dyspnea on exertion over the last several months.  She also has a pressure-like sensation over her chest that is nonradiating, 5 out of 10 in intensity, not associated with nausea vomiting or diaphoresis.  Can occur at any time.    She feels like she gets short of breath very easily.    She had a CT scan in 2021 at [facility] that showed aortic atherosclerosis.    She has a family history of premature CAD in her brothers.    She does complain of mild pedal edema bilaterally.      The patient denies palpitations, orthopnea, PND,  syncope, presyncope, diaphoresis, nausea/vomiting       The 10-year ASCVD risk score (Berny DK, et al., 2019) is: 4.1%    Values used to calculate the score:      Age: 64 years      Sex: Female      Is Non- : No      Diabetic: No      Tobacco smoker: No      Systolic Blood Pressure: 106 mmHg      Is BP treated: No      HDL Cholesterol: 53 mg/dL      Total Cholesterol: 293 mg/dL            Physical exam  Objective   Vitals: Blood pressure 106/74, pulse 82, height 5' 7\" (1.702 m), weight 82.1 kg (181 lb), SpO2 96%.    General:  AO x3, no acute distress  Cardiac:  S1-S2 normal,  No murmurs. No rubs or gallops, " JVP: normal  Lungs:  Clear to auscultation bilaterally, no wheezing or crackles.  Abdomen:  Soft, nontender, nondistended.  Extremities:  Warm, well perfused, pulses palpable, no ulcers or rashes. Edema:absent  Neuro: Grossly nonfocal    CT chest abdomen pelvis: 8/11/2021:    Impression:   1. No acute abnormality in the abdomen or pelvis.   2. Fatty infiltration of the liver.   3. Mild sigmoid diverticulosis.     ======================================================  TREADMILL STRESS  No results found for this or any previous visit.     ----------------------------------------------------------------------------------------------  NUCLEAR STRESS TEST: No results found for this or any previous visit.    No results found for this or any previous visit.      --------------------------------------------------------------------------------  CATH:  No results found for this or any previous visit.    --------------------------------------------------------------------------------  ECHO:   No results found for this or any previous visit.    No results found for this or any previous visit.    --------------------------------------------------------------------------------  HOLTER  No results found for this or any previous visit.    --------------------------------------------------------------------------------  CAROTIDS  No results found for this or any previous visit.       Diagnoses and all orders for this visit:    Acute chest pain  -     Ambulatory Referral to Cardiology  -     POCT ECG    Other orders  -     ELDERBERRY-VITAMIN C-ZINC PO; Take by mouth  -     Clenpiq oral solution; PLEASE FOLLOW DIRECTIONS ON SHEET GIVEN (Patient not taking: Reported on 7/23/2024)       ======================================================          Review of Systems  ROS as noted above, otherwise 12 point review of systems was performed and is negative.     Historical Information   Past Medical History:   Diagnosis Date    Congenital  "hip dysplasia     Seasonal allergies      Past Surgical History:   Procedure Laterality Date    HIP SURGERY      OVARIAN CYST REMOVAL      TUBAL LIGATION       Social History     Substance and Sexual Activity   Alcohol Use Yes    Comment: rare     Social History     Substance and Sexual Activity   Drug Use Never     Social History     Tobacco Use   Smoking Status Former    Passive exposure: Past   Smokeless Tobacco Never     Family History   Problem Relation Age of Onset    Lung cancer Mother     Lung cancer Father     Cancer Father         Urinary Bladder    Thyroid disease Daughter     Heart disease Maternal Grandmother     Heart disease Maternal Grandfather     Heart disease Paternal Grandmother     No Known Problems Paternal Grandfather     No Known Problems Son     Cancer Maternal Aunt         unsure of type    Lung cancer Maternal Aunt     Cancer Paternal Aunt         unsure of type       Meds/Allergies   Hospital Medications:   No current facility-administered medications for this visit.     Home Medications: Not in a hospital admission.    No Known Allergies      Portions of the record may have been created with voice recognition software.  Occasional wrong words or \"sound a like\" substitutions may have occurred due to the inherent limitations of voice recognition software.  Read the chart carefully and recognize, using context, where substitutions have occurred.        I have spent a total of 63 min in reviewing and/or ordering tests, medications, or procedures, performing an examination or evaluation, reviewing pertinent history, counseling and educating the patient, referring and/or communicating with other health care professionals, documenting in the EMR and general coordination of care of the patient today.              "

## 2024-07-29 ENCOUNTER — APPOINTMENT (OUTPATIENT)
Dept: LAB | Facility: CLINIC | Age: 64
End: 2024-07-29
Payer: COMMERCIAL

## 2024-07-29 DIAGNOSIS — R73.03 PRE-DIABETES: ICD-10-CM

## 2024-07-29 DIAGNOSIS — R06.09 DOE (DYSPNEA ON EXERTION): ICD-10-CM

## 2024-07-29 DIAGNOSIS — E78.5 DYSLIPIDEMIA: ICD-10-CM

## 2024-07-29 LAB
BNP SERPL-MCNC: 39 PG/ML (ref 0–100)
CHOLEST SERPL-MCNC: 196 MG/DL
EST. AVERAGE GLUCOSE BLD GHB EST-MCNC: 123 MG/DL
HBA1C MFR BLD: 5.9 %
HDLC SERPL-MCNC: 56 MG/DL
LDLC SERPL CALC-MCNC: 100 MG/DL (ref 0–100)
TRIGL SERPL-MCNC: 200 MG/DL

## 2024-07-29 PROCEDURE — 83036 HEMOGLOBIN GLYCOSYLATED A1C: CPT

## 2024-07-29 PROCEDURE — 83880 ASSAY OF NATRIURETIC PEPTIDE: CPT

## 2024-07-29 PROCEDURE — 80061 LIPID PANEL: CPT

## 2024-07-29 PROCEDURE — 36415 COLL VENOUS BLD VENIPUNCTURE: CPT

## 2024-08-05 ENCOUNTER — HOSPITAL ENCOUNTER (OUTPATIENT)
Dept: CT IMAGING | Facility: HOSPITAL | Age: 64
Discharge: HOME | End: 2024-08-05
Attending: INTERNAL MEDICINE
Payer: COMMERCIAL

## 2024-08-05 DIAGNOSIS — E78.5 DYSLIPIDEMIA: ICD-10-CM

## 2024-08-05 PROCEDURE — 75571 CT HRT W/O DYE W/CA TEST: CPT

## 2024-08-06 ENCOUNTER — OFFICE VISIT (OUTPATIENT)
Dept: FAMILY MEDICINE CLINIC | Facility: CLINIC | Age: 64
End: 2024-08-06
Payer: COMMERCIAL

## 2024-08-06 VITALS
BODY MASS INDEX: 28.79 KG/M2 | HEIGHT: 67 IN | OXYGEN SATURATION: 95 % | SYSTOLIC BLOOD PRESSURE: 112 MMHG | WEIGHT: 183.4 LBS | DIASTOLIC BLOOD PRESSURE: 72 MMHG | HEART RATE: 88 BPM | TEMPERATURE: 97.6 F

## 2024-08-06 DIAGNOSIS — Z00.00 ANNUAL PHYSICAL EXAM: Primary | ICD-10-CM

## 2024-08-06 PROCEDURE — 99396 PREV VISIT EST AGE 40-64: CPT | Performed by: FAMILY MEDICINE

## 2024-08-06 NOTE — PROGRESS NOTES
Adult Annual Physical  Name: Gloria Ramirez      : 1960      MRN: [id number]  Encounter Provider: Marge Monk DO  Encounter Date: 2024   Encounter department: [facility]    Assessment & Plan     Immunizations and preventive care screenings were discussed with patient today. Appropriate education was printed on patient's after visit summary.    Counseling:  Exercise: the importance of regular exercise/physical activity was discussed. Recommend exercise 3-5 times per week for at least 30 minutes.          History of Present Illness     Adult Annual Physical:  Patient presents for annual physical.     Diet and Physical Activity:  - Diet/Nutrition: well balanced diet.  - Exercise: moderate cardiovascular exercise.    Depression Screening:  - PHQ-2 Score: 0    General Health:  - Sleep: sleeps well.  - Hearing: normal hearing bilateral ears.  - Vision: no vision problems.  - Dental: regular dental visits.    /GYN Health:  - Follows with GYN: no.   - Menopause: postmenopausal.   - History of STDs: no  - Contraception: menopause.      Advanced Care Planning:  - Has an advanced directive?: no    - Has a durable medical POA?: no    - ACP document given to patient?: yes      Review of Systems   Constitutional:  Negative for appetite change, chills and fever.   HENT:  Negative for ear pain, facial swelling, rhinorrhea, sinus pain, sore throat and trouble swallowing.    Eyes:  Negative for discharge and redness.   Respiratory:  Negative for chest tightness, shortness of breath and wheezing.    Cardiovascular:  Negative for chest pain and palpitations.   Gastrointestinal:  Negative for abdominal pain, diarrhea, nausea and vomiting.   Endocrine: Negative for polyuria.   Genitourinary:  Negative for dysuria and urgency.   Musculoskeletal:  Negative for arthralgias and back pain.   Skin:  Negative for rash.   Neurological:  Negative for dizziness, weakness and headaches.    Hematological:  Negative for adenopathy.   Psychiatric/Behavioral:  Negative for behavioral problems, confusion and sleep disturbance.    All other systems reviewed and are negative.    Pertinent Medical History   Asthma is well controlled, hypercholesterolemia        Objective     There were no vitals taken for this visit.    Physical Exam  Vitals and nursing note reviewed.   Constitutional:       General: She is not in acute distress.     Appearance: Normal appearance. She is well-developed. She is not ill-appearing or diaphoretic.   HENT:      Head: Normocephalic and atraumatic.      Right Ear: Tympanic membrane, ear canal and external ear normal.      Left Ear: Tympanic membrane, ear canal and external ear normal.      Nose: Nose normal. No congestion or rhinorrhea.      Mouth/Throat:      Mouth: Mucous membranes are moist.      Pharynx: Oropharynx is clear. No oropharyngeal exudate or posterior oropharyngeal erythema.   Eyes:      General: No scleral icterus.        Right eye: No discharge.         Left eye: No discharge.      Extraocular Movements: Extraocular movements intact.      Conjunctiva/sclera: Conjunctivae normal.      Pupils: Pupils are equal, round, and reactive to light.   Neck:      Thyroid: No thyromegaly.      Vascular: No carotid bruit or JVD.      Trachea: No tracheal deviation.   Cardiovascular:      Rate and Rhythm: Normal rate and regular rhythm.      Pulses: Normal pulses.      Heart sounds: Normal heart sounds. No murmur heard.  Pulmonary:      Effort: Pulmonary effort is normal. No respiratory distress.      Breath sounds: Normal breath sounds. No stridor. No wheezing, rhonchi or rales.   Abdominal:      General: Abdomen is flat. Bowel sounds are normal. There is no distension.      Palpations: Abdomen is soft. There is no mass.      Tenderness: There is no abdominal tenderness. There is no guarding or rebound.   Musculoskeletal:         General: No swelling, tenderness or deformity.  Normal range of motion.      Cervical back: Normal range of motion and neck supple. No rigidity.      Right lower leg: No edema.      Left lower leg: No edema.   Lymphadenopathy:      Cervical: No cervical adenopathy.   Skin:     General: Skin is warm and dry.      Capillary Refill: Capillary refill takes less than 2 seconds.      Coloration: Skin is not jaundiced.      Findings: No bruising, erythema or rash.   Neurological:      General: No focal deficit present.      Mental Status: She is alert and oriented to person, place, and time.      Cranial Nerves: No cranial nerve deficit.      Sensory: No sensory deficit.      Motor: No abnormal muscle tone.      Coordination: Coordination normal.      Gait: Gait normal.      Deep Tendon Reflexes: Reflexes are normal and symmetric. Reflexes normal.   Psychiatric:         Mood and Affect: Mood normal.         Behavior: Behavior normal.         Thought Content: Thought content normal.         Judgment: Judgment normal.       Administrative Statements   I have spent a total time of 20 minutes in caring for this patient on the day of the visit/encounter including Diagnostic results, Prognosis, Risks and benefits of tx options, Instructions for management, Patient and family education, and Importance of tx compliance.

## 2024-08-06 NOTE — PATIENT INSTRUCTIONS
"Patient Education     Routine physical for adults   The Basics   Written by the doctors and editors at [facility]   What is a physical? -- A physical is a routine visit, or \"check-up,\" with your doctor. You might also hear it called a \"wellness visit\" or \"preventive visit.\"  During each visit, the doctor will:   Ask about your physical and mental health   Ask about your habits, behaviors, and lifestyle   Do an exam   Give you vaccines if needed   Talk to you about any medicines you take   Give advice about your health   Answer your questions  Getting regular check-ups is an important part of taking care of your health. It can help your doctor find and treat any problems you have. But it's also important for preventing health problems.  A routine physical is different from a \"sick visit.\" A sick visit is when you see a doctor because of a health concern or problem. Since physicals are scheduled ahead of time, you can think about what you want to ask the doctor.  How often should I get a physical? -- It depends on your age and health. In general, for people age 21 years and older:   If you are younger than 50 years, you might be able to get a physical every 3 years.   If you are 50 years or older, your doctor might recommend a physical every year.  If you have an ongoing health condition, like diabetes or high blood pressure, your doctor will probably want to see you more often.  What happens during a physical? -- In general, each visit will include:   Physical exam - The doctor or nurse will check your height, weight, heart rate, and blood pressure. They will also look at your eyes and ears. They will ask about how you are feeling and whether you have any symptoms that bother you.   Medicines - It's a good idea to bring a list of all the medicines you take to each doctor visit. Your doctor will talk to you about your medicines and answer any questions. Tell them if you are having any side effects that bother you. You " "should also tell them if you are having trouble paying for any of your medicines.   Habits and behaviors - This includes:   Your diet   Your exercise habits   Whether you smoke, drink alcohol, or use drugs   Whether you are sexually active   Whether you feel safe at home  Your doctor will talk to you about things you can do to improve your health and lower your risk of health problems. They will also offer help and support. For example, if you want to quit smoking, they can give you advice and might prescribe medicines. If you want to improve your diet or get more physical activity, they can help you with this, too.   Lab tests, if needed - The tests you get will depend on your age and situation. For example, your doctor might want to check your:   Cholesterol   Blood sugar   Iron level   Vaccines - The recommended vaccines will depend on your age, health, and what vaccines you already had. Vaccines are very important because they can prevent certain serious or deadly infections.   Discussion of screening - \"Screening\" means checking for diseases or other health problems before they cause symptoms. Your doctor can recommend screening based on your age, risk, and preferences. This might include tests to check for:   Cancer, such as breast, prostate, cervical, ovarian, colorectal, prostate, lung, or skin cancer   Sexually transmitted infections, such as chlamydia and gonorrhea   Mental health conditions like depression and anxiety  Your doctor will talk to you about the different types of screening tests. They can help you decide which screenings to have. They can also explain what the results might mean.   Answering questions - The physical is a good time to ask the doctor or nurse questions about your health. If needed, they can refer you to other doctors or specialists, too.  Adults older than 65 years often need other care, too. As you get older, your doctor will talk to you about:   How to prevent falling at " home   Hearing or vision tests   Memory testing   How to take your medicines safely   Making sure that you have the help and support you need at home  All topics are updated as new evidence becomes available and our peer review process is complete.  This topic retrieved from [employer] on: May 02, 2024.  Topic 418046 Version 1.0  Release: 32.4.3 - C32.122  © 2024 UpToDate, Inc. and/or its affiliates. All rights reserved.  Consumer Information Use and Disclaimer   Disclaimer: This generalized information is a limited summary of diagnosis, treatment, and/or medication information. It is not meant to be comprehensive and should be used as a tool to help the user understand and/or assess potential diagnostic and treatment options. It does NOT include all information about conditions, treatments, medications, side effects, or risks that may apply to a specific patient. It is not intended to be medical advice or a substitute for the medical advice, diagnosis, or treatment of a health care provider based on the health care provider's examination and assessment of a patient's specific and unique circumstances. Patients must speak with a health care provider for complete information about their health, medical questions, and treatment options, including any risks or benefits regarding use of medications. This information does not endorse any treatments or medications as safe, effective, or approved for treating a specific patient. UpToDate, Inc. and its affiliates disclaim any warranty or liability relating to this information or the use thereof.The use of this information is governed by the Terms of Use, available at https://www.woltersFlybitsuwer.com/en/know/clinical-effectiveness-terms. 2024© UpToDate, Inc. and its affiliates and/or licensors. All rights reserved.  Copyright   © 2024 UpToDate, Inc. and/or its affiliates. All rights reserved.

## 2024-08-13 ENCOUNTER — TELEPHONE (OUTPATIENT)
Dept: CARDIOLOGY CLINIC | Facility: CLINIC | Age: 64
End: 2024-08-13

## 2024-08-22 ENCOUNTER — HOSPITAL ENCOUNTER (OUTPATIENT)
Dept: NON INVASIVE DIAGNOSTICS | Facility: CLINIC | Age: 64
Discharge: HOME | End: 2024-08-22
Payer: COMMERCIAL

## 2024-08-22 VITALS
DIASTOLIC BLOOD PRESSURE: 72 MMHG | SYSTOLIC BLOOD PRESSURE: 112 MMHG | HEART RATE: 88 BPM | HEIGHT: 67 IN | BODY MASS INDEX: 28.72 KG/M2 | WEIGHT: 183 LBS

## 2024-08-22 DIAGNOSIS — R06.09 DOE (DYSPNEA ON EXERTION): ICD-10-CM

## 2024-08-22 DIAGNOSIS — R07.9 ACUTE CHEST PAIN: ICD-10-CM

## 2024-08-22 DIAGNOSIS — R60.0 PEDAL EDEMA: ICD-10-CM

## 2024-08-22 LAB
AORTIC ROOT: 4.2 CM
ASCENDING AORTA: 3.8 CM
BSA FOR ECHO PROCEDURE: 1.95 M2
E WAVE DECELERATION TIME: 249 MS
E/A RATIO: 0.7
FRACTIONAL SHORTENING: 36 (ref 28–44)
INTERVENTRICULAR SEPTUM IN DIASTOLE (PARASTERNAL SHORT AXIS VIEW): 1.2 CM
INTERVENTRICULAR SEPTUM: 1.2 CM (ref 0.6–1.1)
IVC: 19 MM
LAAS-AP2: 14.8 CM2
LAAS-AP4: 13 CM2
LEFT ATRIUM SIZE: 3.4 CM
LEFT ATRIUM VOLUME (MOD BIPLANE): 29 ML
LEFT ATRIUM VOLUME INDEX (MOD BIPLANE): 14.9 ML/M2
LEFT INTERNAL DIMENSION IN SYSTOLE: 2.5 CM (ref 2.1–4)
LEFT VENTRICULAR INTERNAL DIMENSION IN DIASTOLE: 3.9 CM (ref 3.5–6)
LEFT VENTRICULAR POSTERIOR WALL IN END DIASTOLE: 1.2 CM
LEFT VENTRICULAR STROKE VOLUME: 43 ML
LVSV (TEICH): 43 ML
MAX HR PERCENT: 87 %
MAX HR: 137 BPM
MV E'TISSUE VEL-LAT: 14 CM/S
MV E'TISSUE VEL-SEP: 12 CM/S
MV PEAK A VEL: 1.17 M/S
MV PEAK E VEL: 82 CM/S
MV STENOSIS PRESSURE HALF TIME: 72 MS
MV VALVE AREA P 1/2 METHOD: 3.06
RA PRESSURE ESTIMATED: 3 MMHG
RATE PRESSURE PRODUCT: NORMAL
RIGHT ATRIUM AREA SYSTOLE A4C: 13.3 CM2
RIGHT VENTRICLE ID DIMENSION: 3.1 CM
RV PSP: 25 MMHG
SINOTUBULAR JUNCTION: 3.6 CM
SL CV LEFT ATRIUM LENGTH A2C: 5.4 CM
SL CV LV EF: 60
SL CV PED ECHO LEFT VENTRICLE DIASTOLIC VOLUME (MOD BIPLANE) 2D: 65 ML
SL CV PED ECHO LEFT VENTRICLE SYSTOLIC VOLUME (MOD BIPLANE) 2D: 22 ML
SL CV SINUS OF VALSALVA 2D: 4.1 CM
SL CV STRESS RECOVERY BP: NORMAL MMHG
SL CV STRESS RECOVERY HR: 88 BPM
SL CV STRESS RECOVERY O2 SAT: 97 %
SL CV STRESS STAGE REACHED: 3
STJ: 3.6 CM
STRESS ANGINA INDEX: 0
STRESS BASELINE BP: NORMAL MMHG
STRESS BASELINE HR: 75 BPM
STRESS DUKE TREADMILL SCORE: 8
STRESS O2 SAT REST: 98 %
STRESS PEAK HR: 120 BPM
STRESS POST ESTIMATED WORKLOAD: 10.1 METS
STRESS POST EXERCISE DUR MIN: 8 MIN
STRESS POST EXERCISE DUR SEC: 0 SEC
STRESS POST O2 SAT PEAK: 96 %
STRESS POST PEAK BP: 168 MMHG
STRESS ST DEPRESSION: 0 MM
TR MAX PG: 22 MMHG
TR PEAK VELOCITY: 2.4 M/S
TRICUSPID ANNULAR PLANE SYSTOLIC EXCURSION: 2.7 CM
TRICUSPID VALVE PEAK REGURGITATION VELOCITY: 2.35 M/S

## 2024-08-22 PROCEDURE — 93306 TTE W/DOPPLER COMPLETE: CPT

## 2024-08-22 PROCEDURE — 93017 CV STRESS TEST TRACING ONLY: CPT

## 2024-08-23 LAB
CHEST PAIN STATEMENT: NORMAL
MAX DIASTOLIC BP: 84 MMHG
MAX PREDICTED HEART RATE: 157 BPM
PROTOCOL NAME: NORMAL
REASON FOR TERMINATION: NORMAL
STRESS POST EXERCISE DUR MIN: 8 MIN
STRESS POST EXERCISE DUR SEC: 0 SEC
STRESS POST PEAK HR: 137 BPM
STRESS POST PEAK SYSTOLIC BP: 168 MMHG
TARGET HR FORMULA: NORMAL
TEST INDICATION: NORMAL

## 2024-08-26 ENCOUNTER — TELEPHONE (OUTPATIENT)
Dept: CARDIOLOGY CLINIC | Facility: CLINIC | Age: 64
End: 2024-08-26

## 2024-08-26 DIAGNOSIS — I77.810 ASCENDING AORTA DILATION (HCC): Primary | ICD-10-CM

## 2024-08-26 NOTE — TELEPHONE ENCOUNTER
----- Message from Rajesh Quinn MD sent at 8/26/2024  1:23 PM EDT -----  Echo was normal. Aorta is mildly dilated, we can do an echo in 6 months to ensure it's stable. I have ordered it.  ----- Message -----  From: Myranda Vizcarra MD  Sent: 8/22/2024   3:05 PM EDT  To: Rajesh Quinn MD

## 2024-09-19 ENCOUNTER — OFFICE VISIT (OUTPATIENT)
Dept: CARDIOLOGY CLINIC | Facility: CLINIC | Age: 64
End: 2024-09-19
Payer: COMMERCIAL

## 2024-09-19 VITALS
HEART RATE: 92 BPM | WEIGHT: 183.8 LBS | DIASTOLIC BLOOD PRESSURE: 72 MMHG | OXYGEN SATURATION: 94 % | BODY MASS INDEX: 28.85 KG/M2 | HEIGHT: 67 IN | SYSTOLIC BLOOD PRESSURE: 126 MMHG

## 2024-09-19 DIAGNOSIS — E78.9 LIPID DISORDER: Primary | ICD-10-CM

## 2024-09-19 DIAGNOSIS — E66.09 OBESITY DUE TO EXCESS CALORIES WITHOUT SERIOUS COMORBIDITY, UNSPECIFIED CLASSIFICATION: ICD-10-CM

## 2024-09-19 DIAGNOSIS — I77.810 ASCENDING AORTA DILATION (HCC): ICD-10-CM

## 2024-09-19 PROCEDURE — 99213 OFFICE O/P EST LOW 20 MIN: CPT | Performed by: INTERNAL MEDICINE

## 2024-09-19 PROCEDURE — 93000 ELECTROCARDIOGRAM COMPLETE: CPT | Performed by: INTERNAL MEDICINE

## 2024-09-19 RX ORDER — UREA 10 %
500 LOTION (ML) TOPICAL
COMMUNITY

## 2024-09-19 NOTE — PROGRESS NOTES
Cardiology   Gloria Ramirez 64 y.o. female MRN: [id number]   Encounter: [id number]        Assessment:    >> Chest pain  >> Dyspnea on exertion: Likely due to deconditioning as her exercise capacity not impaired on exercise stress test  >> Dyslipidemia  >> Pedal edema  >> Prediabetes  >> Fatty liver previously noted on CT (August 2021)  >> Incidentally detected aortic root dilation      Plan:    -Echo and stress were unremarkable  -Calcium score was 0, however she has a family history of premature CAD and is a former smoker therefore will continue rosuvastatin  -Cardiac BNP was unremarkable she does not have any significant pedal edema at this time  -Return in 6 to 8 months with repeat lipid panel  -Will refer to weight management at patient request  -Repeat limited echo to evaluate aortic root prior to next visit  -She will follow-up with her primary care physician for pulmonary workup for her shortness of breath        9/19/2024: Still complains of mild shortness of breath on exertion, overall is doing well, her cardiac workup including echocardiogram, stress test, coronary calcium score were all unremarkable    CC: Chest pain, dyspnea on exertion    HPI  64 y.o. female presents with worsening dyspnea on exertion over the last several months.  She also has a pressure-like sensation over her chest that is nonradiating, 5 out of 10 in intensity, not associated with nausea vomiting or diaphoresis.  Can occur at any time.    She feels like she gets short of breath very easily.    She had a CT scan in 2021 at [facility] that showed aortic atherosclerosis.    She has a family history of premature CAD in her brothers.    She does complain of mild pedal edema bilaterally.      The patient denies palpitations, orthopnea, PND,  syncope, presyncope, diaphoresis, nausea/vomiting       The 10-year ASCVD risk score (Berny ALANIS, et al., 2019) is: 4.3%    Values used to calculate the score:      Age: 64 years      Sex: Female    "   Is Non- : No      Diabetic: No      Tobacco smoker: No      Systolic Blood Pressure: 126 mmHg      Is BP treated: No      HDL Cholesterol: 56 mg/dL      Total Cholesterol: 196 mg/dL            Physical exam  Objective   Vitals: Blood pressure 126/72, pulse 92, height 5' 7\" (1.702 m), weight 83.4 kg (183 lb 12.8 oz), SpO2 94%.    General:  AO x3, no acute distress  Cardiac:  S1-S2 normal,  No murmurs. No rubs or gallops, JVP: normal  Lungs:  Clear to auscultation bilaterally, no wheezing or crackles.  Abdomen:  Soft, nontender, nondistended.  Extremities:  Warm, well perfused, pulses palpable, no ulcers or rashes. Edema:absent  Neuro: Grossly nonfocal    CT chest abdomen pelvis: 8/11/2021:    Impression:   1. No acute abnormality in the abdomen or pelvis.   2. Fatty infiltration of the liver.   3. Mild sigmoid diverticulosis.     ======================================================  TREADMILL STRESS  No results found for this or any previous visit.     ----------------------------------------------------------------------------------------------  NUCLEAR STRESS TEST: No results found for this or any previous visit.    No results found for this or any previous visit.      --------------------------------------------------------------------------------  CATH:  No results found for this or any previous visit.    --------------------------------------------------------------------------------  ECHO:   No results found for this or any previous visit.    No results found for this or any previous visit.    --------------------------------------------------------------------------------  HOLTER  No results found for this or any previous visit.    --------------------------------------------------------------------------------  CAROTIDS  No results found for this or any previous visit.       Diagnoses and all orders for this visit:    Lipid disorder  -     POCT ECG  -     Lipid Panel With Direct " "LDL; Future    Obesity due to excess calories without serious comorbidity, unspecified classification  -     Ambulatory Referral to Weight Management; Future  -     Lipid Panel With Direct LDL; Future    Ascending aorta dilation (HCC)  -     Echo follow up/limited w/ contrast if indicated; Future    Other orders  -     magnesium gluconate (MAGONATE) 500 mg tablet; Take 500 mg by mouth daily at bedtime       ======================================================          Review of Systems  ROS as noted above, otherwise 12 point review of systems was performed and is negative.     Historical Information   Past Medical History:   Diagnosis Date    Congenital hip dysplasia     Seasonal allergies      Past Surgical History:   Procedure Laterality Date    HIP SURGERY      OVARIAN CYST REMOVAL      TUBAL LIGATION       Social History     Substance and Sexual Activity   Alcohol Use Yes    Comment: rare     Social History     Substance and Sexual Activity   Drug Use Never     Social History     Tobacco Use   Smoking Status Former    Passive exposure: Past   Smokeless Tobacco Never     Family History   Problem Relation Age of Onset    Lung cancer Mother     Lung cancer Father     Cancer Father         Urinary Bladder    Thyroid disease Daughter     Heart disease Maternal Grandmother     Heart disease Maternal Grandfather     Heart disease Paternal Grandmother     No Known Problems Paternal Grandfather     No Known Problems Son     Cancer Maternal Aunt         unsure of type    Lung cancer Maternal Aunt     Cancer Paternal Aunt         unsure of type       Meds/Allergies   Hospital Medications:   No current facility-administered medications for this visit.    Home Medications: Not in a hospital admission.    No Known Allergies      Portions of the record may have been created with voice recognition software.  Occasional wrong words or \"sound a like\" substitutions may have occurred due to the inherent limitations of voice " recognition software.  Read the chart carefully and recognize, using context, where substitutions have occurred.        I have spent a total of 25 min in reviewing and/or ordering tests, medications, or procedures, performing an examination or evaluation, reviewing pertinent history, counseling and educating the patient, referring and/or communicating with other health care professionals, documenting in the EMR and general coordination of care of the patient today.

## 2024-09-19 NOTE — LETTER
September 19, 2024     Marge Monk DO  [address] [facility].  [address]  [facility] [address]    Patient: Gloria Ramirez   YOB: 1960   Date of Visit: 9/19/2024       Dear Dr. Nnamdi Monk:    Thank you for referring Gloria Ramirez to me for evaluation. Below are my notes for this consultation.    If you have questions, please do not hesitate to call me. I look forward to following your patient along with you.         Sincerely,        Rajesh Quinn MD        CC: No Recipients    Rajesh Quinn MD  9/19/2024  4:49 PM  Sign when Signing Visit  Cardiology   Gloria Ramirez 64 y.o. female MRN: [id number]   Encounter: [id number]        Assessment:    >> Chest pain  >> Dyspnea on exertion: Likely due to deconditioning as her exercise capacity not impaired on exercise stress test  >> Dyslipidemia  >> Pedal edema  >> Prediabetes  >> Fatty liver previously noted on CT (August 2021)  >> Incidentally detected aortic root dilation      Plan:    -Echo and stress were unremarkable  -Calcium score was 0, however she has a family history of premature CAD and is a former smoker therefore will continue rosuvastatin  -Cardiac BNP was unremarkable she does not have any significant pedal edema at this time  -Return in 6 to 8 months with repeat lipid panel  -Will refer to weight management at patient request  -Repeat limited echo to evaluate aortic root prior to next visit  -She will follow-up with her primary care physician for pulmonary workup for her shortness of breath        9/19/2024: Still complains of mild shortness of breath on exertion, overall is doing well, her cardiac workup including echocardiogram, stress test, coronary calcium score were all unremarkable    CC: Chest pain, dyspnea on exertion    HPI  64 y.o. female presents with worsening dyspnea on exertion over the last several months.  She also has a pressure-like sensation over her chest that is nonradiating, 5 out of 10 in intensity, not  "associated with nausea vomiting or diaphoresis.  Can occur at any time.    She feels like she gets short of breath very easily.    She had a CT scan in 2021 at [facility] that showed aortic atherosclerosis.    She has a family history of premature CAD in her brothers.    She does complain of mild pedal edema bilaterally.      The patient denies palpitations, orthopnea, PND,  syncope, presyncope, diaphoresis, nausea/vomiting       The 10-year ASCVD risk score (Berny ALANIS, et al., 2019) is: 4.3%    Values used to calculate the score:      Age: 64 years      Sex: Female      Is Non- : No      Diabetic: No      Tobacco smoker: No      Systolic Blood Pressure: 126 mmHg      Is BP treated: No      HDL Cholesterol: 56 mg/dL      Total Cholesterol: 196 mg/dL            Physical exam  Objective  Vitals: Blood pressure 126/72, pulse 92, height 5' 7\" (1.702 m), weight 83.4 kg (183 lb 12.8 oz), SpO2 94%.    General:  AO x3, no acute distress  Cardiac:  S1-S2 normal,  No murmurs. No rubs or gallops, JVP: normal  Lungs:  Clear to auscultation bilaterally, no wheezing or crackles.  Abdomen:  Soft, nontender, nondistended.  Extremities:  Warm, well perfused, pulses palpable, no ulcers or rashes. Edema:absent  Neuro: Grossly nonfocal    CT chest abdomen pelvis: 8/11/2021:    Impression:   1. No acute abnormality in the abdomen or pelvis.   2. Fatty infiltration of the liver.   3. Mild sigmoid diverticulosis.     ======================================================  TREADMILL STRESS  No results found for this or any previous visit.     ----------------------------------------------------------------------------------------------  NUCLEAR STRESS TEST: No results found for this or any previous visit.    No results found for this or any previous visit.      --------------------------------------------------------------------------------  CATH:  No results found for this or any previous " visit.    --------------------------------------------------------------------------------  ECHO:   No results found for this or any previous visit.    No results found for this or any previous visit.    --------------------------------------------------------------------------------  HOLTER  No results found for this or any previous visit.    --------------------------------------------------------------------------------  CAROTIDS  No results found for this or any previous visit.       Diagnoses and all orders for this visit:    Lipid disorder  -     POCT ECG  -     Lipid Panel With Direct LDL; Future    Obesity due to excess calories without serious comorbidity, unspecified classification  -     Ambulatory Referral to Weight Management; Future  -     Lipid Panel With Direct LDL; Future    Ascending aorta dilation (HCC)  -     Echo follow up/limited w/ contrast if indicated; Future    Other orders  -     magnesium gluconate (MAGONATE) 500 mg tablet; Take 500 mg by mouth daily at bedtime       ======================================================          Review of Systems  ROS as noted above, otherwise 12 point review of systems was performed and is negative.     Historical Information  Past Medical History:   Diagnosis Date   • Congenital hip dysplasia    • Seasonal allergies      Past Surgical History:   Procedure Laterality Date   • HIP SURGERY     • OVARIAN CYST REMOVAL     • TUBAL LIGATION       Social History     Substance and Sexual Activity   Alcohol Use Yes    Comment: rare     Social History     Substance and Sexual Activity   Drug Use Never     Social History     Tobacco Use   Smoking Status Former   • Passive exposure: Past   Smokeless Tobacco Never     Family History   Problem Relation Age of Onset   • Lung cancer Mother    • Lung cancer Father    • Cancer Father         Urinary Bladder   • Thyroid disease Daughter    • Heart disease Maternal Grandmother    • Heart disease Maternal Grandfather    •  "Heart disease Paternal Grandmother    • No Known Problems Paternal Grandfather    • No Known Problems Son    • Cancer Maternal Aunt         unsure of type   • Lung cancer Maternal Aunt    • Cancer Paternal Aunt         unsure of type       Meds/Allergies  Hospital Medications:   No current facility-administered medications for this visit.    Home Medications: Not in a hospital admission.    No Known Allergies      Portions of the record may have been created with voice recognition software.  Occasional wrong words or \"sound a like\" substitutions may have occurred due to the inherent limitations of voice recognition software.  Read the chart carefully and recognize, using context, where substitutions have occurred.        I have spent a total of 25 min in reviewing and/or ordering tests, medications, or procedures, performing an examination or evaluation, reviewing pertinent history, counseling and educating the patient, referring and/or communicating with other health care professionals, documenting in the EMR and general coordination of care of the patient today.              "

## 2024-09-25 DIAGNOSIS — E78.00 HYPERCHOLESTEROLEMIA: ICD-10-CM

## 2024-09-25 RX ORDER — ROSUVASTATIN CALCIUM 10 MG/1
10 TABLET, COATED ORAL DAILY
Qty: 90 TABLET | Refills: 3 | Status: SHIPPED | OUTPATIENT
Start: 2024-09-25

## 2024-10-30 ENCOUNTER — OFFICE VISIT (OUTPATIENT)
Dept: FAMILY MEDICINE CLINIC | Facility: CLINIC | Age: 64
End: 2024-10-30

## 2024-10-30 VITALS
HEART RATE: 80 BPM | TEMPERATURE: 97.4 F | BODY MASS INDEX: 28.6 KG/M2 | OXYGEN SATURATION: 94 % | WEIGHT: 182.2 LBS | DIASTOLIC BLOOD PRESSURE: 74 MMHG | SYSTOLIC BLOOD PRESSURE: 122 MMHG | HEIGHT: 67 IN

## 2024-10-30 DIAGNOSIS — J20.9 ACUTE BRONCHITIS, UNSPECIFIED ORGANISM: Primary | ICD-10-CM

## 2024-10-30 RX ORDER — PREDNISONE 10 MG/1
TABLET ORAL
Qty: 26 TABLET | Refills: 0 | Status: SHIPPED | OUTPATIENT
Start: 2024-10-30

## 2024-10-30 RX ORDER — CEFUROXIME AXETIL 500 MG/1
500 TABLET ORAL EVERY 12 HOURS SCHEDULED
Qty: 20 TABLET | Refills: 0 | Status: SHIPPED | OUTPATIENT
Start: 2024-10-30 | End: 2024-11-09

## 2024-10-31 NOTE — PROGRESS NOTES
Patient ID: Gloria Ramirez is a 64 y.o. female.    HPI: 64 y.o.female presenting with symptoms of chest congestion, cough with pale yellow phlegm and wheezing.      SUBJECTIVE    Family History   Problem Relation Age of Onset    Lung cancer Mother     Lung cancer Father     Cancer Father         Urinary Bladder    Thyroid disease Daughter     Heart disease Maternal Grandmother     Heart disease Maternal Grandfather     Heart disease Paternal Grandmother     No Known Problems Paternal Grandfather     No Known Problems Son     Cancer Maternal Aunt         unsure of type    Lung cancer Maternal Aunt     Cancer Paternal Aunt         unsure of type     Social History     Socioeconomic History    Marital status: /Civil Union     Spouse name: Not on file    Number of children: Not on file    Years of education: Not on file    Highest education level: Not on file   Occupational History    Not on file   Tobacco Use    Smoking status: Former     Passive exposure: Past    Smokeless tobacco: Never   Vaping Use    Vaping status: Never Used   Substance and Sexual Activity    Alcohol use: Yes     Comment: rare    Drug use: Never    Sexual activity: Not on file   Other Topics Concern    Not on file   Social History Narrative    Coffee     Social Determinants of Health     Financial Resource Strain: Low Risk  (6/12/2024)    Received from [facility]    Overall Financial Resource Strain (CARDIA)     Difficulty of Paying Living Expenses: Not hard at all   Food Insecurity: No Food Insecurity (6/12/2024)    Received from [facility]    Hunger Vital Sign     Worried About Running Out of Food in the Last Year: Never true     Ran Out of Food in the Last Year: Never true   Transportation Needs: No Transportation Needs (6/12/2024)    Received from [facility]    PRAPARE - Transportation     Lack of Transportation (Medical): No     Lack of Transportation (Non-Medical): No   Physical  Activity: Not on file   Stress: Not on file   Social Connections: Not on file   Intimate Partner Violence: Not At Risk (6/12/2024)    Received from [facility]    Humiliation, Afraid, Rape, and Kick questionnaire     Fear of Current or Ex-Partner: No     Emotionally Abused: No     Physically Abused: No     Sexually Abused: No   Housing Stability: Low Risk  (6/12/2024)    Received from [facility]    Housing Stability Vital Sign     Unable to Pay for Housing in the Last Year: No     Number of Times Moved in the Last Year: 0     Homeless in the Last Year: No     Past Medical History:   Diagnosis Date    Congenital hip dysplasia     Seasonal allergies      Past Surgical History:   Procedure Laterality Date    HIP SURGERY      OVARIAN CYST REMOVAL      TUBAL LIGATION       No Known Allergies    Current Outpatient Medications:     albuterol (2.5 mg/3 mL) 0.083 % nebulizer solution, Take 3 mL (2.5 mg total) by nebulization every 6 (six) hours as needed for wheezing or shortness of breath, Disp: 75 mL, Rfl: 0    albuterol (Ventolin HFA) 90 mcg/act inhaler, Inhale 2 puffs every 4 (four) hours as needed for wheezing, Disp: 18 g, Rfl: 0    azelastine (ASTELIN) 0.1 % nasal spray, 2 sprays into each nostril 2 (two) times a day Use in each nostril as directed, Disp: 30 mL, Rfl: 3    cefuroxime (CEFTIN) 500 mg tablet, Take 1 tablet (500 mg total) by mouth every 12 (twelve) hours for 10 days, Disp: 20 tablet, Rfl: 0    cholecalciferol (VITAMIN D3) 1,000 units tablet, Take 1,000 Units by mouth daily, Disp: , Rfl:     Cyanocobalamin (VITAMIN B 12 PO), Take by mouth, Disp: , Rfl:     ELDERBERRY-VITAMIN C-ZINC PO, Take by mouth, Disp: , Rfl:     magnesium gluconate (MAGONATE) 500 mg tablet, Take 500 mg by mouth daily at bedtime, Disp: , Rfl:     multivitamin (THERAGRAN) TABS, Take 1 tablet by mouth daily, Disp: , Rfl:     Omega-3 1000 MG CAPS, Take by mouth, Disp: , Rfl:     predniSONE 10 mg tablet, 3  "tabs po bid x2 days, then 2 tabs po bid x2 days, then 1 tab bid x2 days, then 1 daily until done., Disp: 26 tablet, Rfl: 0    rosuvastatin (CRESTOR) 10 MG tablet, TAKE 1 TABLET BY MOUTH DAILY, Disp: 90 tablet, Rfl: 3    TURMERIC PO, Take by mouth, Disp: , Rfl:     Clenpiq oral solution, PLEASE FOLLOW DIRECTIONS ON SHEET GIVEN (Patient not taking: Reported on 7/23/2024), Disp: , Rfl:     Misc Natural Products (Elderberry/Vitamin C/Zinc) CHEW, Chew (Patient not taking: Reported on 10/30/2024), Disp: , Rfl:     promethazine-dextromethorphan (PHENERGAN-DM) 6.25-15 mg/5 mL oral syrup, Take 5 mL by mouth 4 (four) times a day as needed for cough (Patient not taking: Reported on 4/30/2024), Disp: 180 mL, Rfl: 0    Review of Systems    Constitutional:     Denies fever, chills, fatigue, weakness ,weight loss, weight gain     ENT: Denies earache, loss of hearing, nosebleed, nasal discharge,nasal congestion, sore throat,hoarseness  Pulmonary: Denies shortness of breath , dyspnea on exertion, orthopnea ,but complains of productive cough with yellow phlegm, wheezing and pnd.  Cardiovascular:  Denies bradycardia , tachycardia ,palpations, lower extremity, edema leg, claudication  Breast:  Denies new or changing breast lumps,  nipple discharge, nipple changes,  Abdomen:  Denies abdominal pain , anorexia ,indigestion, nausea ,vomiting, constipation , diarrhea  Musculoskeletal: Denies myalgias, arthralgias, joint swelling, joint stiffness ,limb pain, limb swelling  Lymph: denies swollen glands  Gu: no dysuria or urinary frequency  Skin: Denies skin rash, skin lesion, skin wound, itching,dry skin  Neuro: Denies headache, numbness, tingling, confusion, loss of consciousness, dizziness ,vertigo  Psychiatric: Denies feelings of depression, suicidal ideation, anxiety, sleep disturbances    OBJECTIVE  /74 (BP Location: Left arm, Patient Position: Sitting, Cuff Size: Large)   Pulse 80   Temp (!) 97.4 °F (36.3 °C)   Ht 5' 7\" (1.702 " m)   Wt 82.6 kg (182 lb 3.2 oz)   SpO2 94%   BMI 28.54 kg/m²   Constitutional:   NAD, well appearing and well nourished      ENT:   Conjunctiva and lids: no injection, edema, or discharge     Pupils and iris: BABAR bilaterally    External inspection of ears and nose: normal without deformities or discharge.      Otoscopic exam: Canals patent without erythema; tm are dull and erythematous bilaterally       Nasal mucosa, septum and turbinates: Turbinae injection with discharge   Oropharynx:  Moist mucosa, normal tongue and tonsils without lesions.Erythema and injection  of post pharynx with pnd     Pulmonary:Respiratory effort normal rate and rhythm, no increased work of breathing. Auscultation of lungs:  Scattered rhonchi and expiratory wheezes bilaterally      Cardiovascular: regular rate and rhythm, S1 and S2, no murmur, no edema and/or varicosities of LE      Abdomen: Soft and non-distended    Positive bowel sounds    No heptomegaly or splenomegaly    Gu: no suprapubic tenderness, no CVA tenderness, or urethral discharge  Lymphatic: Anterior cervical lymphadenopathy     Muscskeletal:  Gait and station: Normal gait     Digits and nails normal without clubbing or cyanosis      Inspection/palpation of joints, bones, and muscles:  No joint tenderness, swelling, full active and passive range of motion  Skin: Normal skin turgor and no rashes      Neuro:    Normal reflexes       Psych:   alert and oriented to person, place and time     normal mood and affect       Assessment/Plan:Diagnoses and all orders for this visit:    Acute bronchitis, unspecified organism  Comments:  Pt to take mucinex dm bid.  Orders:  -     cefuroxime (CEFTIN) 500 mg tablet; Take 1 tablet (500 mg total) by mouth every 12 (twelve) hours for 10 days  -     predniSONE 10 mg tablet; 3 tabs po bid x2 days, then 2 tabs po bid x2 days, then 1 tab bid x2 days, then 1 daily until done.        Reviewed with patient plan to treat with above plan.     Patient instructed to call in 72 hours if not feeling better or if symptoms worsen

## 2024-11-11 ENCOUNTER — OFFICE VISIT (OUTPATIENT)
Dept: FAMILY MEDICINE CLINIC | Facility: CLINIC | Age: 64
End: 2024-11-11
Payer: COMMERCIAL

## 2024-11-11 VITALS
WEIGHT: 184.8 LBS | DIASTOLIC BLOOD PRESSURE: 74 MMHG | OXYGEN SATURATION: 95 % | BODY MASS INDEX: 29 KG/M2 | TEMPERATURE: 97.7 F | SYSTOLIC BLOOD PRESSURE: 120 MMHG | HEIGHT: 67 IN | HEART RATE: 87 BPM

## 2024-11-11 DIAGNOSIS — H69.93 CHRONIC EUSTACHIAN TUBE DYSFUNCTION, BILATERAL: Primary | ICD-10-CM

## 2024-11-11 PROCEDURE — 99213 OFFICE O/P EST LOW 20 MIN: CPT | Performed by: FAMILY MEDICINE

## 2024-11-11 RX ORDER — METHYLPREDNISOLONE 4 MG/1
TABLET ORAL
Qty: 12 TABLET | Refills: 0 | Status: SHIPPED | OUTPATIENT
Start: 2024-11-11

## 2024-11-11 RX ORDER — AZITHROMYCIN 250 MG/1
TABLET, FILM COATED ORAL
Qty: 6 TABLET | Refills: 0 | Status: SHIPPED | OUTPATIENT
Start: 2024-11-11 | End: 2024-11-15

## 2024-11-19 NOTE — PROGRESS NOTES
Patient ID: Gloria Ramirez is a 64 y.o. female.    HPI: 64 y.o.female presenting with symptoms of ear pressure, crackling of ears and dizziness associated with positional changes.  Symptoms for the past few days..    SUBJECTIVE    Family History   Problem Relation Age of Onset    Lung cancer Mother     Lung cancer Father     Cancer Father         Urinary Bladder    Thyroid disease Daughter     Heart disease Maternal Grandmother     Heart disease Maternal Grandfather     Heart disease Paternal Grandmother     No Known Problems Paternal Grandfather     No Known Problems Son     Cancer Maternal Aunt         unsure of type    Lung cancer Maternal Aunt     Cancer Paternal Aunt         unsure of type     Social History     Socioeconomic History    Marital status: /Civil Union     Spouse name: Not on file    Number of children: Not on file    Years of education: Not on file    Highest education level: Not on file   Occupational History    Not on file   Tobacco Use    Smoking status: Former     Passive exposure: Past    Smokeless tobacco: Never   Vaping Use    Vaping status: Never Used   Substance and Sexual Activity    Alcohol use: Yes     Comment: rare    Drug use: Never    Sexual activity: Not on file   Other Topics Concern    Not on file   Social History Narrative    Coffee     Social Drivers of Health     Financial Resource Strain: Low Risk  (6/12/2024)    Received from [facility]    Overall Financial Resource Strain (CARDIA)     Difficulty of Paying Living Expenses: Not hard at all   Food Insecurity: No Food Insecurity (6/12/2024)    Received from [facility]    Hunger Vital Sign     Worried About Running Out of Food in the Last Year: Never true     Ran Out of Food in the Last Year: Never true   Transportation Needs: No Transportation Needs (6/12/2024)    Received from [facility]    PRAPARE - Transportation     Lack of Transportation (Medical): No     Lack  of Transportation (Non-Medical): No   Physical Activity: Not on file   Stress: Not on file   Social Connections: Not on file   Intimate Partner Violence: Not At Risk (6/12/2024)    Received from [facility]    Humiliation, Afraid, Rape, and Kick questionnaire     Fear of Current or Ex-Partner: No     Emotionally Abused: No     Physically Abused: No     Sexually Abused: No   Housing Stability: Low Risk  (6/12/2024)    Received from [facility]    Housing Stability Vital Sign     Unable to Pay for Housing in the Last Year: No     Number of Times Moved in the Last Year: 0     Homeless in the Last Year: No     Past Medical History:   Diagnosis Date    Congenital hip dysplasia     Seasonal allergies      Past Surgical History:   Procedure Laterality Date    HIP SURGERY      OVARIAN CYST REMOVAL      TUBAL LIGATION       No Known Allergies    Current Outpatient Medications:     albuterol (Ventolin HFA) 90 mcg/act inhaler, Inhale 2 puffs every 4 (four) hours as needed for wheezing, Disp: 18 g, Rfl: 0    azelastine (ASTELIN) 0.1 % nasal spray, 2 sprays into each nostril 2 (two) times a day Use in each nostril as directed, Disp: 30 mL, Rfl: 3    cholecalciferol (VITAMIN D3) 1,000 units tablet, Take 1,000 Units by mouth daily, Disp: , Rfl:     Cyanocobalamin (VITAMIN B 12 PO), Take by mouth, Disp: , Rfl:     ELDERBERRY-VITAMIN C-ZINC PO, Take by mouth, Disp: , Rfl:     magnesium gluconate (MAGONATE) 500 mg tablet, Take 500 mg by mouth daily at bedtime, Disp: , Rfl:     methylprednisolone (MEDROL) 4 mg tablet, 1 po [id number] , then 1 daily x4 days, Disp: 12 tablet, Rfl: 0    multivitamin (THERAGRAN) TABS, Take 1 tablet by mouth daily, Disp: , Rfl:     Omega-3 1000 MG CAPS, Take by mouth, Disp: , Rfl:     rosuvastatin (CRESTOR) 10 MG tablet, TAKE 1 TABLET BY MOUTH DAILY, Disp: 90 tablet, Rfl: 3    TURMERIC PO, Take by mouth, Disp: , Rfl:     albuterol (2.5 mg/3 mL) 0.083 % nebulizer solution,  "Take 3 mL (2.5 mg total) by nebulization every 6 (six) hours as needed for wheezing or shortness of breath (Patient not taking: Reported on 11/11/2024), Disp: 75 mL, Rfl: 0    Misc Natural Products (Elderberry/Vitamin C/Zinc) CHEW, Chew (Patient not taking: Reported on 10/30/2024), Disp: , Rfl:     promethazine-dextromethorphan (PHENERGAN-DM) 6.25-15 mg/5 mL oral syrup, Take 5 mL by mouth 4 (four) times a day as needed for cough (Patient not taking: Reported on 4/30/2024), Disp: 180 mL, Rfl: 0    Review of Systems  Constitutional:     Denies fever, chills, fatigue, weakness ,weight loss, weight gain       ENT: Denies earache, loss of hearing, nosebleed, nasal discharge,nasal congestion, sore throat,hoarseness, but complains of ear pressure,and crackling    Pulmonary: Denies shortness of breath ,cough , dyspnea on exertionon, orthopnea , PND   Cardiovascular:  Denies bradycardia , tachycardia ,palpations, lower extremity, edema leg, claudication  Breast:  Denies new or changing breast lumps,  nipple discharge, nipple changes,  Abdomen:  Denies abdominal pain , anorexia ,indigestion, nausea ,vomiting, constipation , diarrhea  Musculoskeletal: Denies myalgias, arthralgias, joint swelling, joint stiffness ,limb pain, limb swelling  Lymph:denies swollen glands  Gu: no dysuria or urinary frequency  Skin: Denies skin rash, skin lesion, skin wound, itching,dry skin  Neuro: Denies headache, numbness, tingling, confusion, loss of consciousness, but complains of postitional vertigo  Psychiatric: Denies feelings of depression, suicidal ideation, anxiety, sleep disturbances    OBJECTIVE  /74   Pulse 87   Temp 97.7 °F (36.5 °C)   Ht 5' 7\" (1.702 m)   Wt 83.8 kg (184 lb 12.8 oz)   SpO2 95%   BMI 28.94 kg/m²   Constitutional:   NAD, well appearing and well nourished      ENT:   Conjunctiva and lids: no injection, edema, or discharge     Pupils and iris: BABAR bilaterally    External inspection of ears and nose: normal " without deformities or discharge.      Otoscopic exam: Canals patent with tm dull, no erythema,but large effusions noted bilaterally  ENasal mucosa, septum and turbinates: Turbinae injection with discharge   Oropharynx:  Moist mucosa, normal tongue and tonsils without lesions.Erythema and injection  of post pharynx with pnd      Pulmonary:Respiratory effort normal rate and rhythm, no increased work of breathing. Auscultation of lungs:  Clear bilaterally with no adventitious breath sounds       Cardiovascular: regular rate and rhythm, S1 and S2, no murmur, no edema and/or varicosities of LE      Abdomen: Soft and non-distended     Positive bowel sounds      No heptomegaly or splenomegaly      Lymphatic: Anterior and posterior cervical lymphadenopathy         Muscskeletal:  Gait and station: Normal gait      Digits and nails normal without clubbing or cyanosis       Inspection/palpation of joints, bones, and muscles:  No joint tenderness, swelling, full active and passive range of motion       Gu: no suprabubic tenderness, CVA tenderness or urethral discharge  Skin: Normal skin turgor and no rashes      Neuro:    Normal reflexes     Psych:   alert and oriented to person, place and time     normal mood and affect       Assessment/Plan:Diagnoses and all orders for this visit:    Chronic Eustachian tube dysfunction, bilateral  Comments:  Continue with antihistamine therapy.  Orders:  -     methylprednisolone (MEDROL) 4 mg tablet; 1 po [id number] , then 1 daily x4 days  -     azithromycin (ZITHROMAX) 250 mg tablet; Take 2 tablets today then 1 tablet daily x 4 days  -     Ambulatory Referral to Otolaryngology; Future        Reviewed with patient plan to treat with above plan.    Patient instructed to call in 72 hours if not feeling better or if symptoms worsen

## 2024-12-02 ENCOUNTER — TELEPHONE (OUTPATIENT)
Dept: FAMILY MEDICINE CLINIC | Facility: CLINIC | Age: 64
End: 2024-12-02

## 2024-12-02 NOTE — TELEPHONE ENCOUNTER
This would need to be sent to the pharmacy. If you want to send the med to [facility] then we can sent for authorization.

## 2024-12-02 NOTE — TELEPHONE ENCOUNTER
Please send the initial dosing through [facility] mail delivery  (she is aware of titration dosing). She would like to try to have this handled through her health insurance.  If you send the RX we can start auth

## 2024-12-03 DIAGNOSIS — R63.8 INCREASED BMI: Primary | ICD-10-CM

## 2024-12-03 RX ORDER — PHENTERMINE AND TOPIRAMATE 7.5; 46 MG/1; MG/1
1 CAPSULE, EXTENDED RELEASE ORAL DAILY
Qty: 30 CAPSULE | Refills: 0 | Status: SHIPPED | OUTPATIENT
Start: 2024-12-03 | End: 2025-01-02

## 2024-12-03 RX ORDER — PHENTERMINE AND TOPIRAMATE 3.75; 23 MG/1; MG/1
1 CAPSULE, EXTENDED RELEASE ORAL DAILY
Qty: 14 CAPSULE | Refills: 0 | Status: SHIPPED | OUTPATIENT
Start: 2024-12-03 | End: 2024-12-12 | Stop reason: SDUPTHER

## 2024-12-04 ENCOUNTER — TELEPHONE (OUTPATIENT)
Age: 64
End: 2024-12-04

## 2024-12-04 NOTE — TELEPHONE ENCOUNTER
PA for QSYMIA 3.75-23MG- APPROVED     Date(s) approved December 4, 2024 to June 4, 2025     Patient advised by          [x[facility]  []Phone call   [x]LMOM  []L/M to call office as no active Communication consent on file  []Unable to leave detailed message as VM not approved on Communication consent       Pharmacy advised by    [x]Fax  []Phone call    Approval letter scanned into Media Yes

## 2024-12-04 NOTE — TELEPHONE ENCOUNTER
PA for QSYMIA 3.14-40HG-[id number] to [employer]    via      [x][employer]-Case ID #       [x]PA sent as URGENT    All office notes, labs and other pertaining documents and studies sent. Clinical questions answered. Awaiting determination from insurance company.     Turnaround time for your insurance to make a decision on your Prior Authorization can take 7-21 business days.

## 2024-12-06 NOTE — TELEPHONE ENCOUNTER
Patient received a call from insurance this am that they woul;d not cover medication even though the PA was approved Patient has to get medication thought the company as per PCP stated  How does she need to proceed Please advise

## 2024-12-06 NOTE — TELEPHONE ENCOUNTER
Gave direction to sign up for mail order. She is going to call us back once she signs up. She may send a Litigaint message instead of a telephone call.

## 2024-12-11 NOTE — TELEPHONE ENCOUNTER
Patient called to verify that the QSYMIA 3.75-23MG- was approved please sent medication  to [facility] pharmacy.

## 2024-12-12 DIAGNOSIS — R63.8 INCREASED BMI: ICD-10-CM

## 2024-12-12 RX ORDER — PHENTERMINE AND TOPIRAMATE 3.75; 23 MG/1; MG/1
1 CAPSULE, EXTENDED RELEASE ORAL DAILY
Qty: 30 CAPSULE | Refills: 0 | Status: SHIPPED | OUTPATIENT
Start: 2024-12-12 | End: 2024-12-26

## 2024-12-27 ENCOUNTER — PATIENT MESSAGE (OUTPATIENT)
Dept: FAMILY MEDICINE CLINIC | Facility: CLINIC | Age: 64
End: 2024-12-27

## 2024-12-27 DIAGNOSIS — R63.8 INCREASED BMI: ICD-10-CM

## 2024-12-27 RX ORDER — PHENTERMINE AND TOPIRAMATE 7.5; 46 MG/1; MG/1
1 CAPSULE, EXTENDED RELEASE ORAL DAILY
Qty: 30 CAPSULE | Refills: 0 | Status: SHIPPED | OUTPATIENT
Start: 2024-12-27 | End: 2025-01-26

## 2024-12-27 NOTE — TELEPHONE ENCOUNTER
Pt called to say she is taking the Qsymia 3.75 for 14 days. As of Monday 12/30/24 she will be done with that. She will still have 14 pills left of the 3.75 mg. Should she double that dose to make the 7.5 mg and finish them up or will Dr Monk be sending in a new script at that time? Either was she will need the new dose of 7.5 mg next week or the week after that to [facility]. Please advise.

## 2025-01-26 DIAGNOSIS — R63.8 INCREASED BMI: ICD-10-CM

## 2025-01-27 RX ORDER — PHENTERMINE AND TOPIRAMATE 7.5; 46 MG/1; MG/1
1 CAPSULE, EXTENDED RELEASE ORAL DAILY
Qty: 30 CAPSULE | Refills: 0 | Status: SHIPPED | OUTPATIENT
Start: 2025-01-27 | End: 2025-02-26

## 2025-01-27 NOTE — TELEPHONE ENCOUNTER
1 [id number] 12/13/2024 12/12/2024 Qsymia (Capsule, Extended Release) 30.0 30 3.75 MG-23 MG NA MONTY RAMOS [facility] Commercial Insurance 0 / 0 [place]

## 2025-02-27 ENCOUNTER — HOSPITAL ENCOUNTER (OUTPATIENT)
Dept: NON INVASIVE DIAGNOSTICS | Facility: CLINIC | Age: 65
Discharge: HOME | End: 2025-02-27
Payer: COMMERCIAL

## 2025-02-27 VITALS
SYSTOLIC BLOOD PRESSURE: 120 MMHG | BODY MASS INDEX: 28.88 KG/M2 | HEART RATE: 80 BPM | WEIGHT: 184 LBS | DIASTOLIC BLOOD PRESSURE: 74 MMHG | HEIGHT: 67 IN

## 2025-02-27 DIAGNOSIS — I77.810 ASCENDING AORTA DILATION (HCC): ICD-10-CM

## 2025-02-27 LAB
AORTIC ROOT: 4 CM
ASCENDING AORTA: 3.9 CM
AV LVOT MEAN GRADIENT: 3 MMHG
AV LVOT PEAK GRADIENT: 6 MMHG
AV REGURGITATION PRESSURE HALF TIME: 838 MS
BSA FOR ECHO PROCEDURE: 1.95 M2
DOP CALC LVOT AREA: 3.46 CM2
DOP CALC LVOT CARDIAC INDEX: 3.62 L/MIN/M2
DOP CALC LVOT CARDIAC OUTPUT: 7.1 L/MIN
DOP CALC LVOT DIAMETER: 2.1 CM
DOP CALC LVOT PEAK VEL VTI: 26.63 CM
DOP CALC LVOT PEAK VEL: 1.27 M/S
DOP CALC LVOT STROKE INDEX: 49 ML/M2
DOP CALC LVOT STROKE VOLUME: 92.19
E WAVE DECELERATION TIME: 224 MS
E/A RATIO: 0.92
FRACTIONAL SHORTENING: 33 (ref 28–44)
INTERVENTRICULAR SEPTUM IN DIASTOLE (PARASTERNAL SHORT AXIS VIEW): 1.2 CM
INTERVENTRICULAR SEPTUM: 1.2 CM (ref 0.6–1.1)
LEFT ATRIUM SIZE: 3.4 CM
LEFT INTERNAL DIMENSION IN SYSTOLE: 2.7 CM (ref 2.1–4)
LEFT VENTRICULAR INTERNAL DIMENSION IN DIASTOLE: 4 CM (ref 3.5–6)
LEFT VENTRICULAR POSTERIOR WALL IN END DIASTOLE: 1 CM
LEFT VENTRICULAR STROKE VOLUME: 41 ML
LV EF US.2D.A4C+ESTIMATED: 60 %
LVSV (TEICH): 41 ML
MV E'TISSUE VEL-SEP: 7 CM/S
MV PEAK A VEL: 0.96 M/S
MV PEAK E VEL: 88 CM/S
MV STENOSIS PRESSURE HALF TIME: 65 MS
MV VALVE AREA P 1/2 METHOD: 3.38
SINOTUBULAR JUNCTION: 3.2 CM
SL CV AV DECELERATION TIME RETROGRADE: 2891 MS
SL CV AV PEAK GRADIENT RETROGRADE: 52 MMHG
SL CV LV EF: 60
SL CV PED ECHO LEFT VENTRICLE DIASTOLIC VOLUME (MOD BIPLANE) 2D: 69 ML
SL CV PED ECHO LEFT VENTRICLE SYSTOLIC VOLUME (MOD BIPLANE) 2D: 27 ML
SL CV SINUS OF VALSALVA 2D: 4 CM
STJ: 3.2 CM
TR MAX PG: 17 MMHG
TR PEAK VELOCITY: 2.1 M/S
TRICUSPID ANNULAR PLANE SYSTOLIC EXCURSION: 1.7 CM
TRICUSPID VALVE PEAK REGURGITATION VELOCITY: 2.08 M/S

## 2025-02-27 PROCEDURE — 93308 TTE F-UP OR LMTD: CPT | Performed by: INTERNAL MEDICINE

## 2025-02-27 PROCEDURE — 93325 DOPPLER ECHO COLOR FLOW MAPG: CPT | Performed by: INTERNAL MEDICINE

## 2025-02-27 PROCEDURE — 93321 DOPPLER ECHO F-UP/LMTD STD: CPT | Performed by: INTERNAL MEDICINE

## 2025-02-27 PROCEDURE — 93321 DOPPLER ECHO F-UP/LMTD STD: CPT

## 2025-02-27 PROCEDURE — 93308 TTE F-UP OR LMTD: CPT

## 2025-02-27 PROCEDURE — 93325 DOPPLER ECHO COLOR FLOW MAPG: CPT

## 2025-02-28 ENCOUNTER — RESULTS FOLLOW-UP (OUTPATIENT)
Dept: CARDIOLOGY CLINIC | Facility: CLINIC | Age: 65
End: 2025-02-28

## 2025-02-28 DIAGNOSIS — R63.8 INCREASED BMI: ICD-10-CM

## 2025-02-28 RX ORDER — PHENTERMINE AND TOPIRAMATE 7.5; 46 MG/1; MG/1
1 CAPSULE, EXTENDED RELEASE ORAL DAILY
Qty: 30 CAPSULE | Refills: 0 | Status: SHIPPED | OUTPATIENT
Start: 2025-02-28 | End: 2025-03-30

## 2025-02-28 NOTE — TELEPHONE ENCOUNTER
1 [id number] 01/28/2025 01/27/2025 Qsymia (Capsule, Extended Release) 30.0 30 7.5 MG-46 MG [facility] Commercial Insurance 0 / 0 [place]    1 [id number] 12/30/2024 12/27/2024 Qsymia (Capsule, Extended Release) 30.0 30 7.5 MG-46 MG NA TASHA [facility] Commercial Insurance 0 / 0 [place]    1 [id number] 12/13/2024 12/12/2024 Qsymia (Capsule, Extended Release) 30.0 30 3.75 MG-23 MG NA MONTY [facility] Commercial Insurance 0 / 0 [place]

## 2025-03-03 ENCOUNTER — OFFICE VISIT (OUTPATIENT)
Dept: FAMILY MEDICINE CLINIC | Facility: CLINIC | Age: 65
End: 2025-03-03
Payer: COMMERCIAL

## 2025-03-03 VITALS
TEMPERATURE: 97.3 F | HEART RATE: 92 BPM | WEIGHT: 177.8 LBS | DIASTOLIC BLOOD PRESSURE: 66 MMHG | SYSTOLIC BLOOD PRESSURE: 112 MMHG | BODY MASS INDEX: 27.91 KG/M2 | OXYGEN SATURATION: 98 % | HEIGHT: 67 IN

## 2025-03-03 DIAGNOSIS — R40.0 DAYTIME SLEEPINESS: ICD-10-CM

## 2025-03-03 DIAGNOSIS — J01.90 ACUTE SINUSITIS, RECURRENCE NOT SPECIFIED, UNSPECIFIED LOCATION: Primary | ICD-10-CM

## 2025-03-03 DIAGNOSIS — R06.83 SNORING: Primary | ICD-10-CM

## 2025-03-03 PROCEDURE — 99213 OFFICE O/P EST LOW 20 MIN: CPT | Performed by: FAMILY MEDICINE

## 2025-03-03 RX ORDER — PREDNISONE 10 MG/1
TABLET ORAL
Qty: 26 TABLET | Refills: 0 | Status: SHIPPED | OUTPATIENT
Start: 2025-03-03

## 2025-03-05 NOTE — PROGRESS NOTES
Patient ID: Gloria Ramirez is a 64 y.o. female.    Diagnoses and all orders for this visit:    Acute sinusitis, recurrence not specified, unspecified location  -     predniSONE 10 mg tablet; 3 tabs po bid x2 days, then 2 tabs po bid x2 days, then 1 tab bid x2 days, then 1 daily until done.  -     amoxicillin-clavulanate (AUGMENTIN) 875-125 mg per tablet; Take 1 tablet by mouth every 12 (twelve) hours for 10 days  Reviewed with patient plan to treat with above plan.    Patient instructed to call in 72 hours if not feeling better or if symptoms worsen       HPI: 64 y.o.female presenting with symptoms of sinus pain,pressure, nasal congestion, pnd dry cough , ear and throat pain. The patient complains of symptoms for the past one week.       SUBJECTIVE    Family History   Problem Relation Age of Onset    Lung cancer Mother     Lung cancer Father     Cancer Father         Urinary Bladder    Thyroid disease Daughter     Heart disease Maternal Grandmother     Heart disease Maternal Grandfather     Heart disease Paternal Grandmother     No Known Problems Paternal Grandfather     No Known Problems Son     Cancer Maternal Aunt         unsure of type    Lung cancer Maternal Aunt     Cancer Paternal Aunt         unsure of type     Social History     Socioeconomic History    Marital status: /Civil Union     Spouse name: Not on file    Number of children: Not on file    Years of education: Not on file    Highest education level: Not on file   Occupational History    Not on file   Tobacco Use    Smoking status: Former     Passive exposure: Past    Smokeless tobacco: Never   Vaping Use    Vaping status: Never Used   Substance and Sexual Activity    Alcohol use: Yes     Comment: rare    Drug use: Never    Sexual activity: Not on file   Other Topics Concern    Not on file   Social History Narrative    Coffee     Social Drivers of Health     Financial Resource Strain: Low Risk  (6/12/2024)    Received from [facility]  [facility]    Overall Financial Resource Strain (CARDIA)     Difficulty of Paying Living Expenses: Not hard at all   Food Insecurity: No Food Insecurity (6/12/2024)    Received from [facility]    Hunger Vital Sign     Worried About Running Out of Food in the Last Year: Never true     Ran Out of Food in the Last Year: Never true   Transportation Needs: No Transportation Needs (6/12/2024)    Received from [facility]    PRAPARE - Transportation     Lack of Transportation (Medical): No     Lack of Transportation (Non-Medical): No   Physical Activity: Not on file   Stress: Not on file   Social Connections: Not on file   Intimate Partner Violence: Not At Risk (6/12/2024)    Received from [facility], [facility]    Humiliation, Afraid, Rape, and Kick questionnaire     Fear of Current or Ex-Partner: No     Emotionally Abused: No     Physically Abused: No     Sexually Abused: No   Housing Stability: Low Risk  (6/12/2024)    Received from [facility]    Housing Stability Vital Sign     Unable to Pay for Housing in the Last Year: No     Number of Times Moved in the Last Year: 0     Homeless in the Last Year: No     Past Medical History:   Diagnosis Date    Congenital hip dysplasia     Seasonal allergies      Past Surgical History:   Procedure Laterality Date    HIP SURGERY      OVARIAN CYST REMOVAL      TUBAL LIGATION       No Known Allergies    Current Outpatient Medications:     albuterol (Ventolin HFA) 90 mcg/act inhaler, Inhale 2 puffs every 4 (four) hours as needed for wheezing, Disp: 18 g, Rfl: 0    amoxicillin-clavulanate (AUGMENTIN) 875-125 mg per tablet, Take 1 tablet by mouth every 12 (twelve) hours for 10 days, Disp: 20 tablet, Rfl: 0    azelastine (ASTELIN) 0.1 % nasal spray, 2 sprays into each nostril 2 (two) times a day Use in each nostril as directed, Disp: 30 mL, Rfl: 3    cholecalciferol (VITAMIN D3) 1,000 units tablet,  Take 1,000 Units by mouth daily, Disp: , Rfl:     Cyanocobalamin (VITAMIN B 12 PO), Take by mouth, Disp: , Rfl:     ELDERBERRY-VITAMIN C-ZINC PO, Take by mouth, Disp: , Rfl:     magnesium gluconate (MAGONATE) 500 mg tablet, Take 500 mg by mouth daily at bedtime, Disp: , Rfl:     multivitamin (THERAGRAN) TABS, Take 1 tablet by mouth daily, Disp: , Rfl:     Omega-3 1000 MG CAPS, Take by mouth, Disp: , Rfl:     Phentermine-Topiramate (Qsymia) 3.75-23 MG CP24, Take 1 capsule by mouth in the morning for 14 days (Patient not taking: Reported on 3/3/2025), Disp: 30 capsule, Rfl: 0    Phentermine-Topiramate (Qsymia) 7.5-46 MG CP24, Take 1 capsule by mouth in the morning, Disp: 30 capsule, Rfl: 0    predniSONE 10 mg tablet, 3 tabs po bid x2 days, then 2 tabs po bid x2 days, then 1 tab bid x2 days, then 1 daily until done., Disp: 26 tablet, Rfl: 0    rosuvastatin (CRESTOR) 10 MG tablet, TAKE 1 TABLET BY MOUTH DAILY, Disp: 90 tablet, Rfl: 3    TURMERIC PO, Take by mouth, Disp: , Rfl:     albuterol (2.5 mg/3 mL) 0.083 % nebulizer solution, Take 3 mL (2.5 mg total) by nebulization every 6 (six) hours as needed for wheezing or shortness of breath (Patient not taking: Reported on 11/11/2024), Disp: 75 mL, Rfl: 0    Misc Natural Products (Elderberry/Vitamin C/Zinc) CHEW, Chew (Patient not taking: Reported on 10/30/2024), Disp: , Rfl:     promethazine-dextromethorphan (PHENERGAN-DM) 6.25-15 mg/5 mL oral syrup, Take 5 mL by mouth 4 (four) times a day as needed for cough (Patient not taking: Reported on 4/30/2024), Disp: 180 mL, Rfl: 0    Review of Systems  Constitutional:     Denies fever, chills, fatigue, weakness ,weight loss, weight gain      ENT: Denies earache, loss of hearing, nosebleed, nasal discharge,but complains of nasal congestion, sore throat,hoarseness and sinus pain and pressure    Pulmonary: Denies shortness of breath ,cough , dyspnea on exertionon, orthopnea ,+ PND   Cardiovascular:  Denies bradycardia , tachycardia  ",palpations, lower extremity, edema leg, claudication  Breast:  Denies new or changing breast lumps,  nipple discharge, nipple changes,  Abdomen:  Denies abdominal pain , anorexia ,indigestion, nausea ,vomiting, constipation , diarrhea  Musculoskeletal: Denies myalgias, arthralgias, joint swelling, joint stiffness ,limb pain, limb swelling  Lymph:+ swollen glands  Gu: no dysuria or urinary frequency  Skin: Denies skin rash, skin lesion, skin wound, itching,dry skin  Neuro: Denies headache, numbness, tingling, confusion, loss of consciousness, dizziness ,vertigo  Psychiatric: Denies feelings of depression, suicidal ideation, anxiety, sleep disturbances    OBJECTIVE  /66   Pulse 92   Temp (!) 97.3 °F (36.3 °C)   Ht 5' 7\" (1.702 m)   Wt 80.6 kg (177 lb 12.8 oz)   SpO2 98%   BMI 27.85 kg/m²   Constitutional:   NAD, well appearing and well nourished      ENT:   Conjunctiva and lids: no injection, edema, or discharge    Pupils and iris: BABAR bilaterally   External inspection of ears and nose: normal without deformities or discharge.      Otoscopic exam: Canals patent ; tm are dull, with with erythem and effusions  ENasal mucosa, septum and turbinates: Turbinae injection with discharge   Oropharynx:  Moist mucosa, normal tongue and tonsils without lesions.Erythema and injection  of post pharynx with pnd      Pulmonary:Respiratory effort normal rate and rhythm, no increased work of breathing. Auscultation of lungs:  Clear bilaterally with no adventitious breath sounds       Cardiovascular: regular rate and rhythm, S1 and S2, no murmur, no edema and/or varicosities of LE      Abdomen: Soft and non-distended    Positive bowel sounds    No heptomegaly or splenomegaly    Lymphatic: Anterior  cervical lymphadenopathy         Muscskeletal:  Gait and station: Normal gait     Digits and nails normal without clubbing or cyanosis     Inspection/palpation of joints, bones, and muscles:  No joint tenderness, swelling, full " active and passive range of motion      Gu: no suprabubic tenderness, CVA tenderness or urethral discharge  Skin: Normal skin turgor and no rashes    Neuro:    Normal reflexes   Psych:   alert and oriented to person, place and time  normal mood and affect

## 2025-03-30 DIAGNOSIS — R63.8 INCREASED BMI: ICD-10-CM

## 2025-03-31 RX ORDER — PHENTERMINE AND TOPIRAMATE 7.5; 46 MG/1; MG/1
1 CAPSULE, EXTENDED RELEASE ORAL DAILY
Qty: 30 CAPSULE | Refills: 0 | Status: SHIPPED | OUTPATIENT
Start: 2025-03-31 | End: 2025-04-30

## 2025-03-31 NOTE — TELEPHONE ENCOUNTER
1 [id number] 03/03/2025 02/28/2025 Qsymia (Capsule, Extended Release) 30.0 30 7.5 MG-46 MG [facility] Commercial Insurance 0 / 0 [place]    1 [id number] 01/28/2025 01/27/2025 Qsymia (Capsule, Extended Release) 30.0 30 7.5 MG-46 MG NA MONTY [facility] Commercial Insurance 0 / 0 [place]    1 [id number] 12/30/2024 12/27/2024 Qsymia (Capsule, Extended Release) 30.0 30 7.5 MG-46 MG NA TASHA [facility] Commercial Insurance 0 / 0 [place]    1 [id number] 12/13/2024 12/12/2024 Qsymia (Capsule, Extended Release) 30.0 30 3.75 MG-23 MG NA MONTY [facility] Commercial Insurance 0 / 0

## 2025-04-08 ENCOUNTER — OFFICE VISIT (OUTPATIENT)
Dept: FAMILY MEDICINE CLINIC | Facility: CLINIC | Age: 65
End: 2025-04-08
Payer: COMMERCIAL

## 2025-04-08 ENCOUNTER — NURSE TRIAGE (OUTPATIENT)
Age: 65
End: 2025-04-08

## 2025-04-08 VITALS
BODY MASS INDEX: 27.5 KG/M2 | OXYGEN SATURATION: 95 % | HEART RATE: 92 BPM | SYSTOLIC BLOOD PRESSURE: 118 MMHG | HEIGHT: 67 IN | TEMPERATURE: 97.5 F | DIASTOLIC BLOOD PRESSURE: 72 MMHG | WEIGHT: 175.2 LBS

## 2025-04-08 DIAGNOSIS — J45.901 MILD ASTHMA WITH ACUTE EXACERBATION, UNSPECIFIED WHETHER PERSISTENT: ICD-10-CM

## 2025-04-08 DIAGNOSIS — J01.90 ACUTE SINUSITIS, RECURRENCE NOT SPECIFIED, UNSPECIFIED LOCATION: Primary | ICD-10-CM

## 2025-04-08 DIAGNOSIS — R68.89 FLU-LIKE SYMPTOMS: ICD-10-CM

## 2025-04-08 PROCEDURE — 96372 THER/PROPH/DIAG INJ SC/IM: CPT | Performed by: FAMILY MEDICINE

## 2025-04-08 PROCEDURE — 87804 INFLUENZA ASSAY W/OPTIC: CPT | Performed by: FAMILY MEDICINE

## 2025-04-08 PROCEDURE — 99213 OFFICE O/P EST LOW 20 MIN: CPT | Performed by: FAMILY MEDICINE

## 2025-04-08 PROCEDURE — 87811 SARS-COV-2 COVID19 W/OPTIC: CPT | Performed by: FAMILY MEDICINE

## 2025-04-08 RX ORDER — METHYLPREDNISOLONE ACETATE 80 MG/ML
80 INJECTION, SUSPENSION INTRA-ARTICULAR; INTRALESIONAL; INTRAMUSCULAR; SOFT TISSUE ONCE
Status: COMPLETED | OUTPATIENT
Start: 2025-04-08 | End: 2025-04-08

## 2025-04-08 RX ORDER — PREDNISONE 10 MG/1
TABLET ORAL
Qty: 26 TABLET | Refills: 0 | Status: SHIPPED | OUTPATIENT
Start: 2025-04-08

## 2025-04-08 RX ORDER — CEFUROXIME AXETIL 500 MG/1
500 TABLET ORAL EVERY 12 HOURS SCHEDULED
Qty: 20 TABLET | Refills: 0 | Status: SHIPPED | OUTPATIENT
Start: 2025-04-08 | End: 2025-04-18

## 2025-04-08 RX ORDER — DEXTROMETHORPHAN HYDROBROMIDE AND PROMETHAZINE HYDROCHLORIDE 15; 6.25 MG/5ML; MG/5ML
5 SYRUP ORAL 4 TIMES DAILY PRN
Qty: 180 ML | Refills: 0 | Status: SHIPPED | OUTPATIENT
Start: 2025-04-08

## 2025-04-08 RX ADMIN — METHYLPREDNISOLONE ACETATE 80 MG: 80 INJECTION, SUSPENSION INTRA-ARTICULAR; INTRALESIONAL; INTRAMUSCULAR; SOFT TISSUE at 14:21

## 2025-04-08 NOTE — TELEPHONE ENCOUNTER
"FOLLOW UP: Patient has an appointment today.      REASON FOR CONVERSATION: Cough    SYMPTOMS: Onset last week, with sinus pressure and ear pain.  Yesterday developed cough, that kept her up all night.  Sputum is thick and white.  Denies fever.  Endorses chest congestion/tightness with slight wheeze.  Chest feels \"tight\" and is reproducible with cough.  Denies SOB.      OTHER: Advised of ED protocol, should symptoms worsen.  Encouraged to call back with questions or concerns.     DISPOSITION: See Today in Office    Reason for Disposition   SEVERE coughing spells (e.g., whooping sound after coughing, vomiting after coughing)    Answer Assessment - Initial Assessment Questions  1. ONSET: \"When did the cough begin?\"       Chest symptoms started yesterday.  Sunday sinus pressure and ear pain.    2. SEVERITY: \"How bad is the cough today?\"       Kept her up all night.   3. SPUTUM: \"Describe the color of your sputum\" (e.g., none, dry cough; clear, white, yellow, green)      White, thick   4. HEMOPTYSIS: \"Are you coughing up any blood?\" If Yes, ask: \"How much?\" (e.g., flecks, streaks, tablespoons, etc.)      Denies   5. DIFFICULTY BREATHING: \"Are you having difficulty breathing?\" If Yes, ask: \"How bad is it?\" (e.g., mild, moderate, severe)       Denies   6. FEVER: \"Do you have a fever?\" If Yes, ask: \"What is your temperature, how was it measured, and when did it start?\"      Denies   7. CARDIAC HISTORY: \"Do you have any history of heart disease?\" (e.g., heart attack, congestive heart failure)       Denies   8. LUNG HISTORY: \"Do you have any history of lung disease?\"  (e.g., pulmonary embolus, asthma, emphysema)      Asthma   9. PE RISK FACTORS: \"Do you have a history of blood clots?\" (or: recent major surgery, recent prolonged travel, bedridden)      N/A  10. OTHER SYMPTOMS: \"Do you have any other symptoms?\" (e.g., runny nose, wheezing, chest pain)  Chest tightness. Slight wheezing.    Protocols used: Cough-Adult-[place]    "

## 2025-04-10 DIAGNOSIS — J45.901 ACUTE ASTHMA EXACERBATION: ICD-10-CM

## 2025-04-10 NOTE — PROGRESS NOTES
Patient ID: Gloria Ramirez is a 64 y.o. female.  Diagnoses and all orders for this visit:    Acute sinusitis, recurrence not specified, unspecified location  -     cefuroxime (CEFTIN) 500 mg tablet; Take 1 tablet (500 mg total) by mouth every 12 (twelve) hours for 10 days  -     predniSONE 10 mg tablet; 3 tabs po bid x2 days, then 2 tabs po bid x2 days, then 1 tab bid x2 days, then 1 daily until done.  -     promethazine-dextromethorphan (PHENERGAN-DM) 6.25-15 mg/5 mL oral syrup; Take 5 mL by mouth 4 (four) times a day as needed for cough  -     methylPREDNISolone acetate (DEPO-MEDROL) injection 80 mg    Mild asthma with acute exacerbation, unspecified whether persistent    Flu-like symptoms  -     POCT Rapid Covid Ag  -     POCT rapid flu A and B    Reviewed with patient plan to treat with above plan.    Patient instructed to call in 72 hours if not feeling better or if symptoms worsen     HPI: 64 y.o.female presenting with symptoms of sinus pain,pressure, nasal congestion, pnd dry cough , ear and throat pain.      SUBJECTIVE    Family History   Problem Relation Age of Onset    Lung cancer Mother     Lung cancer Father     Cancer Father         Urinary Bladder    Thyroid disease Daughter     Heart disease Maternal Grandmother     Heart disease Maternal Grandfather     Heart disease Paternal Grandmother     No Known Problems Paternal Grandfather     No Known Problems Son     Cancer Maternal Aunt         unsure of type    Lung cancer Maternal Aunt     Cancer Paternal Aunt         unsure of type     Social History     Socioeconomic History    Marital status: /Civil Union     Spouse name: Not on file    Number of children: Not on file    Years of education: Not on file    Highest education level: Not on file   Occupational History    Not on file   Tobacco Use    Smoking status: Former     Passive exposure: Past    Smokeless tobacco: Never   Vaping Use    Vaping status: Never Used   Substance and Sexual  Activity    Alcohol use: Yes     Comment: rare    Drug use: Never    Sexual activity: Not on file   Other Topics Concern    Not on file   Social History Narrative    Coffee     Social Drivers of Health     Financial Resource Strain: Low Risk  (6/12/2024)    Received from [facility]    Overall Financial Resource Strain (CARDIA)     Difficulty of Paying Living Expenses: Not hard at all   Food Insecurity: No Food Insecurity (6/12/2024)    Received from [facility]    Hunger Vital Sign     Worried About Running Out of Food in the Last Year: Never true     Ran Out of Food in the Last Year: Never true   Transportation Needs: No Transportation Needs (6/12/2024)    Received from [facility]    PRAPARE - Transportation     Lack of Transportation (Medical): No     Lack of Transportation (Non-Medical): No   Physical Activity: Not on file   Stress: Not on file   Social Connections: Not on file   Intimate Partner Violence: Not At Risk (6/12/2024)    [facility]    Humiliation, Afraid, Rape, and Kick questionnaire     Fear of Current or Ex-Partner: No     Emotionally Abused: No     Physically Abused: No     Sexually Abused: No   Housing Stability: Low Risk  (6/12/2024)    Received from [facility]    Housing Stability Vital Sign     Unable to Pay for Housing in the Last Year: No     Number of Times Moved in the Last Year: 0     Homeless in the Last Year: No     Past Medical History:   Diagnosis Date    Congenital hip dysplasia     Seasonal allergies      Past Surgical History:   Procedure Laterality Date    HIP SURGERY      OVARIAN CYST REMOVAL      TUBAL LIGATION       No Known Allergies    Current Outpatient Medications:     albuterol (2.5 mg/3 mL) 0.083 % nebulizer solution, Take 3 mL (2.5 mg total) by nebulization every 6 (six) hours as needed for wheezing or shortness of breath, Disp: 75 mL, Rfl:  0    albuterol (Ventolin HFA) 90 mcg/act inhaler, Inhale 2 puffs every 4 (four) hours as needed for wheezing, Disp: 18 g, Rfl: 0    cefuroxime (CEFTIN) 500 mg tablet, Take 1 tablet (500 mg total) by mouth every 12 (twelve) hours for 10 days, Disp: 20 tablet, Rfl: 0    cholecalciferol (VITAMIN D3) 1,000 units tablet, Take 1,000 Units by mouth daily, Disp: , Rfl:     Cyanocobalamin (VITAMIN B 12 PO), Take by mouth, Disp: , Rfl:     ELDERBERRY-VITAMIN C-ZINC PO, Take by mouth, Disp: , Rfl:     magnesium gluconate (MAGONATE) 500 mg tablet, Take 500 mg by mouth daily at bedtime, Disp: , Rfl:     multivitamin (THERAGRAN) TABS, Take 1 tablet by mouth daily, Disp: , Rfl:     Omega-3 1000 MG CAPS, Take by mouth, Disp: , Rfl:     Phentermine-Topiramate (Qsymia) 7.5-46 MG CP24, Take 1 capsule by mouth in the morning, Disp: 30 capsule, Rfl: 0    predniSONE 10 mg tablet, 3 tabs po bid x2 days, then 2 tabs po bid x2 days, then 1 tab bid x2 days, then 1 daily until done., Disp: 26 tablet, Rfl: 0    promethazine-dextromethorphan (PHENERGAN-DM) 6.25-15 mg/5 mL oral syrup, Take 5 mL by mouth 4 (four) times a day as needed for cough, Disp: 180 mL, Rfl: 0    rosuvastatin (CRESTOR) 10 MG tablet, TAKE 1 TABLET BY MOUTH DAILY, Disp: 90 tablet, Rfl: 3    TURMERIC PO, Take by mouth, Disp: , Rfl:     azelastine (ASTELIN) 0.1 % nasal spray, 2 sprays into each nostril 2 (two) times a day Use in each nostril as directed (Patient not taking: Reported on 4/8/2025), Disp: 30 mL, Rfl: 3    Review of Systems  Constitutional:     Denies fever, chills, fatigue, weakness ,weight loss, weight gain      ENT: Denies earache, loss of hearing, nosebleed, nasal discharge,but complains of nasal congestion, sore throat,hoarseness and sinus pain and pressure    Pulmonary: Denies shortness of breath ,cough , dyspnea on exertionon, orthopnea ,+ PND + wheezing  Cardiovascular:  Denies bradycardia , tachycardia ,palpations, lower extremity, edema leg,  "claudication  Breast:  Denies new or changing breast lumps,  nipple discharge, nipple changes,  Abdomen:  Denies abdominal pain , anorexia ,indigestion, nausea ,vomiting, constipation , diarrhea  Musculoskeletal: Denies myalgias, arthralgias, joint swelling, joint stiffness ,limb pain, limb swelling  Lymph:+ swollen glands  Gu: no dysuria or urinary frequency  Skin: Denies skin rash, skin lesion, skin wound, itching,dry skin  Neuro: Denies headache, numbness, tingling, confusion, loss of consciousness, dizziness ,vertigo  Psychiatric: Denies feelings of depression, suicidal ideation, anxiety, sleep disturbances    OBJECTIVE  /72   Pulse 92   Temp 97.5 °F (36.4 °C)   Ht 5' 7\" (1.702 m)   Wt 79.5 kg (175 lb 3.2 oz)   SpO2 95%   BMI 27.44 kg/m²   Constitutional:   NAD, well appearing and well nourished      ENT:   Conjunctiva and lids: no injection, edema, or discharge    Pupils and iris: BABAR bilaterally   External inspection of ears and nose: normal without deformities or discharge.      Otoscopic exam: Canals patent ; tm are dull, with with erythem and effusions  ENasal mucosa, septum and turbinates: Turbinae injection with discharge   Oropharynx:  Moist mucosa, normal tongue and tonsils without lesions.Erythema and injection  of post pharynx with pnd      Pulmonary:Respiratory effort normal rate and rhythm, no increased work of breathing. Auscultation of lungs: + few scattered exp wheezes bilaterally       Cardiovascular: regular rate and rhythm, S1 and S2, no murmur, no edema and/or varicosities of LE      Abdomen: Soft and non-distended    Positive bowel sounds    No heptomegaly or splenomegaly    Lymphatic: Anterior  cervical lymphadenopathy         Muscskeletal:  Gait and station: Normal gait     Digits and nails normal without clubbing or cyanosis     Inspection/palpation of joints, bones, and muscles:  No joint tenderness, swelling, full active and passive range of motion      Gu: no suprabubic " tenderness, CVA tenderness or urethral discharge  Skin: Normal skin turgor and no rashes    Neuro:    Normal reflexes   Psych:   alert and oriented to person, place and time  normal mood and affect

## 2025-04-11 RX ORDER — ALBUTEROL SULFATE 0.83 MG/ML
2.5 SOLUTION RESPIRATORY (INHALATION) EVERY 6 HOURS PRN
Qty: 75 ML | Refills: 1 | Status: SHIPPED | OUTPATIENT
Start: 2025-04-11

## 2025-04-29 DIAGNOSIS — R63.8 INCREASED BMI: ICD-10-CM

## 2025-04-29 RX ORDER — PHENTERMINE AND TOPIRAMATE 7.5; 46 MG/1; MG/1
1 CAPSULE, EXTENDED RELEASE ORAL DAILY
Qty: 30 CAPSULE | Refills: 0 | Status: SHIPPED | OUTPATIENT
Start: 2025-04-29 | End: 2025-05-29

## 2025-04-29 NOTE — TELEPHONE ENCOUNTER
1 [id number] 04/04/2025 03/31/2025 Qsymia (Capsule, Extended Release) 30.0 30 7.5 MG-46 MG [facility] Commercial [place] 0 / 0 [place]    1 [id number] 03/03/2025 02/28/2025 Qsymia (Capsule, Extended Release) 30.0 30 7.5 MG-46 MG NA MONTY [facility] Commercial Insurance 0 / 0 [place]    1 [id number] 01/28/2025 01/27/2025 Qsymia (Capsule, Extended Release) 30.0 30 7.5 MG-46 MG NA MONTY [facility] Commercial Insurance 0 / 0 [place]    1 [id number] 12/30/2024 12/27/2024 Qsymia (Capsule, Extended Release) 30.0 30 7.5 MG-46 MG NA TASHA [facility] Commercial Insurance 0 / 0 [place]    1 [id number] 12/13/2024 12/12/2024 Qsymia (Capsule, Extended Release) 30.0 30 3.75 MG-23 MG NA MONTY [facility] Commercial Insurance 0 / 0 [place]

## 2025-06-02 DIAGNOSIS — J31.0 CHRONIC RHINITIS: Primary | ICD-10-CM

## 2025-06-02 DIAGNOSIS — R63.8 INCREASED BMI: ICD-10-CM

## 2025-06-04 RX ORDER — FLUTICASONE PROPIONATE 50 MCG
1 SPRAY, SUSPENSION (ML) NASAL DAILY
Qty: 9.9 ML | Refills: 0 | Status: SHIPPED | OUTPATIENT
Start: 2025-06-04

## 2025-06-04 RX ORDER — PHENTERMINE AND TOPIRAMATE 7.5; 46 MG/1; MG/1
1 CAPSULE, EXTENDED RELEASE ORAL DAILY
Qty: 30 CAPSULE | Refills: 0 | Status: SHIPPED | OUTPATIENT
Start: 2025-06-04 | End: 2025-07-04

## 2025-06-05 ENCOUNTER — TELEPHONE (OUTPATIENT)
Age: 65
End: 2025-06-05

## 2025-06-05 NOTE — TELEPHONE ENCOUNTER
DIMITRIS Morganymia 7.5-46 MG SUBMITTED    to [email or web address]     via    []CMM-KEY:    [x]Surescripts-Case ID # [id number]  []Availity-Auth ID #  NDC #    []Faxed to plan   []Other website    []Phone call Case ID #      []PA sent as URGENT    All office notes, labs and other pertaining documents and studies sent. Clinical questions answered. Awaiting determination from insurance company.     Turnaround time for your insurance to make a decision on your Prior Authorization can take 7-21 business days.

## 2025-06-05 NOTE — TELEPHONE ENCOUNTER
[place] Qsymia 7.5-46 MG APPROVED         Patient advised by          []MyChart Message  []Phone call   []LMOM  []L/M to call office as no active Communication consent on file  []Unable to leave detailed message as VM not approved on Communication consent       Pharmacy advised by    [x]Fax  [x]Phone call  []Secure Chat    Specialty Pharmacy    []

## 2025-07-07 DIAGNOSIS — R63.8 INCREASED BMI: ICD-10-CM

## 2025-07-07 RX ORDER — PHENTERMINE AND TOPIRAMATE EXTENDED-RELEASE 7.5; 46 MG/1; MG/1
1 CAPSULE ORAL DAILY
Qty: 30 CAPSULE | Refills: 0 | Status: SHIPPED | OUTPATIENT
Start: 2025-07-07 | End: 2025-07-16 | Stop reason: SDUPTHER

## 2025-07-07 NOTE — TELEPHONE ENCOUNTER
1 [id number] ** 06/04/2025 06/04/2025 Qsymia (Capsule, Extended Release) 30.0 30 7.5 MG-46 MG NA MARLENY MARCELINO [facility] Commercial Insurance 0 / 0 [place]    1 [id number] ** 05/01/2025 04/29/2025 Qsymia (Capsule, Extended Release) 30.0 30 7.5 MG-46 [facility] Commercial Insurance 0 / 0 [place]    1 [id number] ** 04/04/2025 03/31/2025 Qsymia (Capsule, Extended Release) 30.0 30 7.5 MG-46 MG [facility] Commercial Insurance 0 / 0 [place]    1 [id number] ** 03/03/2025 02/28/2025 Qsymia (Capsule, Extended Release) 30.0 30 7.5 MG-46 [facility] Commercial Insurance 0 / 0 [place]    1 [id number] ** 01/28/2025 01/27/2025 Qsymia (Capsule, Extended Release) 30.0 30 7.5 MG-46 MG [facility] Commercial Insurance 0 / 0 [place]    1 [id number] ** 12/30/2024 12/27/2024 Qsymia (Capsule, Extended Release) 30.0 30 7.5 MG-46 MG NA TASHA [facility] Commercial Insurance 0 / 0 [place]    1 [id number] ** 12/13/2024 12/12/2024 Qsymia (Capsule, Extended Release) 30.0 30 3.75 MG-23 MG NA MONTY [facility] Commercial Insurance 0 / 0 [place]

## 2025-07-28 DIAGNOSIS — J31.0 CHRONIC RHINITIS: ICD-10-CM

## 2025-07-29 RX ORDER — FLUTICASONE PROPIONATE 50 MCG
SPRAY, SUSPENSION (ML) NASAL
Qty: 24 ML | Refills: 1 | Status: SHIPPED | OUTPATIENT
Start: 2025-07-29

## 2025-08-05 PROBLEM — J30.2 SEASONAL ALLERGIC RHINITIS, UNSPECIFIED TRIGGER: Status: ACTIVE | Noted: 2025-08-05

## 2025-08-07 ENCOUNTER — OFFICE VISIT (OUTPATIENT)
Dept: FAMILY MEDICINE CLINIC | Facility: CLINIC | Age: 65
End: 2025-08-07
Payer: COMMERCIAL

## 2025-08-19 ENCOUNTER — APPOINTMENT (OUTPATIENT)
Dept: LAB | Facility: CLINIC | Age: 65
End: 2025-08-19
Payer: COMMERCIAL

## 2025-08-19 DIAGNOSIS — G47.33 OBSTRUCTIVE SLEEP APNEA (ADULT) (PEDIATRIC): ICD-10-CM

## 2025-08-19 DIAGNOSIS — E66.09 OBESITY DUE TO EXCESS CALORIES WITHOUT SERIOUS COMORBIDITY: ICD-10-CM

## 2025-08-19 DIAGNOSIS — Z01.818 PRE-OPERATIVE EXAMINATION: ICD-10-CM

## 2025-08-19 DIAGNOSIS — Z00.00 ANNUAL PHYSICAL EXAM: ICD-10-CM

## 2025-08-19 DIAGNOSIS — E78.9 LIPID DISORDER: ICD-10-CM

## 2025-08-19 PROBLEM — R06.09 DOE (DYSPNEA ON EXERTION): Status: ACTIVE | Noted: 2025-08-19

## 2025-08-19 PROBLEM — I77.810 AORTIC ROOT DILATATION (HCC): Status: ACTIVE | Noted: 2025-08-19

## 2025-08-19 PROBLEM — R73.03 PRE-DIABETES: Status: ACTIVE | Noted: 2025-08-19

## 2025-08-19 LAB
ALBUMIN SERPL BCG-MCNC: 4.2 G/DL (ref 3.5–5)
ALP SERPL-CCNC: 119 U/L (ref 34–104)
ALT SERPL W P-5'-P-CCNC: 17 U/L (ref 7–52)
ANION GAP SERPL CALCULATED.3IONS-SCNC: 8 MMOL/L (ref 4–13)
AST SERPL W P-5'-P-CCNC: 17 U/L (ref 13–39)
BASOPHILS # BLD AUTO: 0.03 THOUSANDS/ÂΜL (ref 0–0.1)
BASOPHILS NFR BLD AUTO: 0 % (ref 0–1)
BILIRUB SERPL-MCNC: 0.69 MG/DL (ref 0.2–1)
BUN SERPL-MCNC: 15 MG/DL (ref 5–25)
CALCIUM SERPL-MCNC: 9.6 MG/DL (ref 8.4–10.2)
CHLORIDE SERPL-SCNC: 105 MMOL/L (ref 96–108)
CHOLEST SERPL-MCNC: 228 MG/DL (ref ?–200)
CO2 SERPL-SCNC: 28 MMOL/L (ref 21–32)
CREAT SERPL-MCNC: 0.9 MG/DL (ref 0.6–1.3)
EOSINOPHIL # BLD AUTO: 0.18 THOUSAND/ÂΜL (ref 0–0.61)
EOSINOPHIL NFR BLD AUTO: 3 % (ref 0–6)
ERYTHROCYTE [DISTWIDTH] IN BLOOD BY AUTOMATED COUNT: 12.6 % (ref 11.6–15.1)
GFR SERPL CREATININE-BSD FRML MDRD: 67 ML/MIN/1.73SQ M
GLUCOSE P FAST SERPL-MCNC: 135 MG/DL (ref 65–99)
HCT VFR BLD AUTO: 47.1 % (ref 34.8–46.1)
HDLC SERPL-MCNC: 53 MG/DL
HGB BLD-MCNC: 15.3 G/DL (ref 11.5–15.4)
IMM GRANULOCYTES # BLD AUTO: 0.04 THOUSAND/UL (ref 0–0.2)
IMM GRANULOCYTES NFR BLD AUTO: 1 % (ref 0–2)
LDLC SERPL CALC-MCNC: 130 MG/DL (ref 0–100)
LDLC SERPL DIRECT ASSAY-MCNC: 131 MG/DL (ref 0–100)
LYMPHOCYTES # BLD AUTO: 2.51 THOUSANDS/ÂΜL (ref 0.6–4.47)
LYMPHOCYTES NFR BLD AUTO: 36 % (ref 14–44)
MCH RBC QN AUTO: 27.4 PG (ref 26.8–34.3)
MCHC RBC AUTO-ENTMCNC: 32.5 G/DL (ref 31.4–37.4)
MCV RBC AUTO: 84 FL (ref 82–98)
MONOCYTES # BLD AUTO: 0.56 THOUSAND/ÂΜL (ref 0.17–1.22)
MONOCYTES NFR BLD AUTO: 8 % (ref 4–12)
NEUTROPHILS # BLD AUTO: 3.57 THOUSANDS/ÂΜL (ref 1.85–7.62)
NEUTS SEG NFR BLD AUTO: 52 % (ref 43–75)
NONHDLC SERPL-MCNC: 175 MG/DL
NRBC BLD AUTO-RTO: 0 /100 WBCS
PLATELET # BLD AUTO: 291 THOUSANDS/UL (ref 149–390)
PMV BLD AUTO: 9.6 FL (ref 8.9–12.7)
POTASSIUM SERPL-SCNC: 4.6 MMOL/L (ref 3.5–5.3)
PROT SERPL-MCNC: 6.8 G/DL (ref 6.4–8.4)
RBC # BLD AUTO: 5.59 MILLION/UL (ref 3.81–5.12)
SODIUM SERPL-SCNC: 141 MMOL/L (ref 135–147)
TRIGL SERPL-MCNC: 225 MG/DL (ref ?–150)
WBC # BLD AUTO: 6.89 THOUSAND/UL (ref 4.31–10.16)

## 2025-08-19 PROCEDURE — 83721 ASSAY OF BLOOD LIPOPROTEIN: CPT

## 2025-08-19 PROCEDURE — 80053 COMPREHEN METABOLIC PANEL: CPT

## 2025-08-19 PROCEDURE — 80061 LIPID PANEL: CPT

## 2025-08-19 PROCEDURE — 85025 COMPLETE CBC W/AUTO DIFF WBC: CPT

## 2025-08-19 PROCEDURE — 36415 COLL VENOUS BLD VENIPUNCTURE: CPT

## 2025-08-20 ENCOUNTER — OFFICE VISIT (OUTPATIENT)
Dept: CARDIOLOGY CLINIC | Facility: CLINIC | Age: 65
End: 2025-08-20
Payer: COMMERCIAL

## 2025-08-20 ENCOUNTER — TELEPHONE (OUTPATIENT)
Dept: CARDIOLOGY CLINIC | Facility: CLINIC | Age: 65
End: 2025-08-20

## 2025-08-20 VITALS
OXYGEN SATURATION: 97 % | HEART RATE: 72 BPM | WEIGHT: 180.6 LBS | DIASTOLIC BLOOD PRESSURE: 68 MMHG | SYSTOLIC BLOOD PRESSURE: 108 MMHG | BODY MASS INDEX: 28.29 KG/M2

## 2025-08-20 DIAGNOSIS — I77.810 AORTIC ROOT DILATATION (HCC): ICD-10-CM

## 2025-08-20 DIAGNOSIS — E78.5 DYSLIPIDEMIA: Primary | ICD-10-CM

## 2025-08-20 DIAGNOSIS — Z82.49 FAMILY HISTORY OF PREMATURE CAD: ICD-10-CM

## 2025-08-20 DIAGNOSIS — R06.09 DOE (DYSPNEA ON EXERTION): Primary | ICD-10-CM

## 2025-08-20 DIAGNOSIS — E78.5 DYSLIPIDEMIA: ICD-10-CM

## 2025-08-20 DIAGNOSIS — R73.03 PRE-DIABETES: ICD-10-CM

## 2025-08-20 DIAGNOSIS — G47.33 OSA (OBSTRUCTIVE SLEEP APNEA): ICD-10-CM

## 2025-08-20 PROCEDURE — 99214 OFFICE O/P EST MOD 30 MIN: CPT | Performed by: NURSE PRACTITIONER

## 2025-08-20 PROCEDURE — 93000 ELECTROCARDIOGRAM COMPLETE: CPT | Performed by: NURSE PRACTITIONER

## 2025-08-20 RX ORDER — ROSUVASTATIN CALCIUM 20 MG/1
20 TABLET, COATED ORAL DAILY
COMMUNITY
End: 2025-08-20 | Stop reason: SDUPTHER

## 2025-08-20 RX ORDER — ROSUVASTATIN CALCIUM 20 MG/1
20 TABLET, COATED ORAL DAILY
Qty: 90 TABLET | Refills: 3 | Status: SHIPPED | OUTPATIENT
Start: 2025-08-20